# Patient Record
Sex: MALE | Race: WHITE | NOT HISPANIC OR LATINO
[De-identification: names, ages, dates, MRNs, and addresses within clinical notes are randomized per-mention and may not be internally consistent; named-entity substitution may affect disease eponyms.]

---

## 2019-12-10 ENCOUNTER — FORM ENCOUNTER (OUTPATIENT)
Age: 84
End: 2019-12-10

## 2019-12-11 ENCOUNTER — APPOINTMENT (OUTPATIENT)
Dept: CT IMAGING | Facility: HOSPITAL | Age: 84
End: 2019-12-11

## 2019-12-11 PROBLEM — Z00.00 ENCOUNTER FOR PREVENTIVE HEALTH EXAMINATION: Status: ACTIVE | Noted: 2019-12-11

## 2019-12-12 ENCOUNTER — FORM ENCOUNTER (OUTPATIENT)
Age: 84
End: 2019-12-12

## 2019-12-13 ENCOUNTER — OUTPATIENT (OUTPATIENT)
Dept: OUTPATIENT SERVICES | Facility: HOSPITAL | Age: 84
LOS: 1 days | End: 2019-12-13
Payer: MEDICARE

## 2019-12-13 ENCOUNTER — APPOINTMENT (OUTPATIENT)
Dept: CT IMAGING | Facility: HOSPITAL | Age: 84
End: 2019-12-13

## 2019-12-13 DIAGNOSIS — J91.0 MALIGNANT PLEURAL EFFUSION: ICD-10-CM

## 2019-12-13 DIAGNOSIS — Z00.00 ENCOUNTER FOR GENERAL ADULT MEDICAL EXAMINATION WITHOUT ABNORMAL FINDINGS: ICD-10-CM

## 2019-12-13 PROCEDURE — 71250 CT THORAX DX C-: CPT | Mod: 26

## 2019-12-13 PROCEDURE — 71250 CT THORAX DX C-: CPT

## 2019-12-17 ENCOUNTER — APPOINTMENT (OUTPATIENT)
Dept: ORTHOPEDIC SURGERY | Facility: CLINIC | Age: 84
End: 2019-12-17
Payer: MEDICARE

## 2019-12-17 PROCEDURE — 99204 OFFICE O/P NEW MOD 45 MIN: CPT

## 2019-12-17 PROCEDURE — 73502 X-RAY EXAM HIP UNI 2-3 VIEWS: CPT | Mod: LT

## 2019-12-17 NOTE — REASON FOR VISIT
[Initial Visit] : an initial visit for [Artificial Hip Joint] : artificial hip joint [Hip Pain] : hip pain [FreeTextEntry3] :  present for today's visit. [TWNoteComboBox1] : American Sign Language

## 2019-12-17 NOTE — HISTORY OF PRESENT ILLNESS
[de-identified] : This is very nice 89-year-old gentleman experiencing left lateral hip pain, which is severe in intensity.  The pain started approximately 3 weeks ago when he fell down some steps.  He was brought to the hospital and transferred to the rehabilitation facility.  It was felt that he may have a small fracture around his left total hip arthroplasty which is several years old.  He has been walking at the rehabilitation facility weightbearing as tolerated.  He comes in for his first evaluation of his left hip today.  Prior to the fall the left total hip arthroplasty was feeling well.  The pain substantially limits activities of daily living. Walking tolerance is reduced.  He is taking narcotics for the pain which is helping.  He is in a wheelchair today and it is unclear if he has a walker at the rehabilitation facility.  He is working with physical therapy there though.  Walking tolerance is reduced.  Activities of daily living are limited because of the pain.

## 2019-12-17 NOTE — PHYSICAL EXAM
[de-identified] : Patient is well nourished, well-developed, in no acute distress, with appropriate mood and affect. The patient is oriented to time, place, and person. Respirations are even and unlabored. Gait evaluation does reveal a limp. There is no inguinal adenopathy. Examination of the contralateral hip shows normal range of motion, strength, no tenderness, and intact skin. The affected limb is well-perfused and showed 2+ dp/pt pulses, without skin lesions, shows a grossly normal motor and sensory examination. Examination of the hip shows a well-healed surgical scar. Hip motion is full and painless from 0-90 degrees extension to flexion, 20 degrees adduction and 20 degrees abduction, and 15 degrees internal and 30 degrees external rotation. Leg lengths are approximately equal. FADIR is negative and TAM is negative. Stinchfield test is negative. Both hips are stable and muscle strength is normal with good strength with resisted abduction and adduction. Pedal pulses are palpable.  Tender to palpation about the lateral aspect of the hip. [de-identified] : AP pelvis, AP hip, and lateral x-rays of the left hip were ordered and obtained in the office and demonstrate satisfactory position and alignment of the components are present. No signs of loosening are seen.  A nondisplaced fracture of the greater trochanter is noted.

## 2019-12-17 NOTE — DISCUSSION/SUMMARY
[de-identified] : This patient has a nondisplaced greater trochanteric fracture of the left hip which is a periprosthetic fracture on his left total of arthroplasty which appears stable.  He can continue to be weightbearing as tolerated with posterior hip precautions.  Recommended to avoid active abduction at this time for a period of 3 months.  P.o. pain control by the rehabilitation facility.  Follow-up in 2 weeks for another x-ray check.  He does understand that if the implant does loosen or move then this may require revision total hip arthroplasty.

## 2019-12-31 ENCOUNTER — APPOINTMENT (OUTPATIENT)
Dept: ORTHOPEDIC SURGERY | Facility: CLINIC | Age: 84
End: 2019-12-31
Payer: SELF-PAY

## 2020-01-06 ENCOUNTER — APPOINTMENT (OUTPATIENT)
Dept: ORTHOPEDIC SURGERY | Facility: CLINIC | Age: 85
End: 2020-01-06
Payer: SELF-PAY

## 2020-01-06 VITALS
HEIGHT: 67 IN | DIASTOLIC BLOOD PRESSURE: 71 MMHG | BODY MASS INDEX: 21.35 KG/M2 | HEART RATE: 81 BPM | WEIGHT: 136 LBS | SYSTOLIC BLOOD PRESSURE: 145 MMHG

## 2020-01-06 PROCEDURE — 99213 OFFICE O/P EST LOW 20 MIN: CPT

## 2020-01-06 PROCEDURE — 73502 X-RAY EXAM HIP UNI 2-3 VIEWS: CPT | Mod: LT

## 2020-01-06 NOTE — HISTORY OF PRESENT ILLNESS
[de-identified] : This is very nice 89-year-old gentleman experiencing left lateral hip pain, which is severe in intensity.  The pain started approximately 5 weeks ago when he fell down some steps.  He was brought to the hospital and transferred to the rehabilitation facility.  I have previously diagnosed him with nondisplaced greater trochanter fracture of the left hip.  He has been walking at the rehabilitation facility weightbearing as tolerated.  We are treating this conservatively.  Prior to the fall the left total hip arthroplasty was feeling well.  The pain substantially limits activities of daily living. Walking tolerance is reduced.  He is taking narcotics for the pain which is helping.  He is in a wheelchair today and it is unclear if he has a walker at the rehabilitation facility.  He is working with physical therapy and employing posterior hip precautions and abductor precautions.  Walking tolerance is reduced.  Activities of daily living are limited because of the pain.

## 2020-01-06 NOTE — PHYSICAL EXAM
[de-identified] : Patient is well nourished, well-developed, in no acute distress, with appropriate mood and affect. The patient is oriented to time, place, and person. Respirations are even and unlabored. Gait evaluation does reveal a limp. There is no inguinal adenopathy. Examination of the contralateral hip shows normal range of motion, strength, no tenderness, and intact skin. The affected limb is well-perfused and showed 2+ dp/pt pulses, without skin lesions, shows a grossly normal motor and sensory examination. Examination of the hip shows a well-healed surgical scar. Hip motion is full and painless from 0-90 degrees extension to flexion, 20 degrees adduction and 20 degrees abduction, and 15 degrees internal and 30 degrees external rotation. Leg lengths are approximately equal. FADIR is negative and TAM is negative. Stinchfield test is negative. Both hips are stable and muscle strength is normal with good strength with resisted abduction and adduction. Pedal pulses are palpable.  Tender to palpation about the lateral aspect of the hip. [de-identified] : AP pelvis, AP hip, and lateral x-rays of the left hip were ordered and obtained in the office and demonstrate satisfactory position and alignment of the components are present. No signs of loosening are seen.  A nondisplaced fracture of the greater trochanter is noted.

## 2020-01-06 NOTE — DISCUSSION/SUMMARY
[de-identified] : This patient has a nondisplaced greater trochanteric fracture of the left hip which is a periprosthetic fracture on his left total of arthroplasty which appears stable.  He can continue to be weightbearing as tolerated with posterior hip precautions.  Recommended to avoid active abduction at this time for a period of 3 months.  P.o. pain control by the rehabilitation facility.  Follow-up in 6 weeks for another x-ray check.  He does understand that if the implant does loosen or move then this may require revision total hip arthroplasty.

## 2020-01-30 ENCOUNTER — INPATIENT (INPATIENT)
Facility: HOSPITAL | Age: 85
LOS: 5 days | Discharge: HOME | DRG: 369 | End: 2020-02-05
Attending: HOSPITALIST | Admitting: STUDENT IN AN ORGANIZED HEALTH CARE EDUCATION/TRAINING PROGRAM
Payer: MEDICARE

## 2020-01-30 VITALS
TEMPERATURE: 98 F | OXYGEN SATURATION: 99 % | SYSTOLIC BLOOD PRESSURE: 106 MMHG | RESPIRATION RATE: 16 BRPM | DIASTOLIC BLOOD PRESSURE: 86 MMHG | HEART RATE: 96 BPM

## 2020-01-30 LAB
ALBUMIN SERPL ELPH-MCNC: 3.4 G/DL — SIGNIFICANT CHANGE UP (ref 3.3–5)
ALP SERPL-CCNC: 68 U/L — SIGNIFICANT CHANGE UP (ref 40–120)
ALT FLD-CCNC: 10 U/L — SIGNIFICANT CHANGE UP (ref 10–45)
ANION GAP SERPL CALC-SCNC: 13 MMOL/L — SIGNIFICANT CHANGE UP (ref 5–17)
AST SERPL-CCNC: 10 U/L — SIGNIFICANT CHANGE UP (ref 10–40)
BASOPHILS # BLD AUTO: 0.01 K/UL — SIGNIFICANT CHANGE UP (ref 0–0.2)
BASOPHILS NFR BLD AUTO: 0.1 % — SIGNIFICANT CHANGE UP (ref 0–2)
BILIRUB SERPL-MCNC: 0.4 MG/DL — SIGNIFICANT CHANGE UP (ref 0.2–1.2)
BLD GP AB SCN SERPL QL: NEGATIVE — SIGNIFICANT CHANGE UP
BUN SERPL-MCNC: 87 MG/DL — HIGH (ref 7–23)
CALCIUM SERPL-MCNC: 8.5 MG/DL — SIGNIFICANT CHANGE UP (ref 8.4–10.5)
CHLORIDE SERPL-SCNC: 108 MMOL/L — SIGNIFICANT CHANGE UP (ref 96–108)
CO2 SERPL-SCNC: 19 MMOL/L — LOW (ref 22–31)
CREAT SERPL-MCNC: 2.59 MG/DL — HIGH (ref 0.5–1.3)
EOSINOPHIL # BLD AUTO: 0.01 K/UL — SIGNIFICANT CHANGE UP (ref 0–0.5)
EOSINOPHIL NFR BLD AUTO: 0.1 % — SIGNIFICANT CHANGE UP (ref 0–6)
GLUCOSE SERPL-MCNC: 139 MG/DL — HIGH (ref 70–99)
HCT VFR BLD CALC: 20.9 % — CRITICAL LOW (ref 39–50)
HGB BLD-MCNC: 6.5 G/DL — CRITICAL LOW (ref 13–17)
IMM GRANULOCYTES NFR BLD AUTO: 0.6 % — SIGNIFICANT CHANGE UP (ref 0–1.5)
LYMPHOCYTES # BLD AUTO: 0.62 K/UL — LOW (ref 1–3.3)
LYMPHOCYTES # BLD AUTO: 6.2 % — LOW (ref 13–44)
MCHC RBC-ENTMCNC: 31.1 GM/DL — LOW (ref 32–36)
MCHC RBC-ENTMCNC: 31.3 PG — SIGNIFICANT CHANGE UP (ref 27–34)
MCV RBC AUTO: 100.5 FL — HIGH (ref 80–100)
MONOCYTES # BLD AUTO: 0.48 K/UL — SIGNIFICANT CHANGE UP (ref 0–0.9)
MONOCYTES NFR BLD AUTO: 4.8 % — SIGNIFICANT CHANGE UP (ref 2–14)
NEUTROPHILS # BLD AUTO: 8.9 K/UL — HIGH (ref 1.8–7.4)
NEUTROPHILS NFR BLD AUTO: 88.2 % — HIGH (ref 43–77)
NRBC # BLD: 0 /100 WBCS — SIGNIFICANT CHANGE UP (ref 0–0)
PLATELET # BLD AUTO: 191 K/UL — SIGNIFICANT CHANGE UP (ref 150–400)
POTASSIUM SERPL-MCNC: 6.1 MMOL/L — HIGH (ref 3.5–5.3)
POTASSIUM SERPL-SCNC: 6.1 MMOL/L — HIGH (ref 3.5–5.3)
PROT SERPL-MCNC: 5.3 G/DL — LOW (ref 6–8.3)
RBC # BLD: 2.08 M/UL — LOW (ref 4.2–5.8)
RBC # FLD: 14.6 % — HIGH (ref 10.3–14.5)
RH IG SCN BLD-IMP: POSITIVE — SIGNIFICANT CHANGE UP
SODIUM SERPL-SCNC: 140 MMOL/L — SIGNIFICANT CHANGE UP (ref 135–145)
TROPONIN T, HIGH SENSITIVITY RESULT: 57 NG/L — HIGH (ref 0–51)
WBC # BLD: 10.08 K/UL — SIGNIFICANT CHANGE UP (ref 3.8–10.5)
WBC # FLD AUTO: 10.08 K/UL — SIGNIFICANT CHANGE UP (ref 3.8–10.5)

## 2020-01-30 PROCEDURE — 99291 CRITICAL CARE FIRST HOUR: CPT

## 2020-01-30 PROCEDURE — 93010 ELECTROCARDIOGRAM REPORT: CPT

## 2020-01-30 RX ORDER — CALCIUM GLUCONATE 100 MG/ML
2 VIAL (ML) INTRAVENOUS ONCE
Refills: 0 | Status: COMPLETED | OUTPATIENT
Start: 2020-01-30 | End: 2020-01-30

## 2020-01-30 RX ORDER — DEXTROSE 50 % IN WATER 50 %
50 SYRINGE (ML) INTRAVENOUS ONCE
Refills: 0 | Status: COMPLETED | OUTPATIENT
Start: 2020-01-30 | End: 2020-01-30

## 2020-01-30 RX ORDER — PANTOPRAZOLE SODIUM 20 MG/1
40 TABLET, DELAYED RELEASE ORAL
Refills: 0 | Status: DISCONTINUED | OUTPATIENT
Start: 2020-01-30 | End: 2020-01-31

## 2020-01-30 RX ORDER — INSULIN HUMAN 100 [IU]/ML
5 INJECTION, SOLUTION SUBCUTANEOUS ONCE
Refills: 0 | Status: COMPLETED | OUTPATIENT
Start: 2020-01-30 | End: 2020-01-30

## 2020-01-30 RX ORDER — ALBUTEROL 90 UG/1
10 AEROSOL, METERED ORAL ONCE
Refills: 0 | Status: COMPLETED | OUTPATIENT
Start: 2020-01-30 | End: 2020-01-30

## 2020-01-30 RX ADMIN — PANTOPRAZOLE SODIUM 40 MILLIGRAM(S): 20 TABLET, DELAYED RELEASE ORAL at 23:04

## 2020-01-30 NOTE — ED PROVIDER NOTE - OBJECTIVE STATEMENT
89y M, accompanied with son, with PMHx of Anemia (last blood transfusion in 2015, likely due to GI bleed), Gout, BPH, HTN, Hypercholesterolemia, CAD S/P triple cardiac bypass in 1995 and pacemaker (on ASA 81mg QD) presents today for 1 episode of melena and vomiting w/ fatigue and weakness today. Pt's son reports that the vomit contained contents of stomach including oatmeal, but the color of the vomit was purplish. Previously visited [facility] a month ago due to fall in late 11/2019. Denies CP, SOB, cough, or sick contact. Last EGD was done in 2015, but pt's son does not recall the results. Drinks red wine socially.     Rx: Currently on Amlodipine, Losartan, Flomax, Simvastatin, ASA, Allopurinol.

## 2020-01-30 NOTE — ED PROVIDER NOTE - PHYSICAL EXAMINATION
Gen: NAD, AOx3, able to make needs known, non-toxic, conversing via sign language  Head: NCAT  HEENT: EOMI, oral mucosa moist  Lung: CTAB, no respiratory distress, no wheezes/rhonchi/rales B/L  CV: RRR, no murmurs  Abd: soft, NTND, no guarding  MSK: no visible deformities  Neuro: No focal sensory or motor deficits  Skin: Warm, well perfused  Psych: normal affect

## 2020-01-30 NOTE — ED PROVIDER NOTE - CLINICAL SUMMARY MEDICAL DECISION MAKING FREE TEXT BOX
Janay Ford): 89y elderly M with questionable BRBPR vs. melena. Pt has general fatigue, will likely admit for further evaluation of sx. Janay Ford): 89y elderly M with questionable BRBPR vs. melena. Pt has general fatigue, will likely admit for further evaluation of symptoms. Pt hgb <7, consented for blood, to transfuse and admit given pts symptoms and concern for GI bleed given report of melontic stools x1 day w/ bright red blood seen w/ most recent bowel movement w/ clots.

## 2020-01-30 NOTE — ED PROVIDER NOTE - PMH
Anemia    BPH (benign prostatic hyperplasia)    CAD (coronary artery disease)    Gout    Hypercholesterolemia    Hypertension

## 2020-01-30 NOTE — ED PROVIDER NOTE - PROGRESS NOTE DETAILS
Dr. Germain Velazquez, PGY-2: page out to PCP Dr. Blanco Dr. Germain Velazquez, PGY-2: 2nd page to Dr. Blanco

## 2020-01-31 DIAGNOSIS — K92.2 GASTROINTESTINAL HEMORRHAGE, UNSPECIFIED: ICD-10-CM

## 2020-01-31 DIAGNOSIS — Z29.9 ENCOUNTER FOR PROPHYLACTIC MEASURES, UNSPECIFIED: ICD-10-CM

## 2020-01-31 DIAGNOSIS — Z98.890 OTHER SPECIFIED POSTPROCEDURAL STATES: Chronic | ICD-10-CM

## 2020-01-31 DIAGNOSIS — D62 ACUTE POSTHEMORRHAGIC ANEMIA: ICD-10-CM

## 2020-01-31 DIAGNOSIS — N18.9 CHRONIC KIDNEY DISEASE, UNSPECIFIED: ICD-10-CM

## 2020-01-31 DIAGNOSIS — N17.9 ACUTE KIDNEY FAILURE, UNSPECIFIED: ICD-10-CM

## 2020-01-31 DIAGNOSIS — E87.5 HYPERKALEMIA: ICD-10-CM

## 2020-01-31 DIAGNOSIS — I24.8 OTHER FORMS OF ACUTE ISCHEMIC HEART DISEASE: ICD-10-CM

## 2020-01-31 DIAGNOSIS — Z95.0 PRESENCE OF CARDIAC PACEMAKER: Chronic | ICD-10-CM

## 2020-01-31 DIAGNOSIS — Z79.899 OTHER LONG TERM (CURRENT) DRUG THERAPY: ICD-10-CM

## 2020-01-31 DIAGNOSIS — Z02.9 ENCOUNTER FOR ADMINISTRATIVE EXAMINATIONS, UNSPECIFIED: ICD-10-CM

## 2020-01-31 DIAGNOSIS — H91.90 UNSPECIFIED HEARING LOSS, UNSPECIFIED EAR: ICD-10-CM

## 2020-01-31 DIAGNOSIS — I25.10 ATHEROSCLEROTIC HEART DISEASE OF NATIVE CORONARY ARTERY WITHOUT ANGINA PECTORIS: ICD-10-CM

## 2020-01-31 LAB
ANION GAP SERPL CALC-SCNC: 11 MMOL/L — SIGNIFICANT CHANGE UP (ref 5–17)
ANION GAP SERPL CALC-SCNC: 15 MMOL/L — SIGNIFICANT CHANGE UP (ref 5–17)
BASOPHILS # BLD AUTO: 0.01 K/UL — SIGNIFICANT CHANGE UP (ref 0–0.2)
BASOPHILS NFR BLD AUTO: 0.1 % — SIGNIFICANT CHANGE UP (ref 0–2)
BUN SERPL-MCNC: 87 MG/DL — HIGH (ref 7–23)
BUN SERPL-MCNC: 91 MG/DL — HIGH (ref 7–23)
CALCIUM SERPL-MCNC: 8.6 MG/DL — SIGNIFICANT CHANGE UP (ref 8.4–10.5)
CALCIUM SERPL-MCNC: 9 MG/DL — SIGNIFICANT CHANGE UP (ref 8.4–10.5)
CHLORIDE SERPL-SCNC: 106 MMOL/L — SIGNIFICANT CHANGE UP (ref 96–108)
CHLORIDE SERPL-SCNC: 110 MMOL/L — HIGH (ref 96–108)
CO2 SERPL-SCNC: 17 MMOL/L — LOW (ref 22–31)
CO2 SERPL-SCNC: 21 MMOL/L — LOW (ref 22–31)
CREAT SERPL-MCNC: 2.5 MG/DL — HIGH (ref 0.5–1.3)
CREAT SERPL-MCNC: 2.68 MG/DL — HIGH (ref 0.5–1.3)
EOSINOPHIL # BLD AUTO: 0.01 K/UL — SIGNIFICANT CHANGE UP (ref 0–0.5)
EOSINOPHIL NFR BLD AUTO: 0.1 % — SIGNIFICANT CHANGE UP (ref 0–6)
GLUCOSE BLDC GLUCOMTR-MCNC: 101 MG/DL — HIGH (ref 70–99)
GLUCOSE BLDC GLUCOMTR-MCNC: 106 MG/DL — HIGH (ref 70–99)
GLUCOSE SERPL-MCNC: 113 MG/DL — HIGH (ref 70–99)
GLUCOSE SERPL-MCNC: 96 MG/DL — SIGNIFICANT CHANGE UP (ref 70–99)
HCT VFR BLD CALC: 20.4 % — CRITICAL LOW (ref 39–50)
HCT VFR BLD CALC: 21 % — CRITICAL LOW (ref 39–50)
HCT VFR BLD CALC: 23 % — LOW (ref 39–50)
HCT VFR BLD CALC: 23.4 % — LOW (ref 39–50)
HGB BLD-MCNC: 6.3 G/DL — CRITICAL LOW (ref 13–17)
HGB BLD-MCNC: 6.5 G/DL — CRITICAL LOW (ref 13–17)
HGB BLD-MCNC: 7.3 G/DL — LOW (ref 13–17)
HGB BLD-MCNC: 7.4 G/DL — LOW (ref 13–17)
IMM GRANULOCYTES NFR BLD AUTO: 0.7 % — SIGNIFICANT CHANGE UP (ref 0–1.5)
LYMPHOCYTES # BLD AUTO: 1.01 K/UL — SIGNIFICANT CHANGE UP (ref 1–3.3)
LYMPHOCYTES # BLD AUTO: 11.3 % — LOW (ref 13–44)
MAGNESIUM SERPL-MCNC: 1.6 MG/DL — SIGNIFICANT CHANGE UP (ref 1.6–2.6)
MAGNESIUM SERPL-MCNC: 1.7 MG/DL — SIGNIFICANT CHANGE UP (ref 1.6–2.6)
MCHC RBC-ENTMCNC: 30.4 PG — SIGNIFICANT CHANGE UP (ref 27–34)
MCHC RBC-ENTMCNC: 30.7 PG — SIGNIFICANT CHANGE UP (ref 27–34)
MCHC RBC-ENTMCNC: 30.9 GM/DL — LOW (ref 32–36)
MCHC RBC-ENTMCNC: 31 GM/DL — LOW (ref 32–36)
MCHC RBC-ENTMCNC: 31.1 PG — SIGNIFICANT CHANGE UP (ref 27–34)
MCHC RBC-ENTMCNC: 31.2 PG — SIGNIFICANT CHANGE UP (ref 27–34)
MCHC RBC-ENTMCNC: 31.6 GM/DL — LOW (ref 32–36)
MCHC RBC-ENTMCNC: 31.7 GM/DL — LOW (ref 32–36)
MCV RBC AUTO: 97.9 FL — SIGNIFICANT CHANGE UP (ref 80–100)
MCV RBC AUTO: 98.6 FL — SIGNIFICANT CHANGE UP (ref 80–100)
MCV RBC AUTO: 98.7 FL — SIGNIFICANT CHANGE UP (ref 80–100)
MCV RBC AUTO: 99.1 FL — SIGNIFICANT CHANGE UP (ref 80–100)
MONOCYTES # BLD AUTO: 0.61 K/UL — SIGNIFICANT CHANGE UP (ref 0–0.9)
MONOCYTES NFR BLD AUTO: 6.9 % — SIGNIFICANT CHANGE UP (ref 2–14)
NEUTROPHILS # BLD AUTO: 7.2 K/UL — SIGNIFICANT CHANGE UP (ref 1.8–7.4)
NEUTROPHILS NFR BLD AUTO: 80.9 % — HIGH (ref 43–77)
NRBC # BLD: 0 /100 WBCS — SIGNIFICANT CHANGE UP (ref 0–0)
PHOSPHATE SERPL-MCNC: 4.1 MG/DL — SIGNIFICANT CHANGE UP (ref 2.5–4.5)
PLATELET # BLD AUTO: 143 K/UL — LOW (ref 150–400)
PLATELET # BLD AUTO: 154 K/UL — SIGNIFICANT CHANGE UP (ref 150–400)
PLATELET # BLD AUTO: 157 K/UL — SIGNIFICANT CHANGE UP (ref 150–400)
PLATELET # BLD AUTO: 158 K/UL — SIGNIFICANT CHANGE UP (ref 150–400)
POTASSIUM SERPL-MCNC: 4.8 MMOL/L — SIGNIFICANT CHANGE UP (ref 3.5–5.3)
POTASSIUM SERPL-MCNC: 4.9 MMOL/L — SIGNIFICANT CHANGE UP (ref 3.5–5.3)
POTASSIUM SERPL-SCNC: 4.8 MMOL/L — SIGNIFICANT CHANGE UP (ref 3.5–5.3)
POTASSIUM SERPL-SCNC: 4.9 MMOL/L — SIGNIFICANT CHANGE UP (ref 3.5–5.3)
RBC # BLD: 2.07 M/UL — LOW (ref 4.2–5.8)
RBC # BLD: 2.12 M/UL — LOW (ref 4.2–5.8)
RBC # BLD: 2.35 M/UL — LOW (ref 4.2–5.8)
RBC # BLD: 2.37 M/UL — LOW (ref 4.2–5.8)
RBC # FLD: 14.3 % — SIGNIFICANT CHANGE UP (ref 10.3–14.5)
RBC # FLD: 14.4 % — SIGNIFICANT CHANGE UP (ref 10.3–14.5)
RBC # FLD: 14.5 % — SIGNIFICANT CHANGE UP (ref 10.3–14.5)
RBC # FLD: 14.5 % — SIGNIFICANT CHANGE UP (ref 10.3–14.5)
RH IG SCN BLD-IMP: POSITIVE — SIGNIFICANT CHANGE UP
SODIUM SERPL-SCNC: 138 MMOL/L — SIGNIFICANT CHANGE UP (ref 135–145)
SODIUM SERPL-SCNC: 142 MMOL/L — SIGNIFICANT CHANGE UP (ref 135–145)
TROPONIN T, HIGH SENSITIVITY RESULT: 75 NG/L — HIGH (ref 0–51)
WBC # BLD: 7.83 K/UL — SIGNIFICANT CHANGE UP (ref 3.8–10.5)
WBC # BLD: 8.15 K/UL — SIGNIFICANT CHANGE UP (ref 3.8–10.5)
WBC # BLD: 8.61 K/UL — SIGNIFICANT CHANGE UP (ref 3.8–10.5)
WBC # BLD: 8.9 K/UL — SIGNIFICANT CHANGE UP (ref 3.8–10.5)
WBC # FLD AUTO: 7.83 K/UL — SIGNIFICANT CHANGE UP (ref 3.8–10.5)
WBC # FLD AUTO: 8.15 K/UL — SIGNIFICANT CHANGE UP (ref 3.8–10.5)
WBC # FLD AUTO: 8.61 K/UL — SIGNIFICANT CHANGE UP (ref 3.8–10.5)
WBC # FLD AUTO: 8.9 K/UL — SIGNIFICANT CHANGE UP (ref 3.8–10.5)

## 2020-01-31 PROCEDURE — 99223 1ST HOSP IP/OBS HIGH 75: CPT | Mod: GC,25

## 2020-01-31 PROCEDURE — 99223 1ST HOSP IP/OBS HIGH 75: CPT

## 2020-01-31 PROCEDURE — 93286 PERI-PX EVAL PM/LDLS PM IP: CPT | Mod: 26

## 2020-01-31 PROCEDURE — 43255 EGD CONTROL BLEEDING ANY: CPT | Mod: GC

## 2020-01-31 RX ORDER — TAMSULOSIN HYDROCHLORIDE 0.4 MG/1
0.4 CAPSULE ORAL AT BEDTIME
Refills: 0 | Status: DISCONTINUED | OUTPATIENT
Start: 2020-01-31 | End: 2020-02-05

## 2020-01-31 RX ORDER — PANTOPRAZOLE SODIUM 20 MG/1
80 TABLET, DELAYED RELEASE ORAL ONCE
Refills: 0 | Status: COMPLETED | OUTPATIENT
Start: 2020-01-31 | End: 2020-01-31

## 2020-01-31 RX ORDER — ALLOPURINOL 300 MG
0 TABLET ORAL
Qty: 0 | Refills: 0 | DISCHARGE

## 2020-01-31 RX ORDER — PANTOPRAZOLE SODIUM 20 MG/1
40 TABLET, DELAYED RELEASE ORAL
Refills: 0 | Status: DISCONTINUED | OUTPATIENT
Start: 2020-01-31 | End: 2020-02-04

## 2020-01-31 RX ORDER — SODIUM ZIRCONIUM CYCLOSILICATE 10 G/10G
10 POWDER, FOR SUSPENSION ORAL ONCE
Refills: 0 | Status: COMPLETED | OUTPATIENT
Start: 2020-01-31 | End: 2020-01-31

## 2020-01-31 RX ORDER — FAMOTIDINE 10 MG/ML
80 INJECTION INTRAVENOUS ONCE
Refills: 0 | Status: DISCONTINUED | OUTPATIENT
Start: 2020-01-31 | End: 2020-01-31

## 2020-01-31 RX ADMIN — ALBUTEROL 10 MILLIGRAM(S): 90 AEROSOL, METERED ORAL at 00:05

## 2020-01-31 RX ADMIN — Medication 200 GRAM(S): at 00:26

## 2020-01-31 RX ADMIN — PANTOPRAZOLE SODIUM 80 MILLIGRAM(S): 20 TABLET, DELAYED RELEASE ORAL at 04:09

## 2020-01-31 RX ADMIN — TAMSULOSIN HYDROCHLORIDE 0.4 MILLIGRAM(S): 0.4 CAPSULE ORAL at 21:54

## 2020-01-31 RX ADMIN — PANTOPRAZOLE SODIUM 40 MILLIGRAM(S): 20 TABLET, DELAYED RELEASE ORAL at 18:47

## 2020-01-31 RX ADMIN — SODIUM ZIRCONIUM CYCLOSILICATE 10 GRAM(S): 10 POWDER, FOR SUSPENSION ORAL at 01:58

## 2020-01-31 RX ADMIN — INSULIN HUMAN 5 UNIT(S): 100 INJECTION, SOLUTION SUBCUTANEOUS at 00:03

## 2020-01-31 RX ADMIN — Medication 50 MILLILITER(S): at 00:02

## 2020-01-31 NOTE — ED ADULT NURSE NOTE - ED STAT RN HANDOFF DETAILS
Patient admitted to telemetry endorsed to hold SHIRLEY Lozano with 2 unit of PRBC in progress no reaction noted. Patient stable in no acute distress.

## 2020-01-31 NOTE — ED ADULT NURSE REASSESSMENT NOTE - NS ED NURSE REASSESS COMMENT FT1
Received patient from night RN s/p transfusion in no acute distress, awaiting  for patient. No s/s of pain or distress continue to monitor patient.

## 2020-01-31 NOTE — ED ADULT NURSE REASSESSMENT NOTE - NS ED NURSE REASSESS COMMENT FT1
Patient receiving second unit of PRBC  service #[id number] used to explain to patient process and to make RN aware if any SOB or pain. Patient verbalizes understanding V/S WNL continue to monitor patient.

## 2020-01-31 NOTE — H&P ADULT - PROBLEM SELECTOR PLAN 10
Transitions of Care Status:  1.  Name of PCP:  Arlette Keith  2.  PCP Contacted on Admission: [ ] Y    [ x] N  -- no answer.   3.  PCP contacted at Discharge: [ ] Y    [ ] N    [ ] N/A  4.  Post-Discharge Appointment Date and Location:  5.  Summary of Handoff given to PCP:

## 2020-01-31 NOTE — H&P ADULT - NSHPLABSRESULTS_GEN_ALL_CORE
LABS:                        6.5    8.61  )-----------( 157      ( 31 Jan 2020 07:25 )             21.0     01-31    138  |  106  |  87<H>  ----------------------------<  113<H>  4.8   |  17<L>  |  2.50<H>    Ca    8.6      31 Jan 2020 05:15    TPro  5.3<L>  /  Alb  3.4  /  TBili  0.4  /  DBili  x   /  AST  10  /  ALT  10  /  AlkPhos  68  01-30      ADDITIONAL STUDIES PERSONALLY REVIEWED BY ME:  GETACHEWG: ADELA carpenter.    CONSULTANTS:  [facility] LABS:                        6.5    8.61  )-----------( 157      ( 31 Jan 2020 07:25 )             21.0     01-31    138  |  106  |  87<H>  ----------------------------<  113<H>  4.8   |  17<L>  |  2.50<H>    Ca    8.6      31 Jan 2020 05:15    TPro  5.3<L>  /  Alb  3.4  /  TBili  0.4  /  DBili  x   /  AST  10  /  ALT  10  /  AlkPhos  68  01-30      ADDITIONAL STUDIES PERSONALLY REVIEWED BY ME:  EKG: ADELA carpenter.    CONSULTANTS:  GI notified.

## 2020-01-31 NOTE — CONSULT NOTE ADULT - ASSESSMENT
Impression:    #Acute blood loss anemia from upper GI bleed: Admission Hb 6.5 with inappropriate response to 7.5 after 2 units pRBC and ongoing melena. Remains hemodynamically stable. On pantoprazole infusion. Suspect bleeding from gastric or duodenal PUD, enhanced by lowe dose aspirin, given prior history of upper GI bleed from suspected ulcer. Differential also includes small bowel angioectasias vs. erosive gastritis. Bleeding from varices or Dieulafoy's lesion less likely.   #CAD s/p CABG: Holding aspirin   #Stage IV CKD     Recommendations:  - keep NPO  - continue pantoprazole infusion at 8 mg/hr  - plan for upper endoscopy today Impression:    #Acute blood loss anemia from upper GI bleed: Admission Hb 6.5 with inappropriate response to 7.5 after 2 units pRBC and ongoing melena. Remains hemodynamically stable. On pantoprazole infusion. Suspect bleeding from gastric or duodenal PUD, enhanced by lowe dose aspirin, given prior history of upper GI bleed from suspected ulcer. Differential also includes small bowel angioectasias vs. erosive gastritis. Bleeding from varices or Dieulafoy's lesion less likely.   #CAD s/p CABG: Holding aspirin   #Stage IV CKD     Recommendations:  - keep NPO  - continue pantoprazole infusion at 8 mg/hr  - plan for upper endoscopy today      Crissy Day PGY-4  Gastroenterology Fellow  Pager #[phone] or [phone]  Pager #[phone] 5pm-7am on weekdays, and on weekends Impression:    #Acute blood loss anemia from upper GI bleed: Admission Hb 6.5 with inappropriate response to 7.5 after 2 units pRBC and ongoing melena. Remains hemodynamically stable. On pantoprazole infusion. Suspect bleeding from gastric or duodenal PUD, enhanced by lowe dose aspirin, given prior history of upper GI bleed from suspected ulcer. Differential also includes small bowel angioectasias vs. erosive gastritis. Bleeding from varices or Dieulafoy's lesion less likely.   #CAD s/p CABG: Holding aspirin.    #Stage IV CKD     Recommendations:  - keep NPO  - continue pantoprazole infusion at 8 mg/hr  - monitor bowel movements and trend CBC every 8 hours  - maintain 2 peripheral IV lines   - transfuse to maintain Hb > 8.0 given history of CAD with CABG  - plan for upper endoscopy today providing recent interrogation report of PPM can be obtained from outpatient Cardiologist; if not then will request PPM interrogation by [facility] team      Crissy Day PGY-4  Gastroenterology Fellow  Pager #[phone] or [phone]  Pager #[phone] 5pm-7am on weekdays, and on weekends Impression:    #Acute blood loss anemia from upper GI bleed: Admission Hb 6.5 with inappropriate response to 7.5 after 2 units pRBC and ongoing melena. Remains hemodynamically stable. On pantoprazole infusion. Suspect bleeding from gastric or duodenal PUD, enhanced by lowe dose aspirin, given prior history of upper GI bleed from suspected ulcer. Differential also includes small bowel angioectasias vs. erosive gastritis. Bleeding from varices or Dieulafoy's lesion less likely.   #CAD s/p CABG: Holding aspirin.    #Stage IV CKD     Recommendations:  - keep NPO  - continue pantoprazole infusion at 8 mg/hr  - monitor bowel movements and trend CBC every 8 hours  - maintain 2 peripheral IV lines   - transfuse to maintain Hb > 8.0 given history of CAD with CABG  - plan for upper endoscopy today providing PPM interrogation can per performed by [facility] team (office of outpatient Cardiologist Dr. Mark closed today so prior interrogation reports could not be obtained)      Crissy Day PGY-4  Gastroenterology Fellow  Pager #[phone] or [phone]  [facility] Phone #[phone]

## 2020-01-31 NOTE — H&P ADULT - NSHPREVIEWOFSYSTEMS_GEN_ALL_CORE
REVIEW OF SYSTEMS  CONSTITUTIONAL: No fevers. No chills  EYES/ENT: No visual changes. No discharge from eyes. No vertigo. No throat pain. No dysphagia.  NECK: No pain or stiffness or rigidity.  RESPIRATORY: No cough. No wheezing. No hemoptysis. No shortness of breath.  CARDIOVASCULAR: No chest pain. No palpitations.   GASTROINTESTINAL: No abdominal pain. No nausea. No vomiting. No hematemesis. No diarrhea. No constipation. +melena, No hematochezia.  GENITOURINARY: No dysuria. No hesitancy. No frequency. No hematuria.  NEUROLOGICAL: No numbness. No weakness. No change in speech. No fecal or urinary incontinence.   MSK: No joint swelling or erythema. No back pain.  SKIN: No itching or rashes.   PSYCH: Normal affect. Normal mood. No si. No hi.    Review of systems negative except for items noted above.

## 2020-01-31 NOTE — CONSULT NOTE ADULT - ATTENDING COMMENTS
Patient seen and examined. Agree with above. Plan for EGD today for evaluation of upper GI bleeding. Keep NPO, continue protonix infusion, follow-up serial CBC and transfuse as needed.

## 2020-01-31 NOTE — H&P ADULT - ASSESSMENT
89 y/o M PMHx of CABG, CKD stage IV, PPM placement, and GIB (last EGD in 2015) who presents with acute blood loss anemia, hyperkalemia, and LUCY 89 y/o M PMHx of CABG, CKD stage IV, PPM placement, and GIB (last EGD in 2015) who presents with acute blood loss anemia, hyperkalemia, and LUCY.

## 2020-01-31 NOTE — PROCEDURE NOTE - ADDITIONAL PROCEDURE DETAILS
1. Normal PPM function with pacing/sensing thresholds and impedances WNL  2. Battery longevity is 15months  3. Patient is not pacemaker dependent

## 2020-01-31 NOTE — H&P ADULT - PROBLEM SELECTOR PLAN 4
Stage IV  Baseline Cr around 2. Stage IV  Baseline Cr around 2.  Hold arb given osmany. From anemia and decreased renal clearance  Monitor troponin to peak.  No active anginal equivalents noted by patient.

## 2020-01-31 NOTE — CONSULT NOTE ADULT - SUBJECTIVE AND OBJECTIVE BOX
Chief Complaint:  Patient is a 89y old  Male who presents with a chief complaint of Acute blood loss anemia (31 Jan 2020 10:53)      HPI:    Obtain with  Marta.        89 year old man with history of CAD s/p CABG (1995) on aspirin, CKD stage IV, s/p PPM placement, and upper GI bleed (approx. 4-5 years ago) who presents for melena and  coffee-ground emesis. Patient reports bowel movement with bloody, loose stool yesterday. No associated abdominal pain, dizziness, chest pain or SOB. Of note, patient admits to poor memory and is not a good historian. Collateral history obtained from son, Gordon Murrieta, over the phone. He did not witness patient having melena but reports patient appeared lethargic and "out of it" when he arrived home. Patient subsequently ate oatmeal and immediately vomited which was noted to be maroon color. Son reports prior episode of GI bleeding approx. 4-5 years ago, requiring multiple blood transfusions and upper endoscopy at [facility]. He reports possible ulcer as cause of bleeding. Patient is not on anticoagulants but takes low-dose aspirin daily. No NSAID overuse but son admits to giving patient Aleve x1 yesterday. Of note, patient recently completed rehab at [facility] for hip injury, now living with son at home, ambulating with walker on wheels, but lived independent as recent as one month ago.        Allergies:  No Known Allergies      Home Medications:    · 	aspirin 81 mg oral tablet, chewable: Last Dose Taken:  , 1 tab(s) orally once a day  · 	losartan: Last Dose Taken:  , dose(s) orally once a day  · 	Flomax 0.4 mg oral capsule: Last Dose Taken:  , 1 cap(s) orally once a day  · 	allopurinol 100 mg oral tablet: Last Dose Taken:  , dose(s) orally once a day  · 	Norvasc 5 mg oral tablet: 1 tab(s) orally once a day  · 	simvastatin 5 mg oral tablet: dose(s) orally once a day      Hospital Medications:  pantoprazole  Injectable 40 milliGRAM(s) IV Push two times a day  tamsulosin 0.4 milliGRAM(s) Oral at bedtime      PMHX/PSHX:  CKD (chronic kidney disease)  S/P CABG x 3  History of esophagogastroduodenoscopy (EGD)  Pacemaker      Family history:  No pertinent family history in first degree relatives      Social History:     ROS:     General:  No weight loss, fevers, chills, night sweats, fatigue  Eyes:  No vision changes, no yellowing of eyes   ENT:  No throat pain, runny nose  CV:  No chest pain, palpitations  Resp:  No SOB, cough, wheezing  GI:  See HPI  :  No burning with urination, no hematuria   Muscle:  No muscle pain, weakness  Neuro:  No numbness/tingling, memory problems  Psych:  No fatigue, insomnia, mood problems  Heme:  No easy bruisability  Skin:  No rash, itching       PHYSICAL EXAM:     GENERAL:  Appears stated age, well-groomed, well-nourished, no distress  HEENT:  NC/AT,  conjunctivae clear and pink,  no JVD  CHEST:  Full & symmetric excursion, no increased effort, breath sounds clear  HEART:  Regular rhythm, S1, S2, no murmur/rub/S3/S4, no abdominal bruit, no edema  ABDOMEN:  Soft, non-tender, non-distended, normoactive bowel sounds,  no masses ,  EXTREMITIES:  no cyanosis,clubbing or edema  SKIN:  No rash/erythema/ecchymoses/petechiae/wounds/abscess/warm/dry  NEURO:  Alert, oriented      Vital Signs:  Vital Signs Last 24 Hrs  T(C): 36.7 (31 Jan 2020 11:35), Max: 36.8 (30 Jan 2020 21:17)  T(F): 98 (31 Jan 2020 11:35), Max: 98.2 (30 Jan 2020 21:17)  HR: 86 (31 Jan 2020 11:35) (71 - 105)  BP: 124/58 (31 Jan 2020 11:35) (99/54 - 132/58)  BP(mean): --  RR: 17 (31 Jan 2020 11:35) (16 - 19)  SpO2: 99% (31 Jan 2020 11:35) (96% - 100%)  Daily     Daily     LABS:                        7.4    8.15  )-----------( 143      ( 31 Jan 2020 13:41 )             23.4     01-31    138  |  106  |  87<H>  ----------------------------<  113<H>  4.8   |  17<L>  |  2.50<H>    Ca    8.6      31 Jan 2020 05:15  Mg     1.6     01-31    TPro  5.3<L>  /  Alb  3.4  /  TBili  0.4  /  DBili  x   /  AST  10  /  ALT  10  /  AlkPhos  68  01-30    LIVER FUNCTIONS - ( 30 Jan 2020 22:22 )  Alb: 3.4 g/dL / Pro: 5.3 g/dL / ALK PHOS: 68 U/L / ALT: 10 U/L / AST: 10 U/L / GGT: x             Imaging: Chief Complaint:  Patient is a 89y old  Male who presents with a chief complaint of Acute blood loss anemia (31 Jan 2020 10:53)      HPI:    Obtain with  Marta.       89 year old man with history of CAD s/p CABG (1995) on aspirin, CKD stage IV, s/p PPM placement, and upper GI bleed (approx. 4-5 years ago) who presents for melena and  coffee-ground emesis. Patient reports bowel movement with bloody, loose stool yesterday. No associated abdominal pain, dizziness, chest pain or SOB. He denies vomiting, BRBPR or melena previously. He reports having "dark red" bowel movements since his hospitalization. Of note, patient admits to poor memory and is not a good historian.     Collateral history obtained from son, Gordon Murrieta, over the phone. He did not witness patient having melena but reports patient appeared lethargic and "out of it" when he arrived home. Patient subsequently ate oatmeal and immediately vomited which was noted to be maroon color. Son reports prior episode of GI bleeding approx. 4-5 years ago, requiring multiple blood transfusions and upper endoscopy at [facility]. He reports possible ulcer as cause of bleeding. Patient is not on anticoagulants but takes low-dose aspirin daily. No NSAID overuse but son admits to giving patient Aleve x1 yesterday. Of note, patient recently completed rehab at [facility] for hip injury, now living with son at home, ambulating with walker on wheels, but lived independently as recent as one month ago.        Allergies:  No Known Allergies      Home Medications:    · 	aspirin 81 mg oral tablet, chewable: Last Dose Taken:  , 1 tab(s) orally once a day  · 	losartan: Last Dose Taken:  , dose(s) orally once a day  · 	Flomax 0.4 mg oral capsule: Last Dose Taken:  , 1 cap(s) orally once a day  · 	allopurinol 100 mg oral tablet: Last Dose Taken:  , dose(s) orally once a day  · 	Norvasc 5 mg oral tablet: 1 tab(s) orally once a day  · 	simvastatin 5 mg oral tablet: dose(s) orally once a day      Hospital Medications:  pantoprazole  Injectable 40 milliGRAM(s) IV Push two times a day  tamsulosin 0.4 milliGRAM(s) Oral at bedtime      PMHX/PSHX:  CKD (chronic kidney disease)  S/P CABG x 3  History of esophagogastroduodenoscopy (EGD)  Pacemaker      Family history:  No pertinent family history in first degree relatives      Social History: Resides with son, largely independent with ADLs.     ROS:     General:  No weight loss, fevers, chills, night sweats, fatigue  Eyes:  No vision changes, no yellowing of eyes   ENT:  No throat pain, runny nose  CV:  No chest pain, palpitations  Resp:  No SOB, cough, wheezing  GI:  See HPI  :  No burning with urination, no hematuria   Muscle:  No muscle pain, weakness  Neuro:  No numbness/tingling, memory problems  Psych:  No fatigue, insomnia, mood problems  Heme:  No easy bruisability  Skin:  No rash, itching       PHYSICAL EXAM:     GENERAL: Elderly, sleeping but arousable, no distress  HEENT:  Conjunctivae clear and pink, no scleral icterus, deafness   CHEST:  Full & symmetric excursion, no increased effort  HEART:  Regular rhythm & rate  ABDOMEN:  Soft, non-distended +LLQ tenderness   EXTREMITIES:  no LE edema  SKIN:  No rash/erythema/ecchymoses/petechiae/jaundice   NEURO:  Alert, orientedx2 (place, person, less with time)      Vital Signs:  Vital Signs Last 24 Hrs  T(C): 36.7 (31 Jan 2020 11:35), Max: 36.8 (30 Jan 2020 21:17)  T(F): 98 (31 Jan 2020 11:35), Max: 98.2 (30 Jan 2020 21:17)  HR: 86 (31 Jan 2020 11:35) (71 - 105)  BP: 124/58 (31 Jan 2020 11:35) (99/54 - 132/58)  BP(mean): --  RR: 17 (31 Jan 2020 11:35) (16 - 19)  SpO2: 99% (31 Jan 2020 11:35) (96% - 100%)  Daily       LABS:                        7.4    8.15  )-----------( 143      ( 31 Jan 2020 13:41 )             23.4     01-31    138  |  106  |  87<H>  ----------------------------<  113<H>  4.8   |  17<L>  |  2.50<H>    Ca    8.6      31 Jan 2020 05:15  Mg     1.6     01-31    TPro  5.3<L>  /  Alb  3.4  /  TBili  0.4  /  DBili  x   /  AST  10  /  ALT  10  /  AlkPhos  68  01-30    LIVER FUNCTIONS - ( 30 Jan 2020 22:22 )  Alb: 3.4 g/dL / Pro: 5.3 g/dL / ALK PHOS: 68 U/L / ALT: 10 U/L / AST: 10 U/L / GGT: x             Imaging: Chief Complaint:  Patient is a 89y old  Male who presents with a chief complaint of Acute blood loss anemia (31 Jan 2020 10:53)      HPI:    Obtained with  Marta.       89 year old man with history of CAD s/p CABG (1995) on aspirin, CKD stage IV, s/p PPM placement, and upper GI bleed (approx. 4-5 years ago) who presents for melena and  coffee-ground emesis. Patient reports bowel movement with bloody, loose stool yesterday. No associated abdominal pain, dizziness, chest pain or SOB. He denies vomiting, BRBPR or melena previously. He reports having "dark red" bowel movements since his hospitalization. Of note, patient admits to poor memory and is not a good historian.     Collateral history obtained from son, Gordon Murrieta, over the phone. He did not witness patient having melena but reports patient appeared lethargic and "out of it" when he arrived home. Patient subsequently ate oatmeal and immediately vomited which was noted to be maroon color. Son reports prior episode of GI bleeding approx. 4-5 years ago, requiring multiple blood transfusions and upper endoscopy at [facility]. He reports possible ulcer as cause of bleeding. Patient is not on anticoagulants but takes low-dose aspirin daily. No NSAID overuse but son admits to giving patient Aleve x1 yesterday. Of note, patient recently completed rehab at [facility] for hip injury, now living with son at home, ambulating with walker on wheels, but lived independently as recent as one month ago.        Allergies:  No Known Allergies      Home Medications:    · 	aspirin 81 mg oral tablet, chewable: Last Dose Taken:  , 1 tab(s) orally once a day  · 	losartan: Last Dose Taken:  , dose(s) orally once a day  · 	Flomax 0.4 mg oral capsule: Last Dose Taken:  , 1 cap(s) orally once a day  · 	allopurinol 100 mg oral tablet: Last Dose Taken:  , dose(s) orally once a day  · 	Norvasc 5 mg oral tablet: 1 tab(s) orally once a day  · 	simvastatin 5 mg oral tablet: dose(s) orally once a day      Hospital Medications:  pantoprazole  Injectable 40 milliGRAM(s) IV Push two times a day  tamsulosin 0.4 milliGRAM(s) Oral at bedtime      PMHX/PSHX:  CKD (chronic kidney disease)  S/P CABG x 3  History of esophagogastroduodenoscopy (EGD)  Pacemaker      Family history:  No pertinent family history in first degree relatives      Social History: Resides with son, largely independent with ADLs.     ROS:     General:  No weight loss, fevers, chills, night sweats, fatigue  Eyes:  No vision changes, no yellowing of eyes   ENT:  No throat pain, runny nose  CV:  No chest pain, palpitations  Resp:  No SOB, cough, wheezing  GI:  See HPI  :  No burning with urination, no hematuria   Muscle:  No muscle pain, weakness  Neuro:  No numbness/tingling, memory problems  Psych:  No fatigue, insomnia, mood problems  Heme:  No easy bruisability  Skin:  No rash, itching       PHYSICAL EXAM:     GENERAL: Elderly, sleeping but arousable, no distress  HEENT:  Conjunctivae clear and pink, no scleral icterus, deafness   CHEST:  Full & symmetric excursion, no increased effort  HEART:  Regular rhythm & rate  ABDOMEN:  Soft, non-distended +LLQ tenderness   EXTREMITIES:  no LE edema  SKIN:  No rash/erythema/ecchymoses/petechiae/jaundice   NEURO:  Alert, orientedx2 (place, person, less with time)      Vital Signs:  Vital Signs Last 24 Hrs  T(C): 36.7 (31 Jan 2020 11:35), Max: 36.8 (30 Jan 2020 21:17)  T(F): 98 (31 Jan 2020 11:35), Max: 98.2 (30 Jan 2020 21:17)  HR: 86 (31 Jan 2020 11:35) (71 - 105)  BP: 124/58 (31 Jan 2020 11:35) (99/54 - 132/58)  BP(mean): --  RR: 17 (31 Jan 2020 11:35) (16 - 19)  SpO2: 99% (31 Jan 2020 11:35) (96% - 100%)  Daily       LABS:                        7.4    8.15  )-----------( 143      ( 31 Jan 2020 13:41 )             23.4     01-31    138  |  106  |  87<H>  ----------------------------<  113<H>  4.8   |  17<L>  |  2.50<H>    Ca    8.6      31 Jan 2020 05:15  Mg     1.6     01-31    TPro  5.3<L>  /  Alb  3.4  /  TBili  0.4  /  DBili  x   /  AST  10  /  ALT  10  /  AlkPhos  68  01-30    LIVER FUNCTIONS - ( 30 Jan 2020 22:22 )  Alb: 3.4 g/dL / Pro: 5.3 g/dL / ALK PHOS: 68 U/L / ALT: 10 U/L / AST: 10 U/L / GGT: x             Imaging: Chief Complaint:  Patient is a 89y old  Male who presents with a chief complaint of Acute blood loss anemia (31 Jan 2020 10:53)      HPI:    Obtained with  Marta.       89 year old man with history of CAD s/p CABG (1995) on aspirin, CKD stage IV, s/p PPM placement, and upper GI bleed (approx. 4-5 years ago) who presents for melena and  coffee-ground emesis. Patient reports bowel movement with bloody, loose stool yesterday. No associated abdominal pain, dizziness, chest pain or SOB. He denies vomiting, BRBPR or melena previously. He reports having "dark red" bowel movements since his hospitalization. Of note, patient admits to poor memory and is not a good historian.     Collateral history obtained from son, Gordon Murrieta, over the phone. He did not witness patient having melena but reports patient appeared lethargic and "out of it" when he arrived home. Patient subsequently ate oatmeal and immediately vomited which was noted to be maroon color. Son reports prior episode of GI bleeding approx. 4-5 years ago, requiring multiple blood transfusions and upper endoscopy at [facility]. He reports possible ulcer as cause of bleeding. Patient is not on anticoagulants but takes low-dose aspirin daily. No NSAID overuse but son admits to giving patient Aleve x1 yesterday. Of note, patient recently completed rehab at [facility] for hip injury, now living with son at home, ambulating with walker on wheels, but lived independently as recent as one month ago.        Allergies:  No Known Allergies      Home Medications:    · 	aspirin 81 mg oral tablet, chewable: Last Dose Taken:  , 1 tab(s) orally once a day  · 	losartan: Last Dose Taken:  , dose(s) orally once a day  · 	Flomax 0.4 mg oral capsule: Last Dose Taken:  , 1 cap(s) orally once a day  · 	allopurinol 100 mg oral tablet: Last Dose Taken:  , dose(s) orally once a day  · 	Norvasc 5 mg oral tablet: 1 tab(s) orally once a day  · 	simvastatin 5 mg oral tablet: dose(s) orally once a day      Hospital Medications:  pantoprazole  Injectable 40 milliGRAM(s) IV Push two times a day  tamsulosin 0.4 milliGRAM(s) Oral at bedtime      PMHX/PSHX:  CKD (chronic kidney disease)  S/P CABG x 3  History of esophagogastroduodenoscopy (EGD)  Pacemaker      Family history:  No pertinent family history in first degree relatives      Social History: Resides with son, largely independent with ADLs.     ROS:     General:  No weight loss, fevers, chills, night sweats, fatigue  Eyes:  No vision changes, no yellowing of eyes   ENT:  No throat pain, runny nose  CV:  No chest pain, palpitations  Resp:  No SOB, cough, wheezing  GI:  See HPI  :  No burning with urination, no hematuria   Muscle:  No muscle pain, weakness  Neuro:  No numbness/tingling, memory problems  Psych:  No fatigue, insomnia, mood problems  Heme:  No easy bruisability  Skin:  No rash, itching       PHYSICAL EXAM:     GENERAL: Elderly, sleeping but arousable, no distress  HEENT:  Conjunctivae clear and pink, no scleral icterus, deafness   CHEST:  Full & symmetric excursion, no increased effort  HEART:  Regular rhythm & rate  ABDOMEN:  Soft, non-distended +LLQ tenderness  RECTAL: +Melena   EXTREMITIES:  no LE edema  SKIN:  No rash/erythema/ecchymoses/petechiae/jaundice   NEURO:  Alert, orientedx2 (place, person, less with time)      Vital Signs:  Vital Signs Last 24 Hrs  T(C): 36.7 (31 Jan 2020 11:35), Max: 36.8 (30 Jan 2020 21:17)  T(F): 98 (31 Jan 2020 11:35), Max: 98.2 (30 Jan 2020 21:17)  HR: 86 (31 Jan 2020 11:35) (71 - 105)  BP: 124/58 (31 Jan 2020 11:35) (99/54 - 132/58)  BP(mean): --  RR: 17 (31 Jan 2020 11:35) (16 - 19)  SpO2: 99% (31 Jan 2020 11:35) (96% - 100%)  Daily       LABS:                        7.4    8.15  )-----------( 143      ( 31 Jan 2020 13:41 )             23.4     01-31    138  |  106  |  87<H>  ----------------------------<  113<H>  4.8   |  17<L>  |  2.50<H>    Ca    8.6      31 Jan 2020 05:15  Mg     1.6     01-31    TPro  5.3<L>  /  Alb  3.4  /  TBili  0.4  /  DBili  x   /  AST  10  /  ALT  10  /  AlkPhos  68  01-30    LIVER FUNCTIONS - ( 30 Jan 2020 22:22 )  Alb: 3.4 g/dL / Pro: 5.3 g/dL / ALK PHOS: 68 U/L / ALT: 10 U/L / AST: 10 U/L / GGT: x

## 2020-01-31 NOTE — H&P ADULT - PROBLEM SELECTOR PLAN 7
SCDs alone given anemia. SCDs alone given anemia.    #CODE  -FULL CODE Since birth   [facility] Sign Language services pager:  [phone]

## 2020-01-31 NOTE — H&P ADULT - PROBLEM SELECTOR PLAN 5
Hx of CABG.  No active anginal equivalents noted by patient. Hx of CABG.  No active anginal equivalents noted by patient.  Hold asa given gib Stage IV  Baseline Cr around 2.  Hold arb given osmany.

## 2020-01-31 NOTE — H&P ADULT - PROBLEM SELECTOR PLAN 6
Since birth   [facility] Sign Language services pager:  [phone] Hx of CABG.  No active anginal equivalents noted by patient.  Hold asa given gib

## 2020-01-31 NOTE — H&P ADULT - PROBLEM SELECTOR PLAN 1
Suspect from UGIB.  GI consult appreciated.  S/p 1 unit of PRBC. Second unit going.   Monitor cbc. Suspect from UGIB. Melena noted and also with uremia.   GI consult appreciated.  S/p 1 unit of PRBC. Second unit going.   Monitor cbc.  Hold home antihypertensive medications given blood loss.   Will resume flomax to prevent development of obstructive uropathy. Suspect from UGIB. Melena noted and also with uremia. DDX includes but not limited to: PUD, duodenitis, malignancy, diverticulosis, avm.   GI consult appreciated.  S/p 1 unit of PRBC. Second unit going.   Monitor cbc closely.   Hold home antihypertensive medications given blood loss.   Will resume flomax to prevent development of obstructive uropathy.  NPO for now pending GI recs.

## 2020-01-31 NOTE — H&P ADULT - PROBLEM SELECTOR PLAN 9
Transitions of Care Status:  1.  Name of PCP:  Arlette Keith  2.  PCP Contacted on Admission: [ ] Y    [ x] N    3.  PCP contacted at Discharge: [ ] Y    [ ] N    [ ] N/A  4.  Post-Discharge Appointment Date and Location:  5.  Summary of Handoff given to PCP: SCDs alone given anemia.    #CODE  -FULL CODE

## 2020-01-31 NOTE — H&P ADULT - NSHPPHYSICALEXAM_GEN_ALL_CORE
T(C): 36.5 (01-31-20 @ 10:10), Max: 36.8 (01-30-20 @ 21:17)  T(F): 97.7 (01-31-20 @ 10:10), Max: 98.2 (01-30-20 @ 21:17)  HR: 87 (01-31-20 @ 10:10) (71 - 105)  BP: 126/57 (01-31-20 @ 10:10) (99/54 - 132/58)  ABP: --  ABP(mean): --  RR: 18 (01-31-20 @ 10:10) (16 - 19)  SpO2: 100% (01-31-20 @ 10:10) (96% - 100%)    CONSTITUTIONAL: No acute distress.   HEENT:  Conjunctiva clear B/L. Nasal mucosa normal. Dry oral mucosa.    Cardiovascular: RRR with no murmurs. No JVD noted. No lower extremity edema B/L. Extremities are warm and well perfused. Radial pulses 2+ B/L. Dorsalis pedis pulses 2+ B/L. Capillary refill <2s. PPM noted without erythema or tenderness.  Respiratory: Lungs CTAB. No accessory muscle use.   Gastrointestinal:  Soft, nontender. Non-distended. Non-rigid. No CVA tenderness B/L.   MSK:  No joint swelling. No joint erythema B/L. No midline spinal tenderness. Thigh compartments soft b/l.   Neurologic:  Alert and awake. Oriented x3. Moving all extremities. Following commands. Making eye contact. No focal deficits.  Skin:  No rashes noted. No skin erythema noted. No ecchymoses or hematomas noted on back, flanks, abdomen, or extremities.   Psych:  Normal affect. Normal Mood. T(C): 36.5 (01-31-20 @ 10:10), Max: 36.8 (01-30-20 @ 21:17)  T(F): 97.7 (01-31-20 @ 10:10), Max: 98.2 (01-30-20 @ 21:17)  HR: 87 (01-31-20 @ 10:10) (71 - 105)  BP: 126/57 (01-31-20 @ 10:10) (99/54 - 132/58)  ABP: --  ABP(mean): --  RR: 18 (01-31-20 @ 10:10) (16 - 19)  SpO2: 100% (01-31-20 @ 10:10) (96% - 100%)    CONSTITUTIONAL: No acute distress.   HEENT:  Conjunctiva clear B/L. Nasal mucosa normal. Dry oral mucosa.    Cardiovascular: RRR with 2/6 early systolic murmurs. No JVD noted. No lower extremity edema B/L. Extremities are warm and well perfused. Radial pulses 2+ B/L. Dorsalis pedis pulses 2+ B/L. Capillary refill <2s. PPM noted without erythema or tenderness.  Respiratory: Lungs CTAB. No accessory muscle use.   Gastrointestinal:  Soft, nontender. Non-distended. Non-rigid. No CVA tenderness B/L.   MSK:  No joint swelling. No joint erythema B/L. No midline spinal tenderness. Thigh compartments soft b/l.   Neurologic:  Alert and awake. Oriented x3. Moving all extremities. Following commands. Making eye contact. No focal deficits.  Skin:  No rashes noted. No skin erythema noted. No ecchymoses or hematomas noted on back, flanks, abdomen, or extremities.   Psych:  Normal affect. Normal Mood. T(C): 36.5 (01-31-20 @ 10:10), Max: 36.8 (01-30-20 @ 21:17)  T(F): 97.7 (01-31-20 @ 10:10), Max: 98.2 (01-30-20 @ 21:17)  HR: 87 (01-31-20 @ 10:10) (71 - 105)  BP: 126/57 (01-31-20 @ 10:10) (99/54 - 132/58)  ABP: --  ABP(mean): --  RR: 18 (01-31-20 @ 10:10) (16 - 19)  SpO2: 100% (01-31-20 @ 10:10) (96% - 100%)    CONSTITUTIONAL: No acute distress.   HEENT:  Conjunctiva clear B/L. Nasal mucosa normal. Dry oral mucosa.    Cardiovascular: RRR with 2/6 early systolic murmurs. No JVD noted. No lower extremity edema B/L. Extremities are warm and well perfused. Radial pulses 2+ B/L. Dorsalis pedis pulses 2+ B/L. Capillary refill <2s. PPM noted without erythema or tenderness.  Respiratory: Lungs CTAB. No accessory muscle use.   Gastrointestinal:  Soft, nontender. Non-distended. Non-rigid. No CVA tenderness B/L.   MSK:  No joint swelling. No joint erythema B/L. No midline spinal tenderness. Thigh compartments soft b/l.   Neurologic:  Alert and awake. Moving all extremities. Following commands. Making eye contact.    Skin:  No rashes noted. No skin erythema noted. No ecchymoses or hematomas noted on back, flanks, abdomen, or extremities.   Psych:  Normal affect. Normal Mood.

## 2020-01-31 NOTE — H&P ADULT - PROBLEM SELECTOR PLAN 8
Transitions of Care Status:  1.  Name of PCP:  Francis Keith  2.  PCP Contacted on Admission: [ ] Y    [ x] N    3.  PCP contacted at Discharge: [ ] Y    [ ] N    [ ] N/A  4.  Post-Discharge Appointment Date and Location:  5.  Summary of Handoff given to PCP: Transitions of Care Status:  1.  Name of PCP:  Arlette Keith  2.  PCP Contacted on Admission: [ ] Y    [ x] N    3.  PCP contacted at Discharge: [ ] Y    [ ] N    [ ] N/A  4.  Post-Discharge Appointment Date and Location:  5.  Summary of Handoff given to PCP: SCDs alone given anemia.    #CODE  -FULL CODE Medications verbalized obtained from son however he will bring in documentation to clarify.   Medication reconciliation incomplete.

## 2020-01-31 NOTE — H&P ADULT - PROBLEM SELECTOR PLAN 3
LUCY on CKD. Suspect prerenal from acute blood loss anemia as above.   Patient without active urinary complaints.  Repeat bmp after 2 units of prbc.   LUCY on CKD.  Hold arb given lucy.

## 2020-01-31 NOTE — ED ADULT NURSE NOTE - OBJECTIVE STATEMENT
89 y/o male history of anemia, gout, BPH, HTN, hypercholesteremia, CAD, and pacemaker presents to the ED via EMS from home c/o rectal bleeding . Patient states that he had passed bright red blood while having a bowel movement today and had vomited "purple bile". Patient states that he has not had any dizziness or pain. Of note, patient is deaf and communicates with ASL.  Denies fever, chills, n/v, weakness, abd pain, diarrhea/constipation, numbness/tingling, urinary s/s. Patient A&Ox3, in no respiratory distress, no chest pain. Abdomen soft and non distended. Non tender to palpation. Strong peripheral pulses.

## 2020-01-31 NOTE — ED ADULT NURSE NOTE - NSIMPLEMENTINTERV_GEN_ALL_ED
Implemented All Fall with Harm Risk Interventions:  [place] to call system. Call bell, personal items and telephone within reach. Instruct patient to call for assistance. Room bathroom lighting operational. Non-slip footwear when patient is off stretcher. Physically safe environment: no spills, clutter or unnecessary equipment. Stretcher in lowest position, wheels locked, appropriate side rails in place. Provide visual cue, wrist band, yellow gown, etc. Monitor gait and stability. Monitor for mental status changes and reorient to person, place, and time. Review medications for side effects contributing to fall risk. Reinforce activity limits and safety measures with patient and family. Provide visual clues: red socks.

## 2020-01-31 NOTE — H&P ADULT - HISTORY OF PRESENT ILLNESS
Dr. Justino Morales DO  Attending Physician  Division of Hospital Medicine  [facility]  Pager:  [phone]     Marta Souza utilized.    89 y/o M PMHx of CABG, CKD stage IV, PPM placement, and GIB (last EGD in 2015) who presents with dark red stools x 1 day. Dr. Justino Morales DO  Attending Physician  Division of Hospital Medicine  [facility]  Pager:  [phone]     Marta Souza utilized    History obtained from patient and son, Mr. Gordon Castillo.     89 y/o M PMHx of CABG, CKD stage IV, PPM placement, and GIB (last EGD in 2015) who presents with dark red stools x 1 day. Yesterday patient noted dark stool that had hints of red. No bright red stool. No dizziness, palpitations, chest pain, dyspnea, syncope, abdominal pain, or any other source of pain. No other complaints from the patient. There are no remitting or exacerbating factors. He is on aspirin at home. Does not know the result of his prior EGD. Patient open to getting an EGD.

## 2020-02-01 LAB
ALBUMIN SERPL ELPH-MCNC: 3.2 G/DL — LOW (ref 3.3–5)
ALP SERPL-CCNC: 56 U/L — SIGNIFICANT CHANGE UP (ref 40–120)
ALT FLD-CCNC: 11 U/L — SIGNIFICANT CHANGE UP (ref 10–45)
ANION GAP SERPL CALC-SCNC: 13 MMOL/L — SIGNIFICANT CHANGE UP (ref 5–17)
AST SERPL-CCNC: 11 U/L — SIGNIFICANT CHANGE UP (ref 10–40)
BASOPHILS # BLD AUTO: 0.02 K/UL — SIGNIFICANT CHANGE UP (ref 0–0.2)
BASOPHILS NFR BLD AUTO: 0.2 % — SIGNIFICANT CHANGE UP (ref 0–2)
BILIRUB SERPL-MCNC: 1 MG/DL — SIGNIFICANT CHANGE UP (ref 0.2–1.2)
BUN SERPL-MCNC: 77 MG/DL — HIGH (ref 7–23)
CALCIUM SERPL-MCNC: 8.6 MG/DL — SIGNIFICANT CHANGE UP (ref 8.4–10.5)
CHLORIDE SERPL-SCNC: 111 MMOL/L — HIGH (ref 96–108)
CO2 SERPL-SCNC: 19 MMOL/L — LOW (ref 22–31)
CREAT SERPL-MCNC: 2.7 MG/DL — HIGH (ref 0.5–1.3)
EOSINOPHIL # BLD AUTO: 0.01 K/UL — SIGNIFICANT CHANGE UP (ref 0–0.5)
EOSINOPHIL NFR BLD AUTO: 0.1 % — SIGNIFICANT CHANGE UP (ref 0–6)
GLUCOSE BLDC GLUCOMTR-MCNC: 103 MG/DL — HIGH (ref 70–99)
GLUCOSE SERPL-MCNC: 105 MG/DL — HIGH (ref 70–99)
HBA1C BLD-MCNC: 5.6 % — SIGNIFICANT CHANGE UP (ref 4–5.6)
HCT VFR BLD CALC: 22.5 % — LOW (ref 39–50)
HCT VFR BLD CALC: 22.7 % — LOW (ref 39–50)
HCT VFR BLD CALC: 23.5 % — LOW (ref 39–50)
HGB BLD-MCNC: 7.1 G/DL — LOW (ref 13–17)
HGB BLD-MCNC: 7.3 G/DL — LOW (ref 13–17)
HGB BLD-MCNC: 7.5 G/DL — LOW (ref 13–17)
IMM GRANULOCYTES NFR BLD AUTO: 0.7 % — SIGNIFICANT CHANGE UP (ref 0–1.5)
LYMPHOCYTES # BLD AUTO: 0.55 K/UL — LOW (ref 1–3.3)
LYMPHOCYTES # BLD AUTO: 4.3 % — LOW (ref 13–44)
MAGNESIUM SERPL-MCNC: 1.7 MG/DL — SIGNIFICANT CHANGE UP (ref 1.6–2.6)
MCHC RBC-ENTMCNC: 30.3 PG — SIGNIFICANT CHANGE UP (ref 27–34)
MCHC RBC-ENTMCNC: 31 PG — SIGNIFICANT CHANGE UP (ref 27–34)
MCHC RBC-ENTMCNC: 31.1 GM/DL — LOW (ref 32–36)
MCHC RBC-ENTMCNC: 31.6 GM/DL — LOW (ref 32–36)
MCHC RBC-ENTMCNC: 32.1 PG — SIGNIFICANT CHANGE UP (ref 27–34)
MCHC RBC-ENTMCNC: 33 GM/DL — SIGNIFICANT CHANGE UP (ref 32–36)
MCV RBC AUTO: 97 FL — SIGNIFICANT CHANGE UP (ref 80–100)
MCV RBC AUTO: 97.5 FL — SIGNIFICANT CHANGE UP (ref 80–100)
MCV RBC AUTO: 98.3 FL — SIGNIFICANT CHANGE UP (ref 80–100)
MONOCYTES # BLD AUTO: 0.65 K/UL — SIGNIFICANT CHANGE UP (ref 0–0.9)
MONOCYTES NFR BLD AUTO: 5.1 % — SIGNIFICANT CHANGE UP (ref 2–14)
NEUTROPHILS # BLD AUTO: 11.54 K/UL — HIGH (ref 1.8–7.4)
NEUTROPHILS NFR BLD AUTO: 89.6 % — HIGH (ref 43–77)
NRBC # BLD: 0 /100 WBCS — SIGNIFICANT CHANGE UP (ref 0–0)
PHOSPHATE SERPL-MCNC: 3.8 MG/DL — SIGNIFICANT CHANGE UP (ref 2.5–4.5)
PLATELET # BLD AUTO: 142 K/UL — LOW (ref 150–400)
PLATELET # BLD AUTO: 152 K/UL — SIGNIFICANT CHANGE UP (ref 150–400)
PLATELET # BLD AUTO: 155 K/UL — SIGNIFICANT CHANGE UP (ref 150–400)
POTASSIUM SERPL-MCNC: 5.3 MMOL/L — SIGNIFICANT CHANGE UP (ref 3.5–5.3)
POTASSIUM SERPL-SCNC: 5.3 MMOL/L — SIGNIFICANT CHANGE UP (ref 3.5–5.3)
PROT SERPL-MCNC: 4.8 G/DL — LOW (ref 6–8.3)
RBC # BLD: 2.29 M/UL — LOW (ref 4.2–5.8)
RBC # BLD: 2.34 M/UL — LOW (ref 4.2–5.8)
RBC # BLD: 2.41 M/UL — LOW (ref 4.2–5.8)
RBC # FLD: 14.5 % — SIGNIFICANT CHANGE UP (ref 10.3–14.5)
RBC # FLD: 14.6 % — HIGH (ref 10.3–14.5)
RBC # FLD: 14.6 % — HIGH (ref 10.3–14.5)
SODIUM SERPL-SCNC: 143 MMOL/L — SIGNIFICANT CHANGE UP (ref 135–145)
WBC # BLD: 12.41 K/UL — HIGH (ref 3.8–10.5)
WBC # BLD: 12.86 K/UL — HIGH (ref 3.8–10.5)
WBC # BLD: 12.92 K/UL — HIGH (ref 3.8–10.5)
WBC # FLD AUTO: 12.41 K/UL — HIGH (ref 3.8–10.5)
WBC # FLD AUTO: 12.86 K/UL — HIGH (ref 3.8–10.5)
WBC # FLD AUTO: 12.92 K/UL — HIGH (ref 3.8–10.5)

## 2020-02-01 PROCEDURE — 99232 SBSQ HOSP IP/OBS MODERATE 35: CPT | Mod: GC

## 2020-02-01 PROCEDURE — 99233 SBSQ HOSP IP/OBS HIGH 50: CPT

## 2020-02-01 RX ADMIN — PANTOPRAZOLE SODIUM 40 MILLIGRAM(S): 20 TABLET, DELAYED RELEASE ORAL at 18:04

## 2020-02-01 RX ADMIN — PANTOPRAZOLE SODIUM 40 MILLIGRAM(S): 20 TABLET, DELAYED RELEASE ORAL at 05:42

## 2020-02-01 RX ADMIN — TAMSULOSIN HYDROCHLORIDE 0.4 MILLIGRAM(S): 0.4 CAPSULE ORAL at 21:51

## 2020-02-01 NOTE — PROGRESS NOTE ADULT - PROBLEM SELECTOR PLAN 4
From anemia and decreased renal clearance  Monitor troponin, recheck today  No active anginal equivalents noted by patient.

## 2020-02-01 NOTE — PROGRESS NOTE ADULT - ASSESSMENT
Impression:    #Acute blood loss anemia from upper GI bleed: Secondary to Harleen Barron tear vs. duodenal angioectasia vs. duodenal ulcers. o active bleeding seen during EGD. Hb stable 7.0 - 8.0 range post-procedure, on pantoprazole 40 IV BID. Remains hemodynamically stable.   #CAD s/p CABG  #Stage IV CKD     Recommendations:  - continue pantoprazole 40 IV BID  - monitor bowel movements and trend CBC every 12hours  - transfuse to maintain Hb > 8.0 given history of CAD with CABG  - continue clear liquid diet; if Hb stable for > 24 hours, may advance diet       Crissy Day PGY-4  Gastroenterology Fellow  Pager #[phone] or [phone]

## 2020-02-01 NOTE — CHART NOTE - NSCHARTNOTEFT_GEN_A_CORE
Hgb/Hct: 7.1/22.5- 1u prbc ordered  Per GI: transfuse to maintain Hb > 8.0 given history of CAD with CABG  cont with cbc q12h  Attending made aware.

## 2020-02-01 NOTE — PROGRESS NOTE ADULT - PROBLEM SELECTOR PLAN 3
LUCY on CKD stage 4  Suspect prerenal from acute blood loss anemia as above.   Patient without active urinary complaints.  Repeat bmp after 2 units of prbc.   LUCY on CKD.  Hold arb given lucy.  stable

## 2020-02-01 NOTE — PROGRESS NOTE ADULT - PROBLEM SELECTOR PLAN 6
Hx of CABG.  No active anginal equivalents noted by patient.  Hold asa given gib    HTN - hold amlodipine and losartan for now given GIB, BP on lower side and LUCY

## 2020-02-01 NOTE — PROGRESS NOTE ADULT - ASSESSMENT
89 y/o M PMHx of CABG, CKD stage IV, PPM placement, and GIB (last EGD in 2015) a/w acute blood loss anemia due to GIB, hyperkalemia, and LUCY on CKD found with margoth reddy tear, esophagitis/duodenitis and 2 clean based duodenal ulcers

## 2020-02-02 LAB
ANION GAP SERPL CALC-SCNC: 11 MMOL/L — SIGNIFICANT CHANGE UP (ref 5–17)
BUN SERPL-MCNC: 83 MG/DL — HIGH (ref 7–23)
CALCIUM SERPL-MCNC: 8.4 MG/DL — SIGNIFICANT CHANGE UP (ref 8.4–10.5)
CHLORIDE SERPL-SCNC: 113 MMOL/L — HIGH (ref 96–108)
CO2 SERPL-SCNC: 19 MMOL/L — LOW (ref 22–31)
CREAT SERPL-MCNC: 2.63 MG/DL — HIGH (ref 0.5–1.3)
GLUCOSE SERPL-MCNC: 98 MG/DL — SIGNIFICANT CHANGE UP (ref 70–99)
HCT VFR BLD CALC: 24.7 % — LOW (ref 39–50)
HGB BLD-MCNC: 7.8 G/DL — LOW (ref 13–17)
MCHC RBC-ENTMCNC: 30.5 PG — SIGNIFICANT CHANGE UP (ref 27–34)
MCHC RBC-ENTMCNC: 31.6 GM/DL — LOW (ref 32–36)
MCV RBC AUTO: 96.5 FL — SIGNIFICANT CHANGE UP (ref 80–100)
NRBC # BLD: 0 /100 WBCS — SIGNIFICANT CHANGE UP (ref 0–0)
PLATELET # BLD AUTO: 143 K/UL — LOW (ref 150–400)
POTASSIUM SERPL-MCNC: 4.5 MMOL/L — SIGNIFICANT CHANGE UP (ref 3.5–5.3)
POTASSIUM SERPL-SCNC: 4.5 MMOL/L — SIGNIFICANT CHANGE UP (ref 3.5–5.3)
RBC # BLD: 2.56 M/UL — LOW (ref 4.2–5.8)
RBC # FLD: 15.4 % — HIGH (ref 10.3–14.5)
SODIUM SERPL-SCNC: 143 MMOL/L — SIGNIFICANT CHANGE UP (ref 135–145)
WBC # BLD: 8.59 K/UL — SIGNIFICANT CHANGE UP (ref 3.8–10.5)
WBC # FLD AUTO: 8.59 K/UL — SIGNIFICANT CHANGE UP (ref 3.8–10.5)

## 2020-02-02 PROCEDURE — 99233 SBSQ HOSP IP/OBS HIGH 50: CPT

## 2020-02-02 RX ADMIN — PANTOPRAZOLE SODIUM 40 MILLIGRAM(S): 20 TABLET, DELAYED RELEASE ORAL at 05:48

## 2020-02-02 RX ADMIN — PANTOPRAZOLE SODIUM 40 MILLIGRAM(S): 20 TABLET, DELAYED RELEASE ORAL at 17:03

## 2020-02-02 RX ADMIN — TAMSULOSIN HYDROCHLORIDE 0.4 MILLIGRAM(S): 0.4 CAPSULE ORAL at 21:02

## 2020-02-02 NOTE — PHYSICAL THERAPY INITIAL EVALUATION ADULT - ADDITIONAL COMMENTS
Pt reports he has 2 places to stay. Pt has apartment in [place] on first floor with 2 steps to enter. Pt reports upon d/c he will be staying with his son in a private house with 1 flight of stairs to bedroom. Pt was independent with single axis cane prior to admit. Pt also owns RW & w/c. Pt was receiving outpatient PT prior to admit.

## 2020-02-02 NOTE — PROGRESS NOTE ADULT - PROBLEM SELECTOR PLAN 3
LUCY on CKD stage 4  Suspect prerenal from acute blood loss anemia as above.   Patient without active urinary complaints.  LUCY on CKD.  Hold arb given lucy.  Creat trending down

## 2020-02-02 NOTE — PROGRESS NOTE ADULT - ASSESSMENT
89 y/o M PMHx of CABG, CKD stage IV, PPM placement, and GIB (last EGD in 2015) a/w acute blood loss anemia due to GIB, hyperkalemia, and LUCY on CKD found with margoth reddy tear, esophagitis/duodenitis and 2 clean based duodenal ulcers

## 2020-02-02 NOTE — PHYSICAL THERAPY INITIAL EVALUATION ADULT - GAIT DEVIATIONS NOTED, PT EVAL
decreased doris/decreased stride length/decreased weight-shifting ability/increased time in double stance/decreased step length

## 2020-02-02 NOTE — PHYSICAL THERAPY INITIAL EVALUATION ADULT - GENERAL OBSERVATIONS, REHAB EVAL
Pt tolerated 45min PT initial evaluation well. Pt rec'd in bed in NAD, +IVL, diaper,  Myles bedside throughout session.

## 2020-02-03 DIAGNOSIS — R04.2 HEMOPTYSIS: ICD-10-CM

## 2020-02-03 LAB
ANION GAP SERPL CALC-SCNC: 11 MMOL/L — SIGNIFICANT CHANGE UP (ref 5–17)
BUN SERPL-MCNC: 72 MG/DL — HIGH (ref 7–23)
CALCIUM SERPL-MCNC: 8.5 MG/DL — SIGNIFICANT CHANGE UP (ref 8.4–10.5)
CHLORIDE SERPL-SCNC: 109 MMOL/L — HIGH (ref 96–108)
CO2 SERPL-SCNC: 19 MMOL/L — LOW (ref 22–31)
CREAT SERPL-MCNC: 2.57 MG/DL — HIGH (ref 0.5–1.3)
GLUCOSE SERPL-MCNC: 99 MG/DL — SIGNIFICANT CHANGE UP (ref 70–99)
HCT VFR BLD CALC: 24.5 % — LOW (ref 39–50)
HCT VFR BLD CALC: 26.7 % — LOW (ref 39–50)
HGB BLD-MCNC: 7.8 G/DL — LOW (ref 13–17)
HGB BLD-MCNC: 8.3 G/DL — LOW (ref 13–17)
MCHC RBC-ENTMCNC: 30.3 PG — SIGNIFICANT CHANGE UP (ref 27–34)
MCHC RBC-ENTMCNC: 30.6 PG — SIGNIFICANT CHANGE UP (ref 27–34)
MCHC RBC-ENTMCNC: 31.1 GM/DL — LOW (ref 32–36)
MCHC RBC-ENTMCNC: 31.8 GM/DL — LOW (ref 32–36)
MCV RBC AUTO: 96.1 FL — SIGNIFICANT CHANGE UP (ref 80–100)
MCV RBC AUTO: 97.4 FL — SIGNIFICANT CHANGE UP (ref 80–100)
NRBC # BLD: 0 /100 WBCS — SIGNIFICANT CHANGE UP (ref 0–0)
NRBC # BLD: 0 /100 WBCS — SIGNIFICANT CHANGE UP (ref 0–0)
PLATELET # BLD AUTO: 149 K/UL — LOW (ref 150–400)
PLATELET # BLD AUTO: 167 K/UL — SIGNIFICANT CHANGE UP (ref 150–400)
POTASSIUM SERPL-MCNC: 4.2 MMOL/L — SIGNIFICANT CHANGE UP (ref 3.5–5.3)
POTASSIUM SERPL-SCNC: 4.2 MMOL/L — SIGNIFICANT CHANGE UP (ref 3.5–5.3)
RBC # BLD: 2.55 M/UL — LOW (ref 4.2–5.8)
RBC # BLD: 2.74 M/UL — LOW (ref 4.2–5.8)
RBC # FLD: 15.1 % — HIGH (ref 10.3–14.5)
RBC # FLD: 15.2 % — HIGH (ref 10.3–14.5)
SODIUM SERPL-SCNC: 139 MMOL/L — SIGNIFICANT CHANGE UP (ref 135–145)
WBC # BLD: 7.13 K/UL — SIGNIFICANT CHANGE UP (ref 3.8–10.5)
WBC # BLD: 7.73 K/UL — SIGNIFICANT CHANGE UP (ref 3.8–10.5)
WBC # FLD AUTO: 7.13 K/UL — SIGNIFICANT CHANGE UP (ref 3.8–10.5)
WBC # FLD AUTO: 7.73 K/UL — SIGNIFICANT CHANGE UP (ref 3.8–10.5)

## 2020-02-03 PROCEDURE — 99233 SBSQ HOSP IP/OBS HIGH 50: CPT

## 2020-02-03 RX ADMIN — PANTOPRAZOLE SODIUM 40 MILLIGRAM(S): 20 TABLET, DELAYED RELEASE ORAL at 17:29

## 2020-02-03 RX ADMIN — PANTOPRAZOLE SODIUM 40 MILLIGRAM(S): 20 TABLET, DELAYED RELEASE ORAL at 05:21

## 2020-02-03 RX ADMIN — TAMSULOSIN HYDROCHLORIDE 0.4 MILLIGRAM(S): 0.4 CAPSULE ORAL at 21:25

## 2020-02-03 NOTE — PROGRESS NOTE ADULT - PROBLEM SELECTOR PLAN 2
s/p EGD with margoth reddy tear, esophagitis/duodenitis and 2 clean based duodenal ulcers  follow up pathology  c/w IV protonix 40mg bid  S/p 2 units of PRBC and received 3rd unit 2/1  Hold home antihypertensive medications given blood loss.   tolerating regular diet

## 2020-02-03 NOTE — PROGRESS NOTE ADULT - PROBLEM SELECTOR PLAN 8
[facility] Emergency Department    [address] [address]    Phone:  [phone]    Fax:  [phone]                                       Dung Lovett   MRN: [id number]    Department:  [facility] Emergency Department   Date of Visit:  10/7/2017           Patient Information     Date Of Birth          2014        Your diagnoses for this visit were:     Viral exanthem        You were seen by Jaydon Hardin DO.        Discharge Instructions       Monitor for fever-temperature greater than 100.4 Fahrenheit treated with either Tylenol or ibuprofen  Might develop some runny nose and congestion  No need to treat the rash unless  complaining of itching-may use Caladryl or 1% hydrocortisone cream  If rash becomes wildly dispersed or develops small clear vesicles return for recheck-this screening would be to make sure it is not developing into chickenpox    24 Hour Appointment Hotline       To make an appointment at any [facility] clinic, call [phone] ([phone]). If you don't have a family doctor or clinic, we will help you find one. [facility] clinics are conveniently located to serve the needs of you and your family.             Review of your medicines      Notice     You have not been prescribed any medications.            Orders Needing Specimen Collection     None      Pending Results     No orders found from 10/5/2017 to 10/8/2017.            Pending Culture Results     No orders found from 10/5/2017 to 10/8/2017.            Pending Results Instructions     If you had any lab results that were not finalized at the time of your Discharge, you can call the ED Lab Result RN at [phone]. You will be contacted by this team for any positive Lab results or changes in treatment. The nurses are available 7 days a week from 10A to 6:30P.  You can leave a message 24 hours per day and they will return your call.        Test Results From Your Hospital Stay                Thank you for choosing [facility]       Thank you for choosing [facility] for your care. Our goal is always to provide you with excellent care. Hearing back from our patients is one way we can continue to improve our services. Please take a few minutes to complete the written survey that you may receive in the mail after you visit with us. Thank you!        [facility] Information     [employer] lets you send messages to your doctor, view your test results, renew your prescriptions, schedule appointments and more. To sign up, go to [email or web address], contact your [facility] clinic or call [phone] during business hours.            Care EveryWhere ID     This is your Care EveryWhere ID. This could be used by other organizations to access your [facility] medical records  [id number]        Equal Access to Services     KERI SAINZ : Ruperto Cao, lashay oneill, jose angel riley, irvin mcqueen. So [facility] [phone].    ATENCIÓN: Si habla español, tiene a faye disposición servicios gratuitos de asistencia lingüística. Llame al [phone].    We comply with applicable federal civil rights laws and [place] laws. We do not discriminate on the basis of race, color, national origin, age, disability, sex, sexual orientation, or gender identity.            After Visit Summary       This is your record. Keep this with you and show to your community pharmacist(s) and doctor(s) at your next visit.                   Since birth   [facility] Sign Language services pager:  [phone]

## 2020-02-03 NOTE — PROGRESS NOTE ADULT - ASSESSMENT
89 y/o M PMHx of CABG, CKD stage IV, PPM placement, and GIB (last EGD in 2015) a/w acute blood loss anemia due to GIB, hyperkalemia, and LUCY on CKD found with margoth reddy tear, esophagitis/duodenitis and 2 clean based duodenal ulcers, hg stable after 3 units of PRBC, this am with slight blood tinged sputum - hold discharge and observe

## 2020-02-04 ENCOUNTER — TRANSCRIPTION ENCOUNTER (OUTPATIENT)
Age: 85
End: 2020-02-04

## 2020-02-04 LAB
ANION GAP SERPL CALC-SCNC: 11 MMOL/L — SIGNIFICANT CHANGE UP (ref 5–17)
BLD GP AB SCN SERPL QL: NEGATIVE — SIGNIFICANT CHANGE UP
BUN SERPL-MCNC: 62 MG/DL — HIGH (ref 7–23)
CALCIUM SERPL-MCNC: 8.5 MG/DL — SIGNIFICANT CHANGE UP (ref 8.4–10.5)
CHLORIDE SERPL-SCNC: 112 MMOL/L — HIGH (ref 96–108)
CO2 SERPL-SCNC: 19 MMOL/L — LOW (ref 22–31)
CREAT SERPL-MCNC: 2.39 MG/DL — HIGH (ref 0.5–1.3)
GLUCOSE SERPL-MCNC: 98 MG/DL — SIGNIFICANT CHANGE UP (ref 70–99)
HCT VFR BLD CALC: 25.1 % — LOW (ref 39–50)
HCT VFR BLD CALC: 26.9 % — LOW (ref 39–50)
HGB BLD-MCNC: 7.9 G/DL — LOW (ref 13–17)
HGB BLD-MCNC: 8.4 G/DL — LOW (ref 13–17)
MCHC RBC-ENTMCNC: 30.5 PG — SIGNIFICANT CHANGE UP (ref 27–34)
MCHC RBC-ENTMCNC: 30.8 PG — SIGNIFICANT CHANGE UP (ref 27–34)
MCHC RBC-ENTMCNC: 31.2 GM/DL — LOW (ref 32–36)
MCHC RBC-ENTMCNC: 31.5 GM/DL — LOW (ref 32–36)
MCV RBC AUTO: 96.9 FL — SIGNIFICANT CHANGE UP (ref 80–100)
MCV RBC AUTO: 98.5 FL — SIGNIFICANT CHANGE UP (ref 80–100)
NRBC # BLD: 0 /100 WBCS — SIGNIFICANT CHANGE UP (ref 0–0)
NRBC # BLD: 0 /100 WBCS — SIGNIFICANT CHANGE UP (ref 0–0)
PLATELET # BLD AUTO: 162 K/UL — SIGNIFICANT CHANGE UP (ref 150–400)
PLATELET # BLD AUTO: 171 K/UL — SIGNIFICANT CHANGE UP (ref 150–400)
POTASSIUM SERPL-MCNC: 4.4 MMOL/L — SIGNIFICANT CHANGE UP (ref 3.5–5.3)
POTASSIUM SERPL-SCNC: 4.4 MMOL/L — SIGNIFICANT CHANGE UP (ref 3.5–5.3)
RBC # BLD: 2.59 M/UL — LOW (ref 4.2–5.8)
RBC # BLD: 2.73 M/UL — LOW (ref 4.2–5.8)
RBC # FLD: 14.9 % — HIGH (ref 10.3–14.5)
RBC # FLD: 15 % — HIGH (ref 10.3–14.5)
RH IG SCN BLD-IMP: POSITIVE — SIGNIFICANT CHANGE UP
SODIUM SERPL-SCNC: 142 MMOL/L — SIGNIFICANT CHANGE UP (ref 135–145)
WBC # BLD: 6.71 K/UL — SIGNIFICANT CHANGE UP (ref 3.8–10.5)
WBC # BLD: 6.73 K/UL — SIGNIFICANT CHANGE UP (ref 3.8–10.5)
WBC # FLD AUTO: 6.71 K/UL — SIGNIFICANT CHANGE UP (ref 3.8–10.5)
WBC # FLD AUTO: 6.73 K/UL — SIGNIFICANT CHANGE UP (ref 3.8–10.5)

## 2020-02-04 PROCEDURE — 99233 SBSQ HOSP IP/OBS HIGH 50: CPT

## 2020-02-04 RX ORDER — FAMOTIDINE 10 MG/ML
20 INJECTION INTRAVENOUS DAILY
Refills: 0 | Status: DISCONTINUED | OUTPATIENT
Start: 2020-02-04 | End: 2020-02-05

## 2020-02-04 RX ORDER — PANTOPRAZOLE SODIUM 20 MG/1
40 TABLET, DELAYED RELEASE ORAL
Refills: 0 | Status: DISCONTINUED | OUTPATIENT
Start: 2020-02-04 | End: 2020-02-05

## 2020-02-04 RX ADMIN — PANTOPRAZOLE SODIUM 40 MILLIGRAM(S): 20 TABLET, DELAYED RELEASE ORAL at 06:07

## 2020-02-04 RX ADMIN — TAMSULOSIN HYDROCHLORIDE 0.4 MILLIGRAM(S): 0.4 CAPSULE ORAL at 22:03

## 2020-02-04 NOTE — DISCHARGE NOTE PROVIDER - NSDCMRMEDTOKEN_GEN_ALL_CORE_FT
allopurinol 100 mg oral tablet: dose(s) orally once a day  aspirin 81 mg oral tablet, chewable: 1 tab(s) orally once a day  Flomax 0.4 mg oral capsule: 1 cap(s) orally once a day  losartan: dose(s) orally once a day  Norvasc 5 mg oral tablet: 1 tab(s) orally once a day  simvastatin 5 mg oral tablet: dose(s) orally once a day allopurinol 100 mg oral tablet: dose(s) orally once a day  pantoprazole 40 mg oral delayed release tablet: 1 tab(s) orally once a day (before a meal)  simvastatin 5 mg oral tablet: dose(s) orally once a day  tamsulosin 0.4 mg oral capsule: 1 cap(s) orally once a day (at bedtime) allopurinol 100 mg oral tablet: dose(s) orally once a day  atorvastatin 20 mg oral tablet: 1 tab(s) orally once a day   pantoprazole 40 mg oral delayed release tablet: 1 tab(s) orally once a day (before a meal)  tamsulosin 0.4 mg oral capsule: 1 cap(s) orally once a day (at bedtime)

## 2020-02-04 NOTE — DISCHARGE NOTE PROVIDER - NSDCFUADDINST_GEN_ALL_CORE_FT
Follow up with your Primary care doctor Follow up with your Primary care doctor  Discuss with your doctor when to resume Aspirin Losartan and Amlodipine  Follow up with Gastroenterology

## 2020-02-04 NOTE — DISCHARGE NOTE PROVIDER - HOSPITAL COURSE
89 y/o M PMHx of CABG, CKD stage IV, PPM placement, and GIB (last EGD in 2015) a/w acute blood loss anemia due to GIB, hyperkalemia, and LUCY on CKD found with margoth reddy tear, esophagitis/duodenitis and 2 clean based duodenal ulcers, hg stable after 3 units of PRBC, last night with large black BM - likely residual from upper GIB, will hold discharge and observe        Acute blood loss anemia.  2/3 evening - one episode of large tarry stool - likely residual from upper GIB.     s/p EGD with margoth reddy tear, esophagitis/duodenitis and 2 clean based duodenal ulcers    follow up pathology    C/w Protonix     S/p 2 units of PRBC and received 3rd unit 2/1    Hold home antihypertensive medications given blood loss.     tolerating regular diet.             Hyperkalemia.   From LUCY.     5.3 2/1    S/p Lokelma, insulin and dextrose on admission        Acute renal failure.  LUCY on CKD stage 4    Suspect prerenal from acute blood loss anemia as above.     Patient without active urinary complaints.    Baseline Cr around 2. Seems to have stabilize    Hold arb given lucy.        Demand ischemia.  From anemia and decreased renal clearance    No active anginal equivalents noted by patient. 89 y/o M PMHx of CABG, CKD stage IV, PPM placement, and GIB (last EGD in 2015) a/w acute blood loss anemia due to GIB, hyperkalemia, and LUCY on CKD found with margoth reddy tear, esophagitis/duodenitis and 2 clean based duodenal ulcers, hg stable after 3 units of PRBC, last night with large black BM - likely residual from upper GIB, will hold discharge and observe        Acute blood loss anemia.  2/3 evening - one episode of large tarry stool - likely residual from upper GIB.     s/p EGD with margoth reddy tear, esophagitis/duodenitis and 2 clean based duodenal ulcers    follow up pathology    C/w Protonix     S/p 2 units of PRBC and received 3rd unit 2/1    Hold home antihypertensive medications given blood loss.     tolerating regular diet.             Hyperkalemia.   From LUCY.     5.3 2/1    S/p Lokelma, insulin and dextrose on admission        Acute renal failure.  LUCY on CKD stage 4    Suspect prerenal from acute blood loss anemia as above.     Patient without active urinary complaints.    Baseline Cr around 2. Seems to have stabilize    Hold arb given lucy.        Demand ischemia.  From anemia and decreased renal clearance    No active anginal equivalents noted by patient.         Pt to discuss with his doctor when to resume Aspirin. BP meds also held during hospitalization, pt told to discuss with his doctor when to resume Amlodipine and Losartan 89 y/o M PMHx of CABG, CKD stage IV, PPM placement, and GIB (last EGD in 2015) a/w acute blood loss anemia due to GIB, hyperkalemia, and LUCY on CKD found with margoth reddy tear, esophagitis/duodenitis and 2 clean based duodenal ulcers, hg stable after 3 units of PRBC, patient with 2 BM since EGD with dark stool, second improved - likely residual from upper GIB - hg has been stable, for discharge today        Problem/Plan - 1:    ·  Problem: Hemoptysis.  Plan: no further hemoptysis, still with some black stool, expected after upper GIB, hg stable.         Problem/Plan - 2:    ·  Problem: Acute blood loss anemia.  Plan: two episodes of black stools since EGD, hg stable, expected after upper GIB      Hg >8    s/p EGD with margoth reddy tear, esophagitis/duodenitis and 2 clean based duodenal ulcers    c/w PO protonix    S/p 2 units of PRBC and received 3rd unit 2/1    Hold home antihypertensive medications given blood loss.     tolerating regular diet.         Problem/Plan - 3:    ·  Problem: Hyperkalemia.  Plan: From LUCY.    5.3 2/1    S/p Lokelma, insulin and dextrose on admission    Monitor bmp.         Problem/Plan - 4:    ·  Problem: Acute renal failure.  Plan: LUCY on CKD stage 4    Suspect prerenal from acute blood loss anemia as above.     Patient without active urinary complaints.    LUCY on CKD.    Hold arb given lucy.    Creat trending down.         Problem/Plan - 5:    ·  Problem: Demand ischemia.  Plan: From anemia and decreased renal clearance    No active anginal equivalents noted by patient.         Problem/Plan - 6:    Problem: CKD (chronic kidney disease). Plan: Stage IV    Baseline Cr around 2. Seems to have stabilized    Hold arb given lucy.        Problem/Plan - 7:    ·  Problem: CAD (coronary artery disease).  Plan: Hx of CABG.    No active anginal equivalents noted by patient.    Hold asa given gib        HTN - hold amlodipine and losartan for now given GIB, BP on lower side and LUCY.         Problem/Plan - 8:    ·  Problem: Deaf.  Plan: Since birth         Patient to follow up with PCP and GI. Son understands plan. Patient lives with son

## 2020-02-04 NOTE — DISCHARGE NOTE PROVIDER - CARE PROVIDER_API CALL
Sagar Blanco)  Geriatric Medicine; Internal Medicine  [address] [place] [address]  Phone: [phone]  Fax: [phone]  Follow Up Time: Lucía Sarabia)  Gastroenterology; Internal Medicine  [address] [place] [address], [facility] [address]  [place] [address]  Phone: [phone]  Fax: [phone]  Follow Up Time:

## 2020-02-04 NOTE — DISCHARGE NOTE PROVIDER - NSDCFUSCHEDAPPT_GEN_ALL_CORE_FT
EMERITA SANTOS ; 02/18/2020 ; [facility] [address] [facility] EMERITA SANTOS ; 02/18/2020 ; [facility] [address] [facility] EMERITA SANTOS ; 02/18/2020 ; [facility] [address] [facility]

## 2020-02-04 NOTE — DISCHARGE NOTE PROVIDER - NSDCCPCAREPLAN_GEN_ALL_CORE_FT
PRINCIPAL DISCHARGE DIAGNOSIS  Diagnosis: Acute blood loss anemia  Assessment and Plan of Treatment: Had upper endoscopy on 1/31: Showed Harleen Barron tear, duodenal ulcers. No active bleeding seen during procedure. Required blood transfusion during hospitalization. CBC stable      SECONDARY DISCHARGE DIAGNOSES  Diagnosis: Hemoptysis  Assessment and Plan of Treatment: Condition resolved    Diagnosis: Acute renal failure superimposed on stage 4 chronic kidney disease  Assessment and Plan of Treatment: Creatinine stable    Diagnosis: Hyperkalemia  Assessment and Plan of Treatment: Potassium improved    Diagnosis: Demand ischemia  Assessment and Plan of Treatment: Likely from Anemia

## 2020-02-04 NOTE — PROGRESS NOTE ADULT - ASSESSMENT
89 y/o M PMHx of CABG, CKD stage IV, PPM placement, and GIB (last EGD in 2015) a/w acute blood loss anemia due to GIB, hyperkalemia, and LUCY on CKD found with margoth reddy tear, esophagitis/duodenitis and 2 clean based duodenal ulcers, hg stable after 3 units of PRBC, last night with large black BM - likely residual from upper GIB, will hold discharge and observe

## 2020-02-04 NOTE — PROGRESS NOTE ADULT - PROBLEM SELECTOR PLAN 2
2/3 evening - one episode of large tarry stool - likely residual from upper GIB. Will repeat CBC this afternoon at 3pm, transfuse if hg <8  s/p EGD with margoth reddy tear, esophagitis/duodenitis and 2 clean based duodenal ulcers  follow up pathology  change protonix to PO  S/p 2 units of PRBC and received 3rd unit 2/1  Hold home antihypertensive medications given blood loss.   tolerating regular diet

## 2020-02-04 NOTE — DISCHARGE NOTE PROVIDER - CARE PROVIDERS DIRECT ADDRESSES
[email or web address]

## 2020-02-05 ENCOUNTER — TRANSCRIPTION ENCOUNTER (OUTPATIENT)
Age: 85
End: 2020-02-05

## 2020-02-05 VITALS
DIASTOLIC BLOOD PRESSURE: 55 MMHG | SYSTOLIC BLOOD PRESSURE: 106 MMHG | TEMPERATURE: 97 F | OXYGEN SATURATION: 98 % | WEIGHT: 143.96 LBS | HEART RATE: 80 BPM | RESPIRATION RATE: 18 BRPM

## 2020-02-05 DIAGNOSIS — Z95.1 PRESENCE OF AORTOCORONARY BYPASS GRAFT: Chronic | ICD-10-CM

## 2020-02-05 DIAGNOSIS — Z95.0 PRESENCE OF CARDIAC PACEMAKER: Chronic | ICD-10-CM

## 2020-02-05 PROCEDURE — 99239 HOSP IP/OBS DSCHRG MGMT >30: CPT

## 2020-02-05 PROCEDURE — 82947 ASSAY GLUCOSE BLOOD QUANT: CPT

## 2020-02-05 PROCEDURE — 82435 ASSAY OF BLOOD CHLORIDE: CPT

## 2020-02-05 PROCEDURE — 96375 TX/PRO/DX INJ NEW DRUG ADDON: CPT

## 2020-02-05 PROCEDURE — 97162 PT EVAL MOD COMPLEX 30 MIN: CPT

## 2020-02-05 PROCEDURE — 80048 BASIC METABOLIC PNL TOTAL CA: CPT

## 2020-02-05 PROCEDURE — 83735 ASSAY OF MAGNESIUM: CPT

## 2020-02-05 PROCEDURE — 85014 HEMATOCRIT: CPT

## 2020-02-05 PROCEDURE — 84100 ASSAY OF PHOSPHORUS: CPT

## 2020-02-05 PROCEDURE — 86850 RBC ANTIBODY SCREEN: CPT

## 2020-02-05 PROCEDURE — 84295 ASSAY OF SERUM SODIUM: CPT

## 2020-02-05 PROCEDURE — 84132 ASSAY OF SERUM POTASSIUM: CPT

## 2020-02-05 PROCEDURE — 36430 TRANSFUSION BLD/BLD COMPNT: CPT

## 2020-02-05 PROCEDURE — 85027 COMPLETE CBC AUTOMATED: CPT

## 2020-02-05 PROCEDURE — 93005 ELECTROCARDIOGRAM TRACING: CPT

## 2020-02-05 PROCEDURE — C1889: CPT

## 2020-02-05 PROCEDURE — 82962 GLUCOSE BLOOD TEST: CPT

## 2020-02-05 PROCEDURE — 82803 BLOOD GASES ANY COMBINATION: CPT

## 2020-02-05 PROCEDURE — P9016: CPT

## 2020-02-05 PROCEDURE — 99291 CRITICAL CARE FIRST HOUR: CPT | Mod: 25

## 2020-02-05 PROCEDURE — 86923 COMPATIBILITY TEST ELECTRIC: CPT

## 2020-02-05 PROCEDURE — 83605 ASSAY OF LACTIC ACID: CPT

## 2020-02-05 PROCEDURE — 86901 BLOOD TYPING SEROLOGIC RH(D): CPT

## 2020-02-05 PROCEDURE — 96374 THER/PROPH/DIAG INJ IV PUSH: CPT

## 2020-02-05 PROCEDURE — 94640 AIRWAY INHALATION TREATMENT: CPT

## 2020-02-05 PROCEDURE — 86900 BLOOD TYPING SEROLOGIC ABO: CPT

## 2020-02-05 PROCEDURE — 82330 ASSAY OF CALCIUM: CPT

## 2020-02-05 PROCEDURE — 80053 COMPREHEN METABOLIC PANEL: CPT

## 2020-02-05 PROCEDURE — 84484 ASSAY OF TROPONIN QUANT: CPT

## 2020-02-05 PROCEDURE — 83036 HEMOGLOBIN GLYCOSYLATED A1C: CPT

## 2020-02-05 RX ORDER — TAMSULOSIN HYDROCHLORIDE 0.4 MG/1
1 CAPSULE ORAL
Qty: 0 | Refills: 0 | DISCHARGE
Start: 2020-02-05

## 2020-02-05 RX ORDER — AMLODIPINE BESYLATE 2.5 MG/1
1 TABLET ORAL
Qty: 0 | Refills: 0 | DISCHARGE

## 2020-02-05 RX ORDER — PANTOPRAZOLE SODIUM 20 MG/1
1 TABLET, DELAYED RELEASE ORAL
Qty: 14 | Refills: 0
Start: 2020-02-05 | End: 2020-02-18

## 2020-02-05 RX ORDER — ASPIRIN/CALCIUM CARB/MAGNESIUM 324 MG
1 TABLET ORAL
Qty: 0 | Refills: 0 | DISCHARGE

## 2020-02-05 RX ORDER — LOSARTAN POTASSIUM 100 MG/1
0 TABLET, FILM COATED ORAL
Qty: 0 | Refills: 0 | DISCHARGE

## 2020-02-05 RX ORDER — PANTOPRAZOLE SODIUM 20 MG/1
1 TABLET, DELAYED RELEASE ORAL
Qty: 0 | Refills: 0 | DISCHARGE
Start: 2020-02-05

## 2020-02-05 RX ORDER — ATORVASTATIN CALCIUM 80 MG/1
1 TABLET, FILM COATED ORAL
Qty: 30 | Refills: 0
Start: 2020-02-05 | End: 2020-03-05

## 2020-02-05 RX ORDER — TAMSULOSIN HYDROCHLORIDE 0.4 MG/1
1 CAPSULE ORAL
Qty: 0 | Refills: 0 | DISCHARGE

## 2020-02-05 RX ORDER — SIMVASTATIN 20 MG/1
0 TABLET, FILM COATED ORAL
Qty: 0 | Refills: 0 | DISCHARGE

## 2020-02-05 RX ADMIN — PANTOPRAZOLE SODIUM 40 MILLIGRAM(S): 20 TABLET, DELAYED RELEASE ORAL at 05:47

## 2020-02-05 RX ADMIN — FAMOTIDINE 20 MILLIGRAM(S): 10 INJECTION INTRAVENOUS at 12:00

## 2020-02-05 NOTE — PROGRESS NOTE ADULT - PROBLEM SELECTOR PLAN 2
two episodes of black stools since EGD, hg stable, expected after upper GIB    Hg >8  s/p EGD with margoth reddy tear, esophagitis/duodenitis and 2 clean based duodenal ulcers  c/w PO protonix  S/p 2 units of PRBC and received 3rd unit 2/1  Hold home antihypertensive medications given blood loss.   tolerating regular diet

## 2020-02-05 NOTE — PROGRESS NOTE ADULT - REASON FOR ADMISSION
Acute blood loss anemia

## 2020-02-05 NOTE — PROGRESS NOTE ADULT - ATTENDING COMMENTS
I have seen and examined this patient with the GI fellow. Agree with above.
Will discharge off BP meds and ASA - patient to follow up with PCP and GI  Instructed patient via  to call PCP or proceed to ED if black stools continue/increase  Discharge time 40 min

## 2020-02-05 NOTE — PROGRESS NOTE ADULT - ASSESSMENT
89 y/o M PMHx of CABG, CKD stage IV, PPM placement, and GIB (last EGD in 2015) a/w acute blood loss anemia due to GIB, hyperkalemia, and LUCY on CKD found with margoth reddy tear, esophagitis/duodenitis and 2 clean based duodenal ulcers, hg stable after 3 units of PRBC, patient with 2 BM since EGD with dark stool, second improved - likely residual from upper GIB - hg has been stable, for discharge today

## 2020-02-05 NOTE — DISCHARGE NOTE NURSING/CASE MANAGEMENT/SOCIAL WORK - PATIENT PORTAL LINK FT
You can access the FollowMyHealth Patient Portal offered by [facility] by registering at the following website: [email or web address]. By joining [employer] [facility]’s FollowMyHealth portal, you will also be able to view your health information using other applications (apps) compatible with our system.

## 2020-02-05 NOTE — PROGRESS NOTE ADULT - PROBLEM SELECTOR PLAN 1
light blood tinged sputum x1, unclear if new bleeding or due to cough.  Monitor in hospital  d/w GI, monitor closely  repeat cbc at 4pm
no further hemoptysis   2/3 am light blood tinged sputum x1, unclear if new bleeding or due to cough.  d/w GI, monitor closely  repeat cbc at 4pm
no further hemoptysis, still with some black stool, expected after upper GIB, hg stable
s/p EGD with margoth reddy tear, esophagitis/duodenitis and 2 clean based duodenal ulcers  follow up pathology  c/w IV protonix 40mg bid  S/p 2 units of PRBC and received 3rd unit 2/1  No further bleeding, change cbc to daily, unless has further bleeding  Hold home antihypertensive medications given blood loss.   advance to regular diet
s/p EGD with margoth reddy tear, esophagitis/duodenitis and 2 clean based duodenal ulcers  follow up pathology  c/w IV protonix 40mg bid  S/p 2 units of PRBC.   Monitor cbc q12hrs  Hold home antihypertensive medications given blood loss.   advance to full liquid today
Detail Level: Detailed
Comment: Right preauricular 11/13/19.

## 2020-02-05 NOTE — PROGRESS NOTE ADULT - SUBJECTIVE AND OBJECTIVE BOX
Pre-Endoscopy Evaluation      Referring Physician:  dr. khan                             Procedure:  upper gastrointestinal endoscopy     Indication for Procedure: gib    Pertinent History: 89 year old man with history of CAD s/p CABG (1995) on aspirin, CKD stage IV, s/p PPM placement, and upper GI bleed (approx. 4-5 years ago) who presents for melena and  coffee-ground emesis      Sedation by Anesthesia [x]    PAST MEDICAL & SURGICAL HISTORY:  CKD (chronic kidney disease)  S/P CABG x 3  History of esophagogastroduodenoscopy (EGD)  Pacemaker      PMH of Gastroparesis [ ]  Gastric Surgery [ ]  Gastric Outlet Obstruction [ ]    Allergies    No Known Allergies    Intolerances    Latex allergy: [ ] yes [x] no    Medications:MEDICATIONS  (STANDING):  pantoprazole  Injectable 40 milliGRAM(s) IV Push two times a day  tamsulosin 0.4 milliGRAM(s) Oral at bedtime    MEDICATIONS  (PRN):      Smoking: [ ] yes  [x] no    AICD/PPM: [x] yes   [ ] no    Pertinent lab data:                        7.3    7.83  )-----------( 154      ( 31 Jan 2020 15:37 )             23.0     01-31    142  |  110<H>  |  91<H>  ----------------------------<  96  4.9   |  21<L>  |  2.68<H>    Ca    9.0      31 Jan 2020 15:37  Phos  4.1     01-31  Mg     1.7     01-31    TPro  5.3<L>  /  Alb  3.4  /  TBili  0.4  /  DBili  x   /  AST  10  /  ALT  10  /  AlkPhos  68  01-30      Physical Examination:      Daily   Vital Signs Last 24 Hrs  T(C): 36.7 (31 Jan 2020 11:35), Max: 36.8 (30 Jan 2020 21:17)  T(F): 98 (31 Jan 2020 11:35), Max: 98.2 (30 Jan 2020 21:17)  HR: 86 (31 Jan 2020 11:35) (71 - 105)  BP: 124/58 (31 Jan 2020 11:35) (99/54 - 132/58)  BP(mean): --  RR: 17 (31 Jan 2020 11:35) (16 - 19)  SpO2: 99% (31 Jan 2020 11:35) (96% - 100%)        Constitutional: NAD     Neck:  No JVD    Respiratory: CTAB/L    Cardiovascular: S1 and S2    Gastrointestinal: BS+, soft, NT/ND    Extremities: No peripheral edema    Neurological: + deafness    : No Davis    Skin: No rashes    Comments: PPM interrogation by [facility] much appreciated      The patient is a suitable candidate for the planned procedure unless box checked [ ]  No, explain:
Chief Complaint:  Patient is a 89y old  Male who presents with a chief complaint of Acute blood loss anemia (01 Feb 2020 11:00)      Interval Events:     EGD yesterday revealed small Harleen Barron tear in distal esophagus treated with clipping, multiple clean-based duodenal ulcers and single angioectasia in duodenum treated with bipolar cautery.      Allergies:  No Known Allergies      Hospital Medications:  pantoprazole  Injectable 40 milliGRAM(s) IV Push two times a day  tamsulosin 0.4 milliGRAM(s) Oral at bedtime      PMHX/PSHX:  CKD (chronic kidney disease)  S/P CABG x 3  History of esophagogastroduodenoscopy (EGD)  Pacemaker      Family history:  No pertinent family history in first degree relatives      ROS: 10-point systems review negative except as otherwise stated in Interval Events.     PHYSICAL EXAM:     GENERAL: Elderly, sleeping but arousable, no distress  HEENT:  Conjunctivae clear and pink, no scleral icterus, deafness   CHEST:  Full & symmetric excursion, no increased effort  HEART:  Regular rhythm & rate  ABDOMEN:  Soft, non-distended +LLQ tenderness  RECTAL: +Melena   EXTREMITIES:  no LE edema  SKIN:  No rash/erythema/ecchymoses/petechiae/jaundice   NEURO:  Alert, orientedx2 (place, person, less with time)    Vital Signs:  Vital Signs Last 24 Hrs  T(C): 36.7 (01 Feb 2020 08:53), Max: 36.8 (01 Feb 2020 04:43)  T(F): 98 (01 Feb 2020 08:53), Max: 98.2 (01 Feb 2020 04:43)  HR: 93 (01 Feb 2020 08:53) (88 - 93)  BP: 105/53 (01 Feb 2020 08:53) (105/53 - 117/60)  BP(mean): --  RR: 18 (01 Feb 2020 08:53) (18 - 18)  SpO2: 97% (01 Feb 2020 08:53) (97% - 97%)  Daily     Daily     LABS:                        7.3    12.86 )-----------( 155      ( 01 Feb 2020 04:47 )             23.5     02-01    143  |  111<H>  |  77<H>  ----------------------------<  105<H>  5.3   |  19<L>  |  2.70<H>    Ca    8.6      01 Feb 2020 04:47  Phos  3.8     02-01  Mg     1.7     02-01    TPro  4.8<L>  /  Alb  3.2<L>  /  TBili  1.0  /  DBili  x   /  AST  11  /  ALT  11  /  AlkPhos  56  02-01    LIVER FUNCTIONS - ( 01 Feb 2020 04:47 )  Alb: 3.2 g/dL / Pro: 4.8 g/dL / ALK PHOS: 56 U/L / ALT: 11 U/L / AST: 11 U/L / GGT: x             Imaging:
PROGRESS NOTE:   Authoted by Dr. Doretha Barfield MD  Pager [phone]     Patient is a 89y old  Male who presents with a chief complaint of Acute blood loss anemia (03 Feb 2020 13:23)    Sign  used  SUBJECTIVE / OVERNIGHT EVENTS: Patient seen and examined at bedside - patient overall feels well but yesterday evening with black tarry stool, solid, otherwise no complaints, no further episodes of blood in cough    ADDITIONAL REVIEW OF SYSTEMS: as above    MEDICATIONS  (STANDING):  pantoprazole  Injectable 40 milliGRAM(s) IV Push two times a day  tamsulosin 0.4 milliGRAM(s) Oral at bedtime    MEDICATIONS  (PRN):      CAPILLARY BLOOD GLUCOSE        I&O's Summary    03 Feb 2020 07:01  -  04 Feb 2020 07:00  --------------------------------------------------------  IN: 970 mL / OUT: 1000 mL / NET: -30 mL        PHYSICAL EXAM:  Vital Signs Last 24 Hrs  T(C): 36.5 (04 Feb 2020 06:06), Max: 36.8 (03 Feb 2020 16:30)  T(F): 97.7 (04 Feb 2020 06:06), Max: 98.2 (03 Feb 2020 16:30)  HR: 78 (04 Feb 2020 06:06) (70 - 79)  BP: 104/57 (04 Feb 2020 06:06) (104/57 - 130/69)  BP(mean): --  RR: 18 (04 Feb 2020 06:06) (18 - 18)  SpO2: 97% (04 Feb 2020 06:06) (97% - 98%)    CONSTITUTIONAL: NAD  RESPIRATORY: Normal respiratory effort; lungs are clear to auscultation bilaterally  CARDIOVASCULAR: Regular rate and rhythm, normal S1 and S2, no murmur/rub/gallop; No lower extremity edema; Peripheral pulses are 2+ bilaterally  ABDOMEN: Nontender to palpation, normoactive bowel sounds, no rebound/guarding; No hepatosplenomegaly  MUSCLOSKELETAL: no clubbing or cyanosis of digits; no joint swelling or tenderness to palpation  PSYCH: A+O to person, place, and time; affect appropriate    LABS:                        7.9    6.73  )-----------( 162      ( 04 Feb 2020 06:43 )             25.1     02-04    142  |  112<H>  |  62<H>  ----------------------------<  98  4.4   |  19<L>  |  2.39<H>    Ca    8.5      04 Feb 2020 06:41                  RADIOLOGY & ADDITIONAL TESTS:  Results Reviewed:   Imaging Personally Reviewed:  Electrocardiogram Personally Reviewed:    COORDINATION OF CARE:  Care Discussed with Consultants/Other Providers [Y/N]:  Prior or Outpatient Records Reviewed [Y/N]:
PROGRESS NOTE:   Authoted by Dr. Doretha Barfield MD  Pager [phone]     Patient is a 89y old  Male who presents with a chief complaint of Acute blood loss anemia (01 Feb 2020 11:38)      SUBJECTIVE / OVERNIGHT EVENTS: patient seen and examined at bedside - patient doing well, sitting in chair, seen with sign , no further bleeding but also no BM, walked with PT, felt good, denies dizziness/sob/chest pain. Says he lives with son. Hoping to go home tomorrow    ADDITIONAL REVIEW OF SYSTEMS: as above    MEDICATIONS  (STANDING):  pantoprazole  Injectable 40 milliGRAM(s) IV Push two times a day  tamsulosin 0.4 milliGRAM(s) Oral at bedtime    MEDICATIONS  (PRN):      CAPILLARY BLOOD GLUCOSE        I&O's Summary    01 Feb 2020 07:01  -  02 Feb 2020 07:00  --------------------------------------------------------  IN: 1080 mL / OUT: 675 mL / NET: 405 mL    02 Feb 2020 07:01  -  02 Feb 2020 11:31  --------------------------------------------------------  IN: 250 mL / OUT: 250 mL / NET: 0 mL        PHYSICAL EXAM:  Vital Signs Last 24 Hrs  T(C): 36.4 (02 Feb 2020 09:36), Max: 36.8 (01 Feb 2020 20:25)  T(F): 97.5 (02 Feb 2020 09:36), Max: 98.2 (01 Feb 2020 20:25)  HR: 86 (02 Feb 2020 10:45) (75 - 99)  BP: 117/62 (02 Feb 2020 10:45) (106/- - 117/62)  BP(mean): --  RR: 18 (02 Feb 2020 09:36) (18 - 188)  SpO2: 94% (02 Feb 2020 09:36) (94% - 98%)    CONSTITUTIONAL: NAD  RESPIRATORY: Normal respiratory effort; lungs are clear to auscultation bilaterally  CARDIOVASCULAR: Regular rate and rhythm, normal S1 and S2, no murmur/rub/gallop; No lower extremity edema; Peripheral pulses are 2+ bilaterally  ABDOMEN: Nontender to palpation, normoactive bowel sounds, no rebound/guarding; No hepatosplenomegaly  MUSCLOSKELETAL: no clubbing or cyanosis of digits; no joint swelling or tenderness to palpation  PSYCH: A+O to person, place, and time; affect appropriate    LABS:                        7.8    8.59  )-----------( 143      ( 02 Feb 2020 07:09 )             24.7     02-02    143  |  113<H>  |  83<H>  ----------------------------<  98  4.5   |  19<L>  |  2.63<H>    Ca    8.4      02 Feb 2020 07:09  Phos  3.8     02-01  Mg     1.7     02-01    TPro  4.8<L>  /  Alb  3.2<L>  /  TBili  1.0  /  DBili  x   /  AST  11  /  ALT  11  /  AlkPhos  56  02-01                RADIOLOGY & ADDITIONAL TESTS:  Results Reviewed:   Imaging Personally Reviewed:  Electrocardiogram Personally Reviewed:    COORDINATION OF CARE:  Care Discussed with Consultants/Other Providers [Y/N]:  Prior or Outpatient Records Reviewed [Y/N]:
PROGRESS NOTE:   Authoted by Dr. Doretha Barfield MD  Pager [phone]     Patient is a 89y old  Male who presents with a chief complaint of Acute blood loss anemia (31 Jan 2020 16:36)      SUBJECTIVE / OVERNIGHT EVENTS: Patient seen and examined at bedside -  sign language present and interpreting. Patient feels much better, no longer having blood per rectum, no bowel movements since yesterday. Denied dizziness, chest pain, sob. He wants to try to advance his diet    ADDITIONAL REVIEW OF SYSTEMS: as above    MEDICATIONS  (STANDING):  pantoprazole  Injectable 40 milliGRAM(s) IV Push two times a day  tamsulosin 0.4 milliGRAM(s) Oral at bedtime    MEDICATIONS  (PRN):      CAPILLARY BLOOD GLUCOSE      POCT Blood Glucose.: 103 mg/dL (01 Feb 2020 05:53)  POCT Blood Glucose.: 101 mg/dL (31 Jan 2020 23:55)    I&O's Summary    31 Jan 2020 07:01  -  01 Feb 2020 07:00  --------------------------------------------------------  IN: 200 mL / OUT: 750 mL / NET: -550 mL    01 Feb 2020 07:01  -  01 Feb 2020 11:00  --------------------------------------------------------  IN: 150 mL / OUT: 0 mL / NET: 150 mL        PHYSICAL EXAM:  Vital Signs Last 24 Hrs  T(C): 36.7 (01 Feb 2020 08:53), Max: 36.8 (01 Feb 2020 04:43)  T(F): 98 (01 Feb 2020 08:53), Max: 98.2 (01 Feb 2020 04:43)  HR: 93 (01 Feb 2020 08:53) (86 - 93)  BP: 105/53 (01 Feb 2020 08:53) (105/53 - 124/58)  BP(mean): --  RR: 18 (01 Feb 2020 08:53) (17 - 18)  SpO2: 97% (01 Feb 2020 08:53) (97% - 99%)    CONSTITUTIONAL: NAD, well-developed  RESPIRATORY: Normal respiratory effort; lungs are clear to auscultation bilaterally  CARDIOVASCULAR: Regular rate and rhythm, normal S1 and S2, no murmur/rub/gallop; No lower extremity edema; Peripheral pulses are 2+ bilaterally  ABDOMEN: Nontender to palpation, normoactive bowel sounds, no rebound/guarding; No hepatosplenomegaly  MUSCLOSKELETAL: no clubbing or cyanosis of digits; no joint swelling or tenderness to palpation  PSYCH: A+O to person, place, and time; affect appropriate    LABS:                        7.3    12.86 )-----------( 155      ( 01 Feb 2020 04:47 )             23.5     02-01    143  |  111<H>  |  77<H>  ----------------------------<  105<H>  5.3   |  19<L>  |  2.70<H>    Ca    8.6      01 Feb 2020 04:47  Phos  3.8     02-01  Mg     1.7     02-01    TPro  4.8<L>  /  Alb  3.2<L>  /  TBili  1.0  /  DBili  x   /  AST  11  /  ALT  11  /  AlkPhos  56  02-01                RADIOLOGY & ADDITIONAL TESTS:  Results Reviewed:   Imaging Personally Reviewed:  Electrocardiogram Personally Reviewed:    COORDINATION OF CARE:  Care Discussed with Consultants/Other Providers [Y/N]:  Prior or Outpatient Records Reviewed [Y/N]:
PROGRESS NOTE:   Authoted by Dr. Doretha Barfield MD  Pager [phone]     Patient is a 89y old  Male who presents with a chief complaint of Acute blood loss anemia (04 Feb 2020 13:44)      SUBJECTIVE / OVERNIGHT EVENTS: Patient seen and examined at bedside - sign  at bedside - patient says he feels well, no new complaints, had 1 BM with dark stool, better than 1st time.     ADDITIONAL REVIEW OF SYSTEMS: as above    MEDICATIONS  (STANDING):  famotidine    Tablet 20 milliGRAM(s) Oral daily  pantoprazole    Tablet 40 milliGRAM(s) Oral before breakfast  tamsulosin 0.4 milliGRAM(s) Oral at bedtime    MEDICATIONS  (PRN):      CAPILLARY BLOOD GLUCOSE        I&O's Summary    04 Feb 2020 07:01  -  05 Feb 2020 07:00  --------------------------------------------------------  IN: 940 mL / OUT: 1650 mL / NET: -710 mL    05 Feb 2020 07:01  -  05 Feb 2020 12:00  --------------------------------------------------------  IN: 240 mL / OUT: 401 mL / NET: -161 mL        PHYSICAL EXAM:  Vital Signs Last 24 Hrs  T(C): 36.3 (05 Feb 2020 10:58), Max: 36.8 (04 Feb 2020 16:09)  T(F): 97.3 (05 Feb 2020 10:58), Max: 98.3 (04 Feb 2020 16:09)  HR: 80 (05 Feb 2020 10:58) (75 - 81)  BP: 106/55 (05 Feb 2020 10:58) (106/55 - 132/71)  BP(mean): --  RR: 18 (05 Feb 2020 10:58) (18 - 18)  SpO2: 98% (05 Feb 2020 10:58) (95% - 98%)    CONSTITUTIONAL: NAD  RESPIRATORY: Normal respiratory effort; lungs are clear to auscultation bilaterally  CARDIOVASCULAR: Regular rate and rhythm, normal S1 and S2, no murmur/rub/gallop; No lower extremity edema; Peripheral pulses are 2+ bilaterally  ABDOMEN: Nontender to palpation, normoactive bowel sounds, no rebound/guarding; No hepatosplenomegaly  MUSCLOSKELETAL: no clubbing or cyanosis of digits; no joint swelling or tenderness to palpation  PSYCH: A+O to person, place, and time; affect appropriate    LABS:                        8.4    6.71  )-----------( 171      ( 04 Feb 2020 15:14 )             26.9     02-04    142  |  112<H>  |  62<H>  ----------------------------<  98  4.4   |  19<L>  |  2.39<H>    Ca    8.5      04 Feb 2020 06:41                  RADIOLOGY & ADDITIONAL TESTS:  Results Reviewed:   Imaging Personally Reviewed:  Electrocardiogram Personally Reviewed:    COORDINATION OF CARE:  Care Discussed with Consultants/Other Providers [Y/N]:  Prior or Outpatient Records Reviewed [Y/N]:
PROGRESS NOTE:   Authoted by Dr. Doretha Barfield MD  Pager [phone]     Patient is a 89y old  Male who presents with a chief complaint of Acute blood loss anemia (02 Feb 2020 11:30)    used   SUBJECTIVE / OVERNIGHT EVENTS: Patient seen and examined at bedside - patient was overall doing well, still no BM, this morning with very light pink tinged sputum with cough. 1 episode, happened with cough. Otherwise no bleeding per rectum.     ADDITIONAL REVIEW OF SYSTEMS: as above    MEDICATIONS  (STANDING):  pantoprazole  Injectable 40 milliGRAM(s) IV Push two times a day  tamsulosin 0.4 milliGRAM(s) Oral at bedtime    MEDICATIONS  (PRN):      CAPILLARY BLOOD GLUCOSE        I&O's Summary    02 Feb 2020 07:01  -  03 Feb 2020 07:00  --------------------------------------------------------  IN: 990 mL / OUT: 1200 mL / NET: -210 mL    03 Feb 2020 07:01  -  03 Feb 2020 13:24  --------------------------------------------------------  IN: 240 mL / OUT: 400 mL / NET: -160 mL        PHYSICAL EXAM:  Vital Signs Last 24 Hrs  T(C): 36.4 (03 Feb 2020 09:18), Max: 36.9 (02 Feb 2020 20:16)  T(F): 97.6 (03 Feb 2020 09:18), Max: 98.4 (02 Feb 2020 20:16)  HR: 73 (03 Feb 2020 09:18) (73 - 78)  BP: 107/60 (03 Feb 2020 09:18) (104/58 - 118/60)  BP(mean): --  RR: 18 (03 Feb 2020 09:18) (17 - 18)  SpO2: 95% (03 Feb 2020 09:18) (95% - 98%)    CONSTITUTIONAL: NAD, well-developed  RESPIRATORY: Normal respiratory effort; lungs are clear to auscultation bilaterally  CARDIOVASCULAR: Regular rate and rhythm, normal S1 and S2, no murmur/rub/gallop; No lower extremity edema; Peripheral pulses are 2+ bilaterally  ABDOMEN: Nontender to palpation, normoactive bowel sounds, no rebound/guarding; No hepatosplenomegaly  MUSCLOSKELETAL: no clubbing or cyanosis of digits; no joint swelling or tenderness to palpation  PSYCH: A+O to person, place, and time; affect appropriate    LABS:                        7.8    7.13  )-----------( 149      ( 03 Feb 2020 07:07 )             24.5     02-03    139  |  109<H>  |  72<H>  ----------------------------<  99  4.2   |  19<L>  |  2.57<H>    Ca    8.5      03 Feb 2020 07:03                  RADIOLOGY & ADDITIONAL TESTS:  Results Reviewed:   Imaging Personally Reviewed:  Electrocardiogram Personally Reviewed:    COORDINATION OF CARE:  Care Discussed with Consultants/Other Providers [Y/N]:  Prior or Outpatient Records Reviewed [Y/N]: d/w GI - observe for now

## 2020-02-14 PROBLEM — I10 ESSENTIAL (PRIMARY) HYPERTENSION: Chronic | Status: ACTIVE | Noted: 2020-02-05

## 2020-02-14 PROBLEM — N40.0 BENIGN PROSTATIC HYPERPLASIA WITHOUT LOWER URINARY TRACT SYMPTOMS: Chronic | Status: ACTIVE | Noted: 2020-02-05

## 2020-02-14 PROBLEM — Z95.1 PRESENCE OF AORTOCORONARY BYPASS GRAFT: Chronic | Status: ACTIVE | Noted: 2020-01-31

## 2020-02-14 PROBLEM — E78.00 PURE HYPERCHOLESTEROLEMIA, UNSPECIFIED: Chronic | Status: ACTIVE | Noted: 2020-02-05

## 2020-02-14 PROBLEM — M10.9 GOUT, UNSPECIFIED: Chronic | Status: ACTIVE | Noted: 2020-02-05

## 2020-02-14 PROBLEM — D64.9 ANEMIA, UNSPECIFIED: Chronic | Status: ACTIVE | Noted: 2020-02-05

## 2020-02-14 PROBLEM — I25.10 ATHEROSCLEROTIC HEART DISEASE OF NATIVE CORONARY ARTERY WITHOUT ANGINA PECTORIS: Chronic | Status: ACTIVE | Noted: 2020-02-05

## 2020-02-14 PROBLEM — N18.9 CHRONIC KIDNEY DISEASE, UNSPECIFIED: Chronic | Status: ACTIVE | Noted: 2020-01-31

## 2020-02-18 ENCOUNTER — APPOINTMENT (OUTPATIENT)
Dept: GASTROENTEROLOGY | Facility: CLINIC | Age: 85
End: 2020-02-18
Payer: COMMERCIAL

## 2020-02-18 ENCOUNTER — APPOINTMENT (OUTPATIENT)
Dept: ORTHOPEDIC SURGERY | Facility: CLINIC | Age: 85
End: 2020-02-18
Payer: COMMERCIAL

## 2020-02-18 VITALS
BODY MASS INDEX: 21.97 KG/M2 | TEMPERATURE: 97.7 F | HEIGHT: 67 IN | RESPIRATION RATE: 18 BRPM | DIASTOLIC BLOOD PRESSURE: 67 MMHG | SYSTOLIC BLOOD PRESSURE: 148 MMHG | WEIGHT: 140 LBS | HEART RATE: 67 BPM | OXYGEN SATURATION: 97 %

## 2020-02-18 DIAGNOSIS — K44.9 DIAPHRAGMATIC HERNIA W/OUT OBSTRUCTION OR GANGRENE: ICD-10-CM
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
## 2020-02-18 DIAGNOSIS — N18.4 CHRONIC KIDNEY DISEASE, STAGE 4 (SEVERE): ICD-10-CM

## 2020-02-18 DIAGNOSIS — Z78.9 OTHER SPECIFIED HEALTH STATUS: ICD-10-CM

## 2020-02-18 DIAGNOSIS — I25.10 ATHEROSCLEROTIC HEART DISEASE OF NATIVE CORONARY ARTERY W/OUT ANGINA PECTORIS: ICD-10-CM

## 2020-02-18 DIAGNOSIS — M97.02XA PERIPROSTHETIC FRACTURE AROUND INTERNAL PROSTHETIC LEFT HIP JOINT, INITIAL ENCOUNTER: ICD-10-CM

## 2020-02-18 DIAGNOSIS — M79.644 PAIN IN RIGHT FINGER(S): ICD-10-CM

## 2020-02-18 DIAGNOSIS — K22.6 GASTRO-ESOPHAGEAL LACERATION-HEMORRHAGE SYNDROME: ICD-10-CM

## 2020-02-18 DIAGNOSIS — K31.82 DIEULAFOY LESION (HEMORRHAGIC) OF STOMACH AND DUODENUM: ICD-10-CM

## 2020-02-18 PROCEDURE — 73130 X-RAY EXAM OF HAND: CPT | Mod: RT

## 2020-02-18 PROCEDURE — 99203 OFFICE O/P NEW LOW 30 MIN: CPT

## 2020-02-18 PROCEDURE — 73502 X-RAY EXAM HIP UNI 2-3 VIEWS: CPT | Mod: LT

## 2020-02-18 PROCEDURE — 99214 OFFICE O/P EST MOD 30 MIN: CPT

## 2020-02-18 RX ORDER — PANTOPRAZOLE 40 MG/1
40 TABLET, DELAYED RELEASE ORAL
Refills: 0 | Status: ACTIVE | COMMUNITY

## 2020-02-18 RX ORDER — TAMSULOSIN HYDROCHLORIDE 0.4 MG/1
0.4 CAPSULE ORAL
Refills: 0 | Status: ACTIVE | COMMUNITY

## 2020-02-18 RX ORDER — SIMVASTATIN 40 MG/1
40 TABLET, FILM COATED ORAL
Refills: 0 | Status: ACTIVE | COMMUNITY

## 2020-02-18 NOTE — ASSESSMENT
[FreeTextEntry1] : Impression:\par 1. 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 Hiatal hernia\par 3. Anemia - due to a combination of recent blood loss, and known CKD\par \par Plan:\par -As the patient has had GI bleeding twice now, would recommend once daily PPI indefinitely, as he has a hiatal hernia that predisposes him to reflux and he has to eventually go back on aspirin given CAD\par -Discussed that Protonix is best taken in the morning on an empty stomach 30 minutes before breakfast\par -As long as patient is taking PPI for prophylaxis, no objection to resume aspirin\par -Lifestyle modifications for GERD/reflux\par -Avoid NSAIDs other than aspirin\par -Advised that patient should have iron studies (ferritin, TIBC, % saturation) with next blood draw, and if low he might benefit from iron supplementation given CKD; he will have labs drawn with his primary care physician

## 2020-02-18 NOTE — PHYSICAL EXAM
[de-identified] : Patient is well nourished, well-developed, in no acute distress, with appropriate mood and affect. The patient is oriented to time, place, and person. Respirations are even and unlabored. Gait evaluation does reveal a limp. There is no inguinal adenopathy. Examination of the contralateral hip shows normal range of motion, strength, no tenderness, and intact skin. The affected limb is well-perfused and showed 2+ dp/pt pulses, without skin lesions, shows a grossly normal motor and sensory examination. Examination of the hip shows a well-healed surgical scar. Hip motion is full and painless from 0-90 degrees extension to flexion, 20 degrees adduction and 20 degrees abduction, and 15 degrees internal and 30 degrees external rotation. Leg lengths are approximately equal. FADIR is negative and TMA is negative. Stinchfield test is negative. Both hips are stable and muscle strength is normal with good strength with resisted abduction and adduction. Pedal pulses are palpable.  Minimally tender to palpation about the lateral aspect of the hip.\par He has mild tenderness to palpation at the interphalangeal joint at the base of his thumb of the right hand.  Skin overlying is intact.  He is able to tip pinch and can touch the tip of the thumb and oppose to digits 234 and 5.  5 out of 5 strength for EIP, EPL, ADM, FDP, FDS, EDC, WE, WF, , IO.  Sensation is intact to light touch in the M/R/U/a distributions.  2+ radial pulse. [de-identified] : AP pelvis, AP hip, and lateral x-rays of the left hip were ordered and obtained in the office and demonstrate satisfactory position and alignment of the components are present. No signs of loosening are seen.  A nondisplaced fracture of the greater trochanter is noted with abundant callus formation.\par \par AP, oblique, lateral radiographs of the right hand were ordered and obtained in the office and demonstrate no evidence of displaced fracture or dislocation.  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

## 2020-02-18 NOTE — PHYSICAL EXAM
[General Appearance - In No Acute Distress] : in no acute distress [General Appearance - Alert] : alert [Sclera] : the sclera and conjunctiva were normal [Neck Appearance] : the appearance of the neck was normal [] : no respiratory distress [Exaggerated Use Of Accessory Muscles For Inspiration] : no accessory muscle use [Auscultation Breath Sounds / Voice Sounds] : lungs were clear to auscultation bilaterally [Edema] : there was no peripheral edema [Bowel Sounds] : normal bowel sounds [Abdomen Soft] : soft [Abdomen Mass (___ Cm)] : no abdominal mass palpated [Abdomen Tenderness] : non-tender [Involuntary Movements] : no involuntary movements were seen [No Focal Deficits] : no focal deficits [Skin Color & Pigmentation] : normal skin color and pigmentation [Oriented To Time, Place, And Person] : oriented to person, place, and time [Affect] : the affect was normal [Mood] : the mood was normal [FreeTextEntry1] : hard of hearing

## 2020-02-18 NOTE — CONSULT LETTER
[Courtesy Letter:] : I had the pleasure of seeing your patient, [unfilled], in my office today. [Dear  ___] : Dear  [unfilled], [Consult Closing:] : Thank you very much for allowing me to participate in the care of this patient.  If you have any questions, please do not hesitate to contact me. [Please see my note below.] : Please see my note below. [FreeTextEntry2] : Dr. Sivakumar Zimmer\par [address]\[place] [address] [Sincerely,] : Sincerely, [FreeTextEntry3] : Juan Pablo Marrufo MD\par [facility]\par  of Medicine, [facility] [employer] [facility] \[facility]\""/[facility]\par Tel: [phone]\par Fax: [phone]\par

## 2020-02-18 NOTE — HISTORY OF PRESENT ILLNESS
[de-identified] : This is very nice 89-year-old gentleman experiencing left lateral hip pain, which is severe in intensity.  I have previously diagnosed him with a left hip periprosthetic fracture which was nondisplaced and has been treated nonsurgically with conservative management.  He has been at home but has not been working with physical therapy because he was recently hospitalized for period of 2 weeks for a upper GI bleed.  He has not restarted therapy.  He has transition from a walker to a cane.  The pain overall is resolving. Prior to the fall the left total hip arthroplasty was feeling well.  The pain minimally limits activities of daily living. Walking tolerance is no longer reduced.  He takes Tylenol for pain.  Walking tolerance is reduced.  Activities of daily living are limited because of the pain.  He also reports a new complaint of right hand pain at the base of the thumb since the fall.  He has never had this evaluated.  It hurts when he is pinching objects.  He can still turn a key and use his thumb but it just hurts and so he would like an evaluation of this.

## 2020-02-18 NOTE — HISTORY OF PRESENT ILLNESS
[_________] : Performed [unfilled] [Heartburn] : denies heartburn [Nausea] : denies nausea [Vomiting] : denies vomiting [Diarrhea] : denies diarrhea [Constipation] : denies constipation [Yellow Skin Or Eyes (Jaundice)] : denies jaundice [Abdominal Swelling] : denies abdominal swelling [Abdominal Pain] : denies abdominal pain [Rectal Pain] : denies rectal pain [Wt Loss ___ Lbs] : no recent weight loss [de-identified] : This is a 89 year old male with CAD s/p CABG in 1995, CKD 4, s/p PPM, very hard of hearing, who presents for post hospitalization follow-up.\par \par The patient was admitted to [facility] from 1/31/20 to 2/5/20 . He presented with coffee ground emesis and melena. He has had a previous admission for GI bleeding 4-5 years ago at [facility] and had an ulcer at that time. His Hgb at lowest was 6.5 on admission, and he required 3 units of PRBC. He underwent an EGD on 1/31/20 where he was found to have a margoth reddy tear, superficial duodenal clean based ulcers and a duodenal AVM that were treated with clips and bipolar cautery respectively. \par \par Since discharge, the patient has been feeling well. He denies any reflux, GERD, dysphagia, or abdominal pain. His bowel movements have returned to normal brown. He denies any trouble eating. He has been compliant with his Protonix once daily, and takes it with a cup of orange juice in the morning. He is not on any NSAIDs now - he has seen his PMD and was going to resume aspirin 81mg daily in March. \par \par 2/11/20\par WBC 8.3, Hgb 9.6, Hct 31.9, \par Iron 50\par \par 2/4/20: WBC 6.7, Hgb 8.4, Hct 26.9, \par Na 142, K 4.4, Cl 112, Bicarb 19, BUN 62, Cr 2.39, Glucose 98, Ca 8.5\par 2/1/20: T protein 4.8, Albumin 3.2, T bili 1, ALP 56, AST 11, ALT 11 [de-identified] : 1/31/20: \par Findings:\par      A small linear non-bleeding Harleen-Barron tear was found in the distal esophagus measuring 1 \par      cm in length. For hemostasis, two hemostatic clips were successfully placed (MR conditional). \par      There was no bleeding at the end of the procedure.\par      LA Grade B (one or more mucosal breaks greater than 5 mm, not extending between the tops of \par      two mucosal folds) esophagitis with no bleeding was found.\par      The exam of the esophagus was otherwise normal.\par      A 3 cm hiatus hernia was present.\par      The entire examined stomach was normal.\par      Patchy moderate inflammation characterized by congestion (edema), erythema, friability and \par      linear erosions was found in the duodenal bulb.\par      Many superficial duodenal ulcers with a clean ulcer base (Jacob Class III) as well as \par      duodentitis characterized by edema and erythema were found in the first part of the duodenum.\par      A single spot with bleeding consistent with a Dieulafoy lesion was found in the second part \par      of the duodenum. Coagulation for hemostasis using bipolar probe was successful. Estimated \par      blood loss: none.\par      The exam of the duodenum was otherwise normal.\par                                                                                                     \par Impression:          - Harleen-Barron tear in the lower esophagus without active bleeding. 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

## 2020-02-18 NOTE — DISCUSSION/SUMMARY
[de-identified] : This patient has a nondisplaced greater trochanteric fracture of the left hip which is a periprosthetic fracture on his left total of arthroplasty which appears stable and is healing with conservative care.  He can continue to be weightbearing as tolerated with posterior hip precautions.  Recommended to avoid active abduction at this time for a period of 3 months which would be 1 more month from now.  New physical therapy prescription provided.  For the right hand he can do home exercises including to pinch exercises and opposition exercises as well as squeezing a squeeze ball.  If this continues to bother him that he should follow-up with a hand surgeon for further guidance.  Tylenol for pain.

## 2020-06-01 ENCOUNTER — APPOINTMENT (OUTPATIENT)
Dept: GASTROENTEROLOGY | Facility: CLINIC | Age: 85
End: 2020-06-01

## 2022-01-01 ENCOUNTER — NON-APPOINTMENT (OUTPATIENT)
Age: 87
End: 2022-01-01

## 2022-01-01 ENCOUNTER — INPATIENT (INPATIENT)
Facility: HOSPITAL | Age: 87
LOS: 10 days | DRG: 870 | End: 2023-01-09
Attending: SURGERY | Admitting: SURGERY
Payer: MEDICARE

## 2022-01-01 VITALS
SYSTOLIC BLOOD PRESSURE: 92 MMHG | RESPIRATION RATE: 16 BRPM | DIASTOLIC BLOOD PRESSURE: 59 MMHG | OXYGEN SATURATION: 85 % | TEMPERATURE: 82 F | HEART RATE: 65 BPM

## 2022-01-01 DIAGNOSIS — Z98.890 OTHER SPECIFIED POSTPROCEDURAL STATES: Chronic | ICD-10-CM

## 2022-01-01 DIAGNOSIS — S09.93XA UNSPECIFIED INJURY OF FACE, INITIAL ENCOUNTER: ICD-10-CM

## 2022-01-01 DIAGNOSIS — Z95.1 PRESENCE OF AORTOCORONARY BYPASS GRAFT: Chronic | ICD-10-CM

## 2022-01-01 DIAGNOSIS — Z95.0 PRESENCE OF CARDIAC PACEMAKER: Chronic | ICD-10-CM

## 2022-01-01 LAB
-  STREPTOCOCCUS SP. (NOT GRP A, B OR S PNEUMONIAE): SIGNIFICANT CHANGE UP
A1C WITH ESTIMATED AVERAGE GLUCOSE RESULT: 5.6 % — SIGNIFICANT CHANGE UP (ref 4–5.6)
ALBUMIN SERPL ELPH-MCNC: 2.3 G/DL — LOW (ref 3.3–5)
ALBUMIN SERPL ELPH-MCNC: 3 G/DL — LOW (ref 3.3–5)
ALP SERPL-CCNC: 49 U/L — SIGNIFICANT CHANGE UP (ref 40–120)
ALP SERPL-CCNC: 78 U/L — SIGNIFICANT CHANGE UP (ref 40–120)
ALT FLD-CCNC: 12 U/L — SIGNIFICANT CHANGE UP (ref 10–45)
ALT FLD-CCNC: 15 U/L — SIGNIFICANT CHANGE UP (ref 10–45)
AMMONIA BLD-MCNC: 26 UMOL/L — SIGNIFICANT CHANGE UP (ref 11–55)
ANION GAP SERPL CALC-SCNC: 10 MMOL/L — SIGNIFICANT CHANGE UP (ref 5–17)
ANION GAP SERPL CALC-SCNC: 11 MMOL/L — SIGNIFICANT CHANGE UP (ref 5–17)
ANION GAP SERPL CALC-SCNC: 12 MMOL/L — SIGNIFICANT CHANGE UP (ref 5–17)
ANION GAP SERPL CALC-SCNC: 13 MMOL/L — SIGNIFICANT CHANGE UP (ref 5–17)
ANION GAP SERPL CALC-SCNC: 13 MMOL/L — SIGNIFICANT CHANGE UP (ref 5–17)
ANION GAP SERPL CALC-SCNC: 14 MMOL/L — SIGNIFICANT CHANGE UP (ref 5–17)
ANION GAP SERPL CALC-SCNC: 14 MMOL/L — SIGNIFICANT CHANGE UP (ref 5–17)
APPEARANCE UR: CLEAR — SIGNIFICANT CHANGE UP
APTT BLD: 35.5 SEC — SIGNIFICANT CHANGE UP (ref 27.5–35.5)
APTT BLD: 37.4 SEC — HIGH (ref 27.5–35.5)
APTT BLD: 38.5 SEC — HIGH (ref 27.5–35.5)
AST SERPL-CCNC: 18 U/L — SIGNIFICANT CHANGE UP (ref 10–40)
AST SERPL-CCNC: 26 U/L — SIGNIFICANT CHANGE UP (ref 10–40)
BASE EXCESS BLDV CALC-SCNC: -2.2 MMOL/L — LOW (ref -2–3)
BASE EXCESS BLDV CALC-SCNC: -6.1 MMOL/L — LOW (ref -2–3)
BASE EXCESS BLDV CALC-SCNC: -8.3 MMOL/L — LOW (ref -2–3)
BASE EXCESS BLDV CALC-SCNC: -9.3 MMOL/L — LOW (ref -2–3)
BASE EXCESS BLDV CALC-SCNC: -9.7 MMOL/L — LOW (ref -2–3)
BASOPHILS # BLD AUTO: 0.02 K/UL — SIGNIFICANT CHANGE UP (ref 0–0.2)
BASOPHILS NFR BLD AUTO: 0.2 % — SIGNIFICANT CHANGE UP (ref 0–2)
BILIRUB DIRECT SERPL-MCNC: 0.2 MG/DL — SIGNIFICANT CHANGE UP (ref 0–0.3)
BILIRUB INDIRECT FLD-MCNC: 0.4 MG/DL — SIGNIFICANT CHANGE UP (ref 0.2–1)
BILIRUB SERPL-MCNC: 0.6 MG/DL — SIGNIFICANT CHANGE UP (ref 0.2–1.2)
BILIRUB SERPL-MCNC: 1.1 MG/DL — SIGNIFICANT CHANGE UP (ref 0.2–1.2)
BILIRUB UR-MCNC: NEGATIVE — SIGNIFICANT CHANGE UP
BLD GP AB SCN SERPL QL: NEGATIVE — SIGNIFICANT CHANGE UP
BUN SERPL-MCNC: 69 MG/DL — HIGH (ref 7–23)
BUN SERPL-MCNC: 70 MG/DL — HIGH (ref 7–23)
BUN SERPL-MCNC: 70 MG/DL — HIGH (ref 7–23)
BUN SERPL-MCNC: 71 MG/DL — HIGH (ref 7–23)
BUN SERPL-MCNC: 72 MG/DL — HIGH (ref 7–23)
BUN SERPL-MCNC: 72 MG/DL — HIGH (ref 7–23)
BUN SERPL-MCNC: 73 MG/DL — HIGH (ref 7–23)
BUN SERPL-MCNC: 75 MG/DL — HIGH (ref 7–23)
BUN SERPL-MCNC: 77 MG/DL — HIGH (ref 7–23)
BUN SERPL-MCNC: 85 MG/DL — HIGH (ref 7–23)
CA-I SERPL-SCNC: 1.16 MMOL/L — SIGNIFICANT CHANGE UP (ref 1.15–1.33)
CA-I SERPL-SCNC: 1.17 MMOL/L — SIGNIFICANT CHANGE UP (ref 1.15–1.33)
CALCIUM SERPL-MCNC: 7.3 MG/DL — LOW (ref 8.4–10.5)
CALCIUM SERPL-MCNC: 7.3 MG/DL — LOW (ref 8.4–10.5)
CALCIUM SERPL-MCNC: 7.4 MG/DL — LOW (ref 8.4–10.5)
CALCIUM SERPL-MCNC: 7.4 MG/DL — LOW (ref 8.4–10.5)
CALCIUM SERPL-MCNC: 7.5 MG/DL — LOW (ref 8.4–10.5)
CALCIUM SERPL-MCNC: 7.6 MG/DL — LOW (ref 8.4–10.5)
CALCIUM SERPL-MCNC: 7.7 MG/DL — LOW (ref 8.4–10.5)
CALCIUM SERPL-MCNC: 7.8 MG/DL — LOW (ref 8.4–10.5)
CALCIUM SERPL-MCNC: 7.9 MG/DL — LOW (ref 8.4–10.5)
CALCIUM SERPL-MCNC: 7.9 MG/DL — LOW (ref 8.4–10.5)
CALCIUM SERPL-MCNC: 8.2 MG/DL — LOW (ref 8.4–10.5)
CHLORIDE BLDV-SCNC: 106 MMOL/L — SIGNIFICANT CHANGE UP (ref 96–108)
CHLORIDE BLDV-SCNC: 108 MMOL/L — SIGNIFICANT CHANGE UP (ref 96–108)
CHLORIDE SERPL-SCNC: 100 MMOL/L — SIGNIFICANT CHANGE UP (ref 96–108)
CHLORIDE SERPL-SCNC: 106 MMOL/L — SIGNIFICANT CHANGE UP (ref 96–108)
CHLORIDE SERPL-SCNC: 106 MMOL/L — SIGNIFICANT CHANGE UP (ref 96–108)
CHLORIDE SERPL-SCNC: 107 MMOL/L — SIGNIFICANT CHANGE UP (ref 96–108)
CHLORIDE SERPL-SCNC: 108 MMOL/L — SIGNIFICANT CHANGE UP (ref 96–108)
CHLORIDE SERPL-SCNC: 110 MMOL/L — HIGH (ref 96–108)
CK SERPL-CCNC: 110 U/L — SIGNIFICANT CHANGE UP (ref 30–200)
CK SERPL-CCNC: 118 U/L — SIGNIFICANT CHANGE UP (ref 30–200)
CK SERPL-CCNC: 140 U/L — SIGNIFICANT CHANGE UP (ref 30–200)
CK SERPL-CCNC: 156 U/L — SIGNIFICANT CHANGE UP (ref 30–200)
CK SERPL-CCNC: 160 U/L — SIGNIFICANT CHANGE UP (ref 30–200)
CK SERPL-CCNC: 189 U/L — SIGNIFICANT CHANGE UP (ref 30–200)
CK SERPL-CCNC: 216 U/L — HIGH (ref 30–200)
CK SERPL-CCNC: 267 U/L — HIGH (ref 30–200)
CK SERPL-CCNC: 277 U/L — HIGH (ref 30–200)
CK SERPL-CCNC: 284 U/L — HIGH (ref 30–200)
CK SERPL-CCNC: 310 U/L — HIGH (ref 30–200)
CK SERPL-CCNC: 366 U/L — HIGH (ref 30–200)
CK SERPL-CCNC: 392 U/L — HIGH (ref 30–200)
CK SERPL-CCNC: 444 U/L — HIGH (ref 30–200)
CK SERPL-CCNC: 478 U/L — HIGH (ref 30–200)
CO2 BLDV-SCNC: 18 MMOL/L — LOW (ref 22–26)
CO2 BLDV-SCNC: 19 MMOL/L — LOW (ref 22–26)
CO2 BLDV-SCNC: 20 MMOL/L — LOW (ref 22–26)
CO2 BLDV-SCNC: 22 MMOL/L — SIGNIFICANT CHANGE UP (ref 22–26)
CO2 BLDV-SCNC: 25 MMOL/L — SIGNIFICANT CHANGE UP (ref 22–26)
CO2 SERPL-SCNC: 16 MMOL/L — LOW (ref 22–31)
CO2 SERPL-SCNC: 16 MMOL/L — LOW (ref 22–31)
CO2 SERPL-SCNC: 17 MMOL/L — LOW (ref 22–31)
CO2 SERPL-SCNC: 18 MMOL/L — LOW (ref 22–31)
CO2 SERPL-SCNC: 19 MMOL/L — LOW (ref 22–31)
CO2 SERPL-SCNC: 19 MMOL/L — LOW (ref 22–31)
CO2 SERPL-SCNC: 21 MMOL/L — LOW (ref 22–31)
CO2 SERPL-SCNC: 22 MMOL/L — SIGNIFICANT CHANGE UP (ref 22–31)
CO2 SERPL-SCNC: 23 MMOL/L — SIGNIFICANT CHANGE UP (ref 22–31)
COLOR SPEC: SIGNIFICANT CHANGE UP
CREAT SERPL-MCNC: 2.84 MG/DL — HIGH (ref 0.5–1.3)
CREAT SERPL-MCNC: 2.93 MG/DL — HIGH (ref 0.5–1.3)
CREAT SERPL-MCNC: 2.96 MG/DL — HIGH (ref 0.5–1.3)
CREAT SERPL-MCNC: 2.99 MG/DL — HIGH (ref 0.5–1.3)
CREAT SERPL-MCNC: 3.05 MG/DL — HIGH (ref 0.5–1.3)
CREAT SERPL-MCNC: 3.06 MG/DL — HIGH (ref 0.5–1.3)
CREAT SERPL-MCNC: 3.06 MG/DL — HIGH (ref 0.5–1.3)
CREAT SERPL-MCNC: 3.07 MG/DL — HIGH (ref 0.5–1.3)
CREAT SERPL-MCNC: 3.08 MG/DL — HIGH (ref 0.5–1.3)
CREAT SERPL-MCNC: 3.09 MG/DL — HIGH (ref 0.5–1.3)
CREAT SERPL-MCNC: 3.1 MG/DL — HIGH (ref 0.5–1.3)
CREAT SERPL-MCNC: 3.11 MG/DL — HIGH (ref 0.5–1.3)
CREAT SERPL-MCNC: 3.12 MG/DL — HIGH (ref 0.5–1.3)
CREAT SERPL-MCNC: 3.17 MG/DL — HIGH (ref 0.5–1.3)
CREAT SERPL-MCNC: 3.37 MG/DL — HIGH (ref 0.5–1.3)
CULTURE RESULTS: SIGNIFICANT CHANGE UP
CULTURE RESULTS: SIGNIFICANT CHANGE UP
DIFF PNL FLD: NEGATIVE — SIGNIFICANT CHANGE UP
EGFR: 16 ML/MIN/1.73M2 — LOW
EGFR: 17 ML/MIN/1.73M2 — LOW
EGFR: 18 ML/MIN/1.73M2 — LOW
EGFR: 19 ML/MIN/1.73M2 — LOW
EGFR: 19 ML/MIN/1.73M2 — LOW
EOSINOPHIL # BLD AUTO: 0.01 K/UL — SIGNIFICANT CHANGE UP (ref 0–0.5)
EOSINOPHIL NFR BLD AUTO: 0.1 % — SIGNIFICANT CHANGE UP (ref 0–6)
ESTIMATED AVERAGE GLUCOSE: 114 MG/DL — SIGNIFICANT CHANGE UP (ref 68–114)
ETHANOL SERPL-MCNC: <10 MG/DL — SIGNIFICANT CHANGE UP (ref 0–10)
FLUAV AG NPH QL: SIGNIFICANT CHANGE UP
FLUBV AG NPH QL: SIGNIFICANT CHANGE UP
GAS PNL BLDA: SIGNIFICANT CHANGE UP
GAS PNL BLDV: 133 MMOL/L — LOW (ref 136–145)
GAS PNL BLDV: 134 MMOL/L — LOW (ref 136–145)
GAS PNL BLDV: SIGNIFICANT CHANGE UP
GLUCOSE BLDC GLUCOMTR-MCNC: 100 MG/DL — HIGH (ref 70–99)
GLUCOSE BLDC GLUCOMTR-MCNC: 107 MG/DL — HIGH (ref 70–99)
GLUCOSE BLDC GLUCOMTR-MCNC: 109 MG/DL — HIGH (ref 70–99)
GLUCOSE BLDC GLUCOMTR-MCNC: 117 MG/DL — HIGH (ref 70–99)
GLUCOSE BLDC GLUCOMTR-MCNC: 118 MG/DL — HIGH (ref 70–99)
GLUCOSE BLDC GLUCOMTR-MCNC: 125 MG/DL — HIGH (ref 70–99)
GLUCOSE BLDC GLUCOMTR-MCNC: 144 MG/DL — HIGH (ref 70–99)
GLUCOSE BLDC GLUCOMTR-MCNC: 163 MG/DL — HIGH (ref 70–99)
GLUCOSE BLDC GLUCOMTR-MCNC: 198 MG/DL — HIGH (ref 70–99)
GLUCOSE BLDC GLUCOMTR-MCNC: 248 MG/DL — HIGH (ref 70–99)
GLUCOSE BLDC GLUCOMTR-MCNC: 94 MG/DL — SIGNIFICANT CHANGE UP (ref 70–99)
GLUCOSE BLDV-MCNC: 109 MG/DL — HIGH (ref 70–99)
GLUCOSE BLDV-MCNC: 110 MG/DL — HIGH (ref 70–99)
GLUCOSE SERPL-MCNC: 107 MG/DL — HIGH (ref 70–99)
GLUCOSE SERPL-MCNC: 110 MG/DL — HIGH (ref 70–99)
GLUCOSE SERPL-MCNC: 115 MG/DL — HIGH (ref 70–99)
GLUCOSE SERPL-MCNC: 119 MG/DL — HIGH (ref 70–99)
GLUCOSE SERPL-MCNC: 120 MG/DL — HIGH (ref 70–99)
GLUCOSE SERPL-MCNC: 120 MG/DL — HIGH (ref 70–99)
GLUCOSE SERPL-MCNC: 129 MG/DL — HIGH (ref 70–99)
GLUCOSE SERPL-MCNC: 137 MG/DL — HIGH (ref 70–99)
GLUCOSE SERPL-MCNC: 154 MG/DL — HIGH (ref 70–99)
GLUCOSE SERPL-MCNC: 164 MG/DL — HIGH (ref 70–99)
GLUCOSE SERPL-MCNC: 180 MG/DL — HIGH (ref 70–99)
GLUCOSE SERPL-MCNC: 205 MG/DL — HIGH (ref 70–99)
GLUCOSE SERPL-MCNC: 222 MG/DL — HIGH (ref 70–99)
GLUCOSE SERPL-MCNC: 91 MG/DL — SIGNIFICANT CHANGE UP (ref 70–99)
GLUCOSE SERPL-MCNC: 98 MG/DL — SIGNIFICANT CHANGE UP (ref 70–99)
GLUCOSE UR QL: NEGATIVE — SIGNIFICANT CHANGE UP
GRAM STN FLD: SIGNIFICANT CHANGE UP
GRAM STN FLD: SIGNIFICANT CHANGE UP
HCO3 BLDV-SCNC: 17 MMOL/L — LOW (ref 22–29)
HCO3 BLDV-SCNC: 18 MMOL/L — LOW (ref 22–29)
HCO3 BLDV-SCNC: 18 MMOL/L — LOW (ref 22–29)
HCO3 BLDV-SCNC: 20 MMOL/L — LOW (ref 22–29)
HCO3 BLDV-SCNC: 24 MMOL/L — SIGNIFICANT CHANGE UP (ref 22–29)
HCT VFR BLD CALC: 23.4 % — LOW (ref 39–50)
HCT VFR BLD CALC: 23.4 % — LOW (ref 39–50)
HCT VFR BLD CALC: 23.5 % — LOW (ref 39–50)
HCT VFR BLD CALC: 24.2 % — LOW (ref 39–50)
HCT VFR BLD CALC: 24.3 % — LOW (ref 39–50)
HCT VFR BLD CALC: 25.3 % — LOW (ref 39–50)
HCT VFR BLD CALC: 25.3 % — LOW (ref 39–50)
HCT VFR BLD CALC: 25.8 % — LOW (ref 39–50)
HCT VFR BLD CALC: 26.8 % — LOW (ref 39–50)
HCT VFR BLD CALC: 27.3 % — LOW (ref 39–50)
HCT VFR BLD CALC: 27.7 % — LOW (ref 39–50)
HCT VFR BLD CALC: 28.8 % — LOW (ref 39–50)
HCT VFR BLD CALC: 28.8 % — LOW (ref 39–50)
HCT VFR BLD CALC: 29.2 % — LOW (ref 39–50)
HCT VFR BLD CALC: 29.3 % — LOW (ref 39–50)
HCT VFR BLDA CALC: 27 % — LOW (ref 39–51)
HCT VFR BLDA CALC: 27 % — LOW (ref 39–51)
HGB BLD CALC-MCNC: 8.9 G/DL — LOW (ref 12.6–17.4)
HGB BLD CALC-MCNC: 9.1 G/DL — LOW (ref 12.6–17.4)
HGB BLD-MCNC: 7.4 G/DL — LOW (ref 13–17)
HGB BLD-MCNC: 7.5 G/DL — LOW (ref 13–17)
HGB BLD-MCNC: 7.5 G/DL — LOW (ref 13–17)
HGB BLD-MCNC: 7.8 G/DL — LOW (ref 13–17)
HGB BLD-MCNC: 7.8 G/DL — LOW (ref 13–17)
HGB BLD-MCNC: 8.1 G/DL — LOW (ref 13–17)
HGB BLD-MCNC: 8.2 G/DL — LOW (ref 13–17)
HGB BLD-MCNC: 8.2 G/DL — LOW (ref 13–17)
HGB BLD-MCNC: 8.7 G/DL — LOW (ref 13–17)
HGB BLD-MCNC: 8.8 G/DL — LOW (ref 13–17)
HGB BLD-MCNC: 8.9 G/DL — LOW (ref 13–17)
HGB BLD-MCNC: 8.9 G/DL — LOW (ref 13–17)
HGB BLD-MCNC: 9.4 G/DL — LOW (ref 13–17)
HGB BLD-MCNC: 9.5 G/DL — LOW (ref 13–17)
HGB BLD-MCNC: 9.6 G/DL — LOW (ref 13–17)
HOROWITZ INDEX BLDV+IHG-RTO: 30 — SIGNIFICANT CHANGE UP
IMM GRANULOCYTES NFR BLD AUTO: 0.7 % — SIGNIFICANT CHANGE UP (ref 0–0.9)
INR BLD: 1.3 RATIO — HIGH (ref 0.88–1.16)
INR BLD: 1.35 RATIO — HIGH (ref 0.88–1.16)
INR BLD: 1.52 RATIO — HIGH (ref 0.88–1.16)
KETONES UR-MCNC: NEGATIVE — SIGNIFICANT CHANGE UP
LACTATE BLDV-MCNC: 0.8 MMOL/L — SIGNIFICANT CHANGE UP (ref 0.5–2)
LACTATE BLDV-MCNC: 0.8 MMOL/L — SIGNIFICANT CHANGE UP (ref 0.5–2)
LACTATE SERPL-SCNC: 4.7 MMOL/L — CRITICAL HIGH (ref 0.5–2)
LEUKOCYTE ESTERASE UR-ACNC: NEGATIVE — SIGNIFICANT CHANGE UP
LIDOCAIN IGE QN: 18 U/L — SIGNIFICANT CHANGE UP (ref 7–60)
LYMPHOCYTES # BLD AUTO: 0.65 K/UL — LOW (ref 1–3.3)
LYMPHOCYTES # BLD AUTO: 6 % — LOW (ref 13–44)
MAGNESIUM SERPL-MCNC: 1.7 MG/DL — SIGNIFICANT CHANGE UP (ref 1.6–2.6)
MAGNESIUM SERPL-MCNC: 1.7 MG/DL — SIGNIFICANT CHANGE UP (ref 1.6–2.6)
MAGNESIUM SERPL-MCNC: 2.1 MG/DL — SIGNIFICANT CHANGE UP (ref 1.6–2.6)
MAGNESIUM SERPL-MCNC: 2.1 MG/DL — SIGNIFICANT CHANGE UP (ref 1.6–2.6)
MAGNESIUM SERPL-MCNC: 2.2 MG/DL — SIGNIFICANT CHANGE UP (ref 1.6–2.6)
MAGNESIUM SERPL-MCNC: 2.3 MG/DL — SIGNIFICANT CHANGE UP (ref 1.6–2.6)
MAGNESIUM SERPL-MCNC: 2.4 MG/DL — SIGNIFICANT CHANGE UP (ref 1.6–2.6)
MCHC RBC-ENTMCNC: 30.9 GM/DL — LOW (ref 32–36)
MCHC RBC-ENTMCNC: 31.1 PG — SIGNIFICANT CHANGE UP (ref 27–34)
MCHC RBC-ENTMCNC: 31.3 PG — SIGNIFICANT CHANGE UP (ref 27–34)
MCHC RBC-ENTMCNC: 31.4 PG — SIGNIFICANT CHANGE UP (ref 27–34)
MCHC RBC-ENTMCNC: 31.5 PG — SIGNIFICANT CHANGE UP (ref 27–34)
MCHC RBC-ENTMCNC: 31.6 GM/DL — LOW (ref 32–36)
MCHC RBC-ENTMCNC: 31.6 PG — SIGNIFICANT CHANGE UP (ref 27–34)
MCHC RBC-ENTMCNC: 31.7 PG — SIGNIFICANT CHANGE UP (ref 27–34)
MCHC RBC-ENTMCNC: 31.8 GM/DL — LOW (ref 32–36)
MCHC RBC-ENTMCNC: 31.9 GM/DL — LOW (ref 32–36)
MCHC RBC-ENTMCNC: 31.9 GM/DL — LOW (ref 32–36)
MCHC RBC-ENTMCNC: 31.9 PG — SIGNIFICANT CHANGE UP (ref 27–34)
MCHC RBC-ENTMCNC: 31.9 PG — SIGNIFICANT CHANGE UP (ref 27–34)
MCHC RBC-ENTMCNC: 32 GM/DL — SIGNIFICANT CHANGE UP (ref 32–36)
MCHC RBC-ENTMCNC: 32 PG — SIGNIFICANT CHANGE UP (ref 27–34)
MCHC RBC-ENTMCNC: 32.1 GM/DL — SIGNIFICANT CHANGE UP (ref 32–36)
MCHC RBC-ENTMCNC: 32.2 GM/DL — SIGNIFICANT CHANGE UP (ref 32–36)
MCHC RBC-ENTMCNC: 32.4 GM/DL — SIGNIFICANT CHANGE UP (ref 32–36)
MCHC RBC-ENTMCNC: 32.4 GM/DL — SIGNIFICANT CHANGE UP (ref 32–36)
MCHC RBC-ENTMCNC: 32.6 GM/DL — SIGNIFICANT CHANGE UP (ref 32–36)
MCHC RBC-ENTMCNC: 32.8 GM/DL — SIGNIFICANT CHANGE UP (ref 32–36)
MCHC RBC-ENTMCNC: 32.9 GM/DL — SIGNIFICANT CHANGE UP (ref 32–36)
MCV RBC AUTO: 100.8 FL — HIGH (ref 80–100)
MCV RBC AUTO: 102.1 FL — HIGH (ref 80–100)
MCV RBC AUTO: 96.1 FL — SIGNIFICANT CHANGE UP (ref 80–100)
MCV RBC AUTO: 96.3 FL — SIGNIFICANT CHANGE UP (ref 80–100)
MCV RBC AUTO: 96.4 FL — SIGNIFICANT CHANGE UP (ref 80–100)
MCV RBC AUTO: 97 FL — SIGNIFICANT CHANGE UP (ref 80–100)
MCV RBC AUTO: 97.5 FL — SIGNIFICANT CHANGE UP (ref 80–100)
MCV RBC AUTO: 97.6 FL — SIGNIFICANT CHANGE UP (ref 80–100)
MCV RBC AUTO: 97.6 FL — SIGNIFICANT CHANGE UP (ref 80–100)
MCV RBC AUTO: 98.1 FL — SIGNIFICANT CHANGE UP (ref 80–100)
MCV RBC AUTO: 98.2 FL — SIGNIFICANT CHANGE UP (ref 80–100)
MCV RBC AUTO: 98.3 FL — SIGNIFICANT CHANGE UP (ref 80–100)
MCV RBC AUTO: 98.4 FL — SIGNIFICANT CHANGE UP (ref 80–100)
MCV RBC AUTO: 98.7 FL — SIGNIFICANT CHANGE UP (ref 80–100)
MCV RBC AUTO: 99.2 FL — SIGNIFICANT CHANGE UP (ref 80–100)
METHOD TYPE: SIGNIFICANT CHANGE UP
MONOCYTES # BLD AUTO: 0.58 K/UL — SIGNIFICANT CHANGE UP (ref 0–0.9)
MONOCYTES NFR BLD AUTO: 5.4 % — SIGNIFICANT CHANGE UP (ref 2–14)
MRSA PCR RESULT.: SIGNIFICANT CHANGE UP
NEUTROPHILS # BLD AUTO: 9.47 K/UL — HIGH (ref 1.8–7.4)
NEUTROPHILS NFR BLD AUTO: 87.6 % — HIGH (ref 43–77)
NITRITE UR-MCNC: NEGATIVE — SIGNIFICANT CHANGE UP
NRBC # BLD: 0 /100 WBCS — SIGNIFICANT CHANGE UP (ref 0–0)
ORGANISM # SPEC MICROSCOPIC CNT: SIGNIFICANT CHANGE UP
ORGANISM # SPEC MICROSCOPIC CNT: SIGNIFICANT CHANGE UP
PCO2 BLDV: 41 MMHG — LOW (ref 42–55)
PCO2 BLDV: 41 MMHG — LOW (ref 42–55)
PCO2 BLDV: 42 MMHG — SIGNIFICANT CHANGE UP (ref 42–55)
PCO2 BLDV: 42 MMHG — SIGNIFICANT CHANGE UP (ref 42–55)
PCO2 BLDV: 44 MMHG — SIGNIFICANT CHANGE UP (ref 42–55)
PH BLDV: 7.23 — LOW (ref 7.32–7.43)
PH BLDV: 7.23 — LOW (ref 7.32–7.43)
PH BLDV: 7.26 — LOW (ref 7.32–7.43)
PH BLDV: 7.29 — LOW (ref 7.32–7.43)
PH BLDV: 7.34 — SIGNIFICANT CHANGE UP (ref 7.32–7.43)
PH UR: 5 — SIGNIFICANT CHANGE UP (ref 5–8)
PHOSPHATE SERPL-MCNC: 2.9 MG/DL — SIGNIFICANT CHANGE UP (ref 2.5–4.5)
PHOSPHATE SERPL-MCNC: 3.5 MG/DL — SIGNIFICANT CHANGE UP (ref 2.5–4.5)
PHOSPHATE SERPL-MCNC: 4.7 MG/DL — HIGH (ref 2.5–4.5)
PHOSPHATE SERPL-MCNC: 4.8 MG/DL — HIGH (ref 2.5–4.5)
PHOSPHATE SERPL-MCNC: 4.9 MG/DL — HIGH (ref 2.5–4.5)
PHOSPHATE SERPL-MCNC: 5 MG/DL — HIGH (ref 2.5–4.5)
PHOSPHATE SERPL-MCNC: 5.2 MG/DL — HIGH (ref 2.5–4.5)
PHOSPHATE SERPL-MCNC: 5.5 MG/DL — HIGH (ref 2.5–4.5)
PHOSPHATE SERPL-MCNC: 5.9 MG/DL — HIGH (ref 2.5–4.5)
PLATELET # BLD AUTO: 104 K/UL — LOW (ref 150–400)
PLATELET # BLD AUTO: 104 K/UL — LOW (ref 150–400)
PLATELET # BLD AUTO: 110 K/UL — LOW (ref 150–400)
PLATELET # BLD AUTO: 110 K/UL — LOW (ref 150–400)
PLATELET # BLD AUTO: 113 K/UL — LOW (ref 150–400)
PLATELET # BLD AUTO: 114 K/UL — LOW (ref 150–400)
PLATELET # BLD AUTO: 118 K/UL — LOW (ref 150–400)
PLATELET # BLD AUTO: 119 K/UL — LOW (ref 150–400)
PLATELET # BLD AUTO: 129 K/UL — LOW (ref 150–400)
PLATELET # BLD AUTO: 130 K/UL — LOW (ref 150–400)
PLATELET # BLD AUTO: 132 K/UL — LOW (ref 150–400)
PLATELET # BLD AUTO: 144 K/UL — LOW (ref 150–400)
PLATELET # BLD AUTO: 149 K/UL — LOW (ref 150–400)
PLATELET # BLD AUTO: 161 K/UL — SIGNIFICANT CHANGE UP (ref 150–400)
PLATELET # BLD AUTO: 165 K/UL — SIGNIFICANT CHANGE UP (ref 150–400)
PO2 BLDV: 42 MMHG — SIGNIFICANT CHANGE UP (ref 25–45)
PO2 BLDV: 43 MMHG — SIGNIFICANT CHANGE UP (ref 25–45)
PO2 BLDV: 43 MMHG — SIGNIFICANT CHANGE UP (ref 25–45)
PO2 BLDV: 44 MMHG — SIGNIFICANT CHANGE UP (ref 25–45)
PO2 BLDV: 47 MMHG — HIGH (ref 25–45)
POTASSIUM BLDV-SCNC: 4.4 MMOL/L — SIGNIFICANT CHANGE UP (ref 3.5–5.1)
POTASSIUM BLDV-SCNC: 4.5 MMOL/L — SIGNIFICANT CHANGE UP (ref 3.5–5.1)
POTASSIUM SERPL-MCNC: 3.6 MMOL/L — SIGNIFICANT CHANGE UP (ref 3.5–5.3)
POTASSIUM SERPL-MCNC: 4.1 MMOL/L — SIGNIFICANT CHANGE UP (ref 3.5–5.3)
POTASSIUM SERPL-MCNC: 4.2 MMOL/L — SIGNIFICANT CHANGE UP (ref 3.5–5.3)
POTASSIUM SERPL-MCNC: 4.3 MMOL/L — SIGNIFICANT CHANGE UP (ref 3.5–5.3)
POTASSIUM SERPL-MCNC: 4.3 MMOL/L — SIGNIFICANT CHANGE UP (ref 3.5–5.3)
POTASSIUM SERPL-MCNC: 4.4 MMOL/L — SIGNIFICANT CHANGE UP (ref 3.5–5.3)
POTASSIUM SERPL-MCNC: 4.5 MMOL/L — SIGNIFICANT CHANGE UP (ref 3.5–5.3)
POTASSIUM SERPL-MCNC: 4.5 MMOL/L — SIGNIFICANT CHANGE UP (ref 3.5–5.3)
POTASSIUM SERPL-MCNC: 4.6 MMOL/L — SIGNIFICANT CHANGE UP (ref 3.5–5.3)
POTASSIUM SERPL-MCNC: 4.6 MMOL/L — SIGNIFICANT CHANGE UP (ref 3.5–5.3)
POTASSIUM SERPL-MCNC: 4.7 MMOL/L — SIGNIFICANT CHANGE UP (ref 3.5–5.3)
POTASSIUM SERPL-MCNC: 4.8 MMOL/L — SIGNIFICANT CHANGE UP (ref 3.5–5.3)
POTASSIUM SERPL-MCNC: 4.9 MMOL/L — SIGNIFICANT CHANGE UP (ref 3.5–5.3)
POTASSIUM SERPL-MCNC: 4.9 MMOL/L — SIGNIFICANT CHANGE UP (ref 3.5–5.3)
POTASSIUM SERPL-MCNC: 5 MMOL/L — SIGNIFICANT CHANGE UP (ref 3.5–5.3)
POTASSIUM SERPL-SCNC: 3.6 MMOL/L — SIGNIFICANT CHANGE UP (ref 3.5–5.3)
POTASSIUM SERPL-SCNC: 4.1 MMOL/L — SIGNIFICANT CHANGE UP (ref 3.5–5.3)
POTASSIUM SERPL-SCNC: 4.2 MMOL/L — SIGNIFICANT CHANGE UP (ref 3.5–5.3)
POTASSIUM SERPL-SCNC: 4.3 MMOL/L — SIGNIFICANT CHANGE UP (ref 3.5–5.3)
POTASSIUM SERPL-SCNC: 4.3 MMOL/L — SIGNIFICANT CHANGE UP (ref 3.5–5.3)
POTASSIUM SERPL-SCNC: 4.4 MMOL/L — SIGNIFICANT CHANGE UP (ref 3.5–5.3)
POTASSIUM SERPL-SCNC: 4.5 MMOL/L — SIGNIFICANT CHANGE UP (ref 3.5–5.3)
POTASSIUM SERPL-SCNC: 4.5 MMOL/L — SIGNIFICANT CHANGE UP (ref 3.5–5.3)
POTASSIUM SERPL-SCNC: 4.6 MMOL/L — SIGNIFICANT CHANGE UP (ref 3.5–5.3)
POTASSIUM SERPL-SCNC: 4.6 MMOL/L — SIGNIFICANT CHANGE UP (ref 3.5–5.3)
POTASSIUM SERPL-SCNC: 4.7 MMOL/L — SIGNIFICANT CHANGE UP (ref 3.5–5.3)
POTASSIUM SERPL-SCNC: 4.8 MMOL/L — SIGNIFICANT CHANGE UP (ref 3.5–5.3)
POTASSIUM SERPL-SCNC: 4.9 MMOL/L — SIGNIFICANT CHANGE UP (ref 3.5–5.3)
POTASSIUM SERPL-SCNC: 4.9 MMOL/L — SIGNIFICANT CHANGE UP (ref 3.5–5.3)
POTASSIUM SERPL-SCNC: 5 MMOL/L — SIGNIFICANT CHANGE UP (ref 3.5–5.3)
PROCALCITONIN SERPL-MCNC: 0.46 NG/ML — HIGH (ref 0.02–0.1)
PROT SERPL-MCNC: 4.4 G/DL — LOW (ref 6–8.3)
PROT SERPL-MCNC: 5.3 G/DL — LOW (ref 6–8.3)
PROT UR-MCNC: ABNORMAL
PROTHROM AB SERPL-ACNC: 15 SEC — HIGH (ref 10.5–13.4)
PROTHROM AB SERPL-ACNC: 15.7 SEC — HIGH (ref 10.5–13.4)
PROTHROM AB SERPL-ACNC: 17.7 SEC — HIGH (ref 10.5–13.4)
RAPIDTEG MAXIMUM AMPLITUDE: 61.9 MM — SIGNIFICANT CHANGE UP (ref 52–70)
RBC # BLD: 2.36 M/UL — LOW (ref 4.2–5.8)
RBC # BLD: 2.38 M/UL — LOW (ref 4.2–5.8)
RBC # BLD: 2.38 M/UL — LOW (ref 4.2–5.8)
RBC # BLD: 2.48 M/UL — LOW (ref 4.2–5.8)
RBC # BLD: 2.49 M/UL — LOW (ref 4.2–5.8)
RBC # BLD: 2.56 M/UL — LOW (ref 4.2–5.8)
RBC # BLD: 2.57 M/UL — LOW (ref 4.2–5.8)
RBC # BLD: 2.58 M/UL — LOW (ref 4.2–5.8)
RBC # BLD: 2.78 M/UL — LOW (ref 4.2–5.8)
RBC # BLD: 2.78 M/UL — LOW (ref 4.2–5.8)
RBC # BLD: 2.82 M/UL — LOW (ref 4.2–5.8)
RBC # BLD: 2.84 M/UL — LOW (ref 4.2–5.8)
RBC # BLD: 2.99 M/UL — LOW (ref 4.2–5.8)
RBC # BLD: 3.01 M/UL — LOW (ref 4.2–5.8)
RBC # BLD: 3.05 M/UL — LOW (ref 4.2–5.8)
RBC # FLD: 17 % — HIGH (ref 10.3–14.5)
RBC # FLD: 17.3 % — HIGH (ref 10.3–14.5)
RBC # FLD: 17.4 % — HIGH (ref 10.3–14.5)
RBC # FLD: 17.4 % — HIGH (ref 10.3–14.5)
RBC # FLD: 17.5 % — HIGH (ref 10.3–14.5)
RBC # FLD: 17.6 % — HIGH (ref 10.3–14.5)
RBC # FLD: 17.6 % — HIGH (ref 10.3–14.5)
RBC # FLD: 17.8 % — HIGH (ref 10.3–14.5)
RBC # FLD: 17.9 % — HIGH (ref 10.3–14.5)
RBC # FLD: 17.9 % — HIGH (ref 10.3–14.5)
RBC # FLD: 18.1 % — HIGH (ref 10.3–14.5)
RH IG SCN BLD-IMP: POSITIVE — SIGNIFICANT CHANGE UP
RH IG SCN BLD-IMP: POSITIVE — SIGNIFICANT CHANGE UP
RSV RNA NPH QL NAA+NON-PROBE: SIGNIFICANT CHANGE UP
S AUREUS DNA NOSE QL NAA+PROBE: DETECTED
SAO2 % BLDV: 66.8 % — LOW (ref 67–88)
SAO2 % BLDV: 69.1 % — SIGNIFICANT CHANGE UP (ref 67–88)
SAO2 % BLDV: 70.7 % — SIGNIFICANT CHANGE UP (ref 67–88)
SAO2 % BLDV: 71.6 % — SIGNIFICANT CHANGE UP (ref 67–88)
SAO2 % BLDV: 80 % — SIGNIFICANT CHANGE UP (ref 67–88)
SARS-COV-2 RNA SPEC QL NAA+PROBE: DETECTED
SODIUM SERPL-SCNC: 135 MMOL/L — SIGNIFICANT CHANGE UP (ref 135–145)
SODIUM SERPL-SCNC: 136 MMOL/L — SIGNIFICANT CHANGE UP (ref 135–145)
SODIUM SERPL-SCNC: 137 MMOL/L — SIGNIFICANT CHANGE UP (ref 135–145)
SODIUM SERPL-SCNC: 138 MMOL/L — SIGNIFICANT CHANGE UP (ref 135–145)
SODIUM SERPL-SCNC: 139 MMOL/L — SIGNIFICANT CHANGE UP (ref 135–145)
SODIUM SERPL-SCNC: 139 MMOL/L — SIGNIFICANT CHANGE UP (ref 135–145)
SODIUM SERPL-SCNC: 140 MMOL/L — SIGNIFICANT CHANGE UP (ref 135–145)
SP GR SPEC: 1.02 — SIGNIFICANT CHANGE UP (ref 1.01–1.02)
SPECIMEN SOURCE: SIGNIFICANT CHANGE UP
T3 SERPL-MCNC: 58 NG/DL — LOW (ref 80–200)
T4 AB SER-ACNC: 4.4 UG/DL — LOW (ref 4.6–12)
TEG FUNCTIONAL FIBRINOGEN: 20.8 MM — SIGNIFICANT CHANGE UP (ref 15–32)
TEG LY30 (LYSIS): 0 % — SIGNIFICANT CHANGE UP (ref 0–2.6)
TEG REACTION TIME: 5.5 MIN — SIGNIFICANT CHANGE UP (ref 4.6–9.1)
TSH SERPL-MCNC: 1.22 UIU/ML — SIGNIFICANT CHANGE UP (ref 0.27–4.2)
UROBILINOGEN FLD QL: NEGATIVE — SIGNIFICANT CHANGE UP
VANCOMYCIN FLD-MCNC: 15.9 UG/ML — SIGNIFICANT CHANGE UP
WBC # BLD: 10.11 K/UL — SIGNIFICANT CHANGE UP (ref 3.8–10.5)
WBC # BLD: 10.81 K/UL — HIGH (ref 3.8–10.5)
WBC # BLD: 10.83 K/UL — HIGH (ref 3.8–10.5)
WBC # BLD: 10.85 K/UL — HIGH (ref 3.8–10.5)
WBC # BLD: 11.12 K/UL — HIGH (ref 3.8–10.5)
WBC # BLD: 11.21 K/UL — HIGH (ref 3.8–10.5)
WBC # BLD: 11.44 K/UL — HIGH (ref 3.8–10.5)
WBC # BLD: 11.53 K/UL — HIGH (ref 3.8–10.5)
WBC # BLD: 11.79 K/UL — HIGH (ref 3.8–10.5)
WBC # BLD: 11.98 K/UL — HIGH (ref 3.8–10.5)
WBC # BLD: 12.86 K/UL — HIGH (ref 3.8–10.5)
WBC # BLD: 12.91 K/UL — HIGH (ref 3.8–10.5)
WBC # BLD: 13.51 K/UL — HIGH (ref 3.8–10.5)
WBC # BLD: 13.53 K/UL — HIGH (ref 3.8–10.5)
WBC # BLD: 8.79 K/UL — SIGNIFICANT CHANGE UP (ref 3.8–10.5)
WBC # FLD AUTO: 10.11 K/UL — SIGNIFICANT CHANGE UP (ref 3.8–10.5)
WBC # FLD AUTO: 10.81 K/UL — HIGH (ref 3.8–10.5)
WBC # FLD AUTO: 10.83 K/UL — HIGH (ref 3.8–10.5)
WBC # FLD AUTO: 10.85 K/UL — HIGH (ref 3.8–10.5)
WBC # FLD AUTO: 11.12 K/UL — HIGH (ref 3.8–10.5)
WBC # FLD AUTO: 11.21 K/UL — HIGH (ref 3.8–10.5)
WBC # FLD AUTO: 11.44 K/UL — HIGH (ref 3.8–10.5)
WBC # FLD AUTO: 11.53 K/UL — HIGH (ref 3.8–10.5)
WBC # FLD AUTO: 11.79 K/UL — HIGH (ref 3.8–10.5)
WBC # FLD AUTO: 11.98 K/UL — HIGH (ref 3.8–10.5)
WBC # FLD AUTO: 12.86 K/UL — HIGH (ref 3.8–10.5)
WBC # FLD AUTO: 12.91 K/UL — HIGH (ref 3.8–10.5)
WBC # FLD AUTO: 13.51 K/UL — HIGH (ref 3.8–10.5)
WBC # FLD AUTO: 13.53 K/UL — HIGH (ref 3.8–10.5)
WBC # FLD AUTO: 8.79 K/UL — SIGNIFICANT CHANGE UP (ref 3.8–10.5)

## 2022-01-01 PROCEDURE — 70486 CT MAXILLOFACIAL W/O DYE: CPT | Mod: 26

## 2022-01-01 PROCEDURE — 36556 INSERT NON-TUNNEL CV CATH: CPT

## 2022-01-01 PROCEDURE — 99291 CRITICAL CARE FIRST HOUR: CPT | Mod: 25

## 2022-01-01 PROCEDURE — 74176 CT ABD & PELVIS W/O CONTRAST: CPT | Mod: 26

## 2022-01-01 PROCEDURE — 99233 SBSQ HOSP IP/OBS HIGH 50: CPT

## 2022-01-01 PROCEDURE — 93280 PM DEVICE PROGR EVAL DUAL: CPT | Mod: 26

## 2022-01-01 PROCEDURE — 76377 3D RENDER W/INTRP POSTPROCES: CPT | Mod: 26

## 2022-01-01 PROCEDURE — 71045 X-RAY EXAM CHEST 1 VIEW: CPT | Mod: 26

## 2022-01-01 PROCEDURE — 99291 CRITICAL CARE FIRST HOUR: CPT | Mod: CS,GC

## 2022-01-01 PROCEDURE — 95720 EEG PHY/QHP EA INCR W/VEEG: CPT

## 2022-01-01 PROCEDURE — 99292 CRITICAL CARE ADDL 30 MIN: CPT | Mod: CS

## 2022-01-01 PROCEDURE — 70450 CT HEAD/BRAIN W/O DYE: CPT | Mod: 26

## 2022-01-01 PROCEDURE — 93308 TTE F-UP OR LMTD: CPT | Mod: 26

## 2022-01-01 PROCEDURE — 93321 DOPPLER ECHO F-UP/LMTD STD: CPT | Mod: 26

## 2022-01-01 PROCEDURE — 76937 US GUIDE VASCULAR ACCESS: CPT | Mod: 26

## 2022-01-01 PROCEDURE — 99231 SBSQ HOSP IP/OBS SF/LOW 25: CPT | Mod: GC

## 2022-01-01 PROCEDURE — 71250 CT THORAX DX C-: CPT | Mod: 26

## 2022-01-01 PROCEDURE — 72125 CT NECK SPINE W/O DYE: CPT | Mod: 26

## 2022-01-01 PROCEDURE — 72170 X-RAY EXAM OF PELVIS: CPT | Mod: 26

## 2022-01-01 RX ORDER — ACETAMINOPHEN 500 MG
1000 TABLET ORAL ONCE
Refills: 0 | Status: DISCONTINUED | OUTPATIENT
Start: 2022-01-01 | End: 2022-01-01

## 2022-01-01 RX ORDER — SODIUM CHLORIDE 9 MG/ML
10 INJECTION INTRAMUSCULAR; INTRAVENOUS; SUBCUTANEOUS
Refills: 0 | Status: DISCONTINUED | OUTPATIENT
Start: 2022-01-01 | End: 2022-01-01

## 2022-01-01 RX ORDER — CHLORHEXIDINE GLUCONATE 213 G/1000ML
1 SOLUTION TOPICAL
Refills: 0 | Status: DISCONTINUED | OUTPATIENT
Start: 2022-01-01 | End: 2023-01-01

## 2022-01-01 RX ORDER — MIDAZOLAM HYDROCHLORIDE 1 MG/ML
1 INJECTION, SOLUTION INTRAMUSCULAR; INTRAVENOUS ONCE
Refills: 0 | Status: DISCONTINUED | OUTPATIENT
Start: 2022-01-01 | End: 2022-01-01

## 2022-01-01 RX ORDER — HYDROMORPHONE HYDROCHLORIDE 2 MG/ML
0.25 INJECTION INTRAMUSCULAR; INTRAVENOUS; SUBCUTANEOUS EVERY 4 HOURS
Refills: 0 | Status: DISCONTINUED | OUTPATIENT
Start: 2022-01-01 | End: 2023-01-01

## 2022-01-01 RX ORDER — PIPERACILLIN AND TAZOBACTAM 4; .5 G/20ML; G/20ML
3.38 INJECTION, POWDER, LYOPHILIZED, FOR SOLUTION INTRAVENOUS ONCE
Refills: 0 | Status: COMPLETED | OUTPATIENT
Start: 2022-01-01 | End: 2022-01-01

## 2022-01-01 RX ORDER — CALCIUM GLUCONATE 100 MG/ML
2 VIAL (ML) INTRAVENOUS ONCE
Refills: 0 | Status: COMPLETED | OUTPATIENT
Start: 2022-01-01 | End: 2022-01-01

## 2022-01-01 RX ORDER — ROCURONIUM BROMIDE 10 MG/ML
50 VIAL (ML) INTRAVENOUS ONCE
Refills: 0 | Status: COMPLETED | OUTPATIENT
Start: 2022-01-01 | End: 2022-01-01

## 2022-01-01 RX ORDER — VANCOMYCIN HCL 1 G
750 VIAL (EA) INTRAVENOUS ONCE
Refills: 0 | Status: COMPLETED | OUTPATIENT
Start: 2022-01-01 | End: 2022-01-01

## 2022-01-01 RX ORDER — METOPROLOL TARTRATE 50 MG
1 TABLET ORAL
Qty: 0 | Refills: 0 | DISCHARGE

## 2022-01-01 RX ORDER — BACITRACIN ZINC 500 UNIT/G
1 OINTMENT IN PACKET (EA) TOPICAL
Refills: 0 | Status: DISCONTINUED | OUTPATIENT
Start: 2022-01-01 | End: 2023-01-01

## 2022-01-01 RX ORDER — POTASSIUM PHOSPHATE, MONOBASIC POTASSIUM PHOSPHATE, DIBASIC 236; 224 MG/ML; MG/ML
15 INJECTION, SOLUTION INTRAVENOUS ONCE
Refills: 0 | Status: COMPLETED | OUTPATIENT
Start: 2022-01-01 | End: 2022-01-01

## 2022-01-01 RX ORDER — ACETAMINOPHEN 500 MG
1000 TABLET ORAL EVERY 6 HOURS
Refills: 0 | Status: COMPLETED | OUTPATIENT
Start: 2022-01-01 | End: 2022-01-01

## 2022-01-01 RX ORDER — HEPARIN SODIUM 5000 [USP'U]/ML
5000 INJECTION INTRAVENOUS; SUBCUTANEOUS EVERY 8 HOURS
Refills: 0 | Status: DISCONTINUED | OUTPATIENT
Start: 2022-01-01 | End: 2022-01-01

## 2022-01-01 RX ORDER — CHLORHEXIDINE GLUCONATE 213 G/1000ML
15 SOLUTION TOPICAL EVERY 12 HOURS
Refills: 0 | Status: DISCONTINUED | OUTPATIENT
Start: 2022-01-01 | End: 2023-01-01

## 2022-01-01 RX ORDER — MEROPENEM 1 G/30ML
500 INJECTION INTRAVENOUS EVERY 12 HOURS
Refills: 0 | Status: DISCONTINUED | OUTPATIENT
Start: 2022-01-01 | End: 2023-01-01

## 2022-01-01 RX ORDER — MAGNESIUM SULFATE 500 MG/ML
2 VIAL (ML) INJECTION ONCE
Refills: 0 | Status: COMPLETED | OUTPATIENT
Start: 2022-01-01 | End: 2022-01-01

## 2022-01-01 RX ORDER — DOBUTAMINE HCL 250MG/20ML
2.5 VIAL (ML) INTRAVENOUS
Qty: 500 | Refills: 0 | Status: DISCONTINUED | OUTPATIENT
Start: 2022-01-01 | End: 2023-01-01

## 2022-01-01 RX ORDER — SODIUM CHLORIDE 9 MG/ML
250 INJECTION INTRAMUSCULAR; INTRAVENOUS; SUBCUTANEOUS ONCE
Refills: 0 | Status: COMPLETED | OUTPATIENT
Start: 2022-01-01 | End: 2022-01-01

## 2022-01-01 RX ORDER — NOREPINEPHRINE BITARTRATE/D5W 8 MG/250ML
0.19 PLASTIC BAG, INJECTION (ML) INTRAVENOUS
Qty: 8 | Refills: 0 | Status: DISCONTINUED | OUTPATIENT
Start: 2022-01-01 | End: 2022-01-01

## 2022-01-01 RX ORDER — ACETAMINOPHEN 500 MG
750 TABLET ORAL EVERY 6 HOURS
Refills: 0 | Status: COMPLETED | OUTPATIENT
Start: 2022-01-01 | End: 2022-01-01

## 2022-01-01 RX ORDER — INSULIN LISPRO 100/ML
VIAL (ML) SUBCUTANEOUS EVERY 6 HOURS
Refills: 0 | Status: DISCONTINUED | OUTPATIENT
Start: 2022-01-01 | End: 2023-01-01

## 2022-01-01 RX ORDER — ASPIRIN/CALCIUM CARB/MAGNESIUM 324 MG
1 TABLET ORAL
Qty: 0 | Refills: 0 | DISCHARGE

## 2022-01-01 RX ORDER — FENTANYL CITRATE 50 UG/ML
50 INJECTION INTRAVENOUS ONCE
Refills: 0 | Status: DISCONTINUED | OUTPATIENT
Start: 2022-01-01 | End: 2022-01-01

## 2022-01-01 RX ORDER — FUROSEMIDE 40 MG
1 TABLET ORAL
Qty: 0 | Refills: 0 | DISCHARGE

## 2022-01-01 RX ORDER — HYDROMORPHONE HYDROCHLORIDE 2 MG/ML
0.5 INJECTION INTRAMUSCULAR; INTRAVENOUS; SUBCUTANEOUS ONCE
Refills: 0 | Status: DISCONTINUED | OUTPATIENT
Start: 2022-01-01 | End: 2022-01-01

## 2022-01-01 RX ORDER — LIDOCAINE HYDROCHLORIDE AND EPINEPHRINE 10; 10 MG/ML; UG/ML
30 INJECTION, SOLUTION INFILTRATION; PERINEURAL ONCE
Refills: 0 | Status: DISCONTINUED | OUTPATIENT
Start: 2022-01-01 | End: 2022-01-01

## 2022-01-01 RX ORDER — SODIUM CHLORIDE 9 MG/ML
1000 INJECTION INTRAMUSCULAR; INTRAVENOUS; SUBCUTANEOUS ONCE
Refills: 0 | Status: COMPLETED | OUTPATIENT
Start: 2022-01-01 | End: 2022-01-01

## 2022-01-01 RX ORDER — VANCOMYCIN HCL 1 G
1000 VIAL (EA) INTRAVENOUS ONCE
Refills: 0 | Status: COMPLETED | OUTPATIENT
Start: 2022-01-01 | End: 2022-01-01

## 2022-01-01 RX ORDER — NOREPINEPHRINE BITARTRATE/D5W 8 MG/250ML
0.19 PLASTIC BAG, INJECTION (ML) INTRAVENOUS
Qty: 8 | Refills: 0 | Status: DISCONTINUED | OUTPATIENT
Start: 2022-01-01 | End: 2023-01-01

## 2022-01-01 RX ORDER — NOREPINEPHRINE BITARTRATE/D5W 8 MG/250ML
0.05 PLASTIC BAG, INJECTION (ML) INTRAVENOUS
Qty: 8 | Refills: 0 | Status: DISCONTINUED | OUTPATIENT
Start: 2022-01-01 | End: 2022-01-01

## 2022-01-01 RX ORDER — SODIUM BICARBONATE 1 MEQ/ML
0.07 SYRINGE (ML) INTRAVENOUS
Qty: 150 | Refills: 0 | Status: DISCONTINUED | OUTPATIENT
Start: 2022-01-01 | End: 2023-01-01

## 2022-01-01 RX ORDER — FUROSEMIDE 40 MG
40 TABLET ORAL ONCE
Refills: 0 | Status: COMPLETED | OUTPATIENT
Start: 2022-01-01 | End: 2022-01-01

## 2022-01-01 RX ORDER — CHLORHEXIDINE GLUCONATE 213 G/1000ML
1 SOLUTION TOPICAL
Refills: 0 | Status: DISCONTINUED | OUTPATIENT
Start: 2022-01-01 | End: 2022-01-01

## 2022-01-01 RX ORDER — ENOXAPARIN SODIUM 100 MG/ML
30 INJECTION SUBCUTANEOUS EVERY 24 HOURS
Refills: 0 | Status: DISCONTINUED | OUTPATIENT
Start: 2022-01-01 | End: 2023-01-01

## 2022-01-01 RX ORDER — SODIUM CHLORIDE 9 MG/ML
1000 INJECTION INTRAMUSCULAR; INTRAVENOUS; SUBCUTANEOUS
Refills: 0 | Status: DISCONTINUED | OUTPATIENT
Start: 2022-01-01 | End: 2022-01-01

## 2022-01-01 RX ORDER — DEXMEDETOMIDINE HYDROCHLORIDE IN 0.9% SODIUM CHLORIDE 4 UG/ML
0.5 INJECTION INTRAVENOUS
Qty: 200 | Refills: 0 | Status: DISCONTINUED | OUTPATIENT
Start: 2022-01-01 | End: 2022-01-01

## 2022-01-01 RX ORDER — VASOPRESSIN 20 [USP'U]/ML
0.03 INJECTION INTRAVENOUS
Qty: 40 | Refills: 0 | Status: DISCONTINUED | OUTPATIENT
Start: 2022-01-01 | End: 2022-01-01

## 2022-01-01 RX ORDER — DEXMEDETOMIDINE HYDROCHLORIDE IN 0.9% SODIUM CHLORIDE 4 UG/ML
0.2 INJECTION INTRAVENOUS
Qty: 200 | Refills: 0 | Status: DISCONTINUED | OUTPATIENT
Start: 2022-01-01 | End: 2022-01-01

## 2022-01-01 RX ORDER — PANTOPRAZOLE SODIUM 20 MG/1
40 TABLET, DELAYED RELEASE ORAL DAILY
Refills: 0 | Status: DISCONTINUED | OUTPATIENT
Start: 2022-01-01 | End: 2023-01-01

## 2022-01-01 RX ORDER — SODIUM CHLORIDE 0.65 %
1 AEROSOL, SPRAY (ML) NASAL DAILY
Refills: 0 | Status: DISCONTINUED | OUTPATIENT
Start: 2022-01-01 | End: 2023-01-01

## 2022-01-01 RX ADMIN — Medication 21.4 MICROGRAM(S)/KG/MIN: at 15:07

## 2022-01-01 RX ADMIN — Medication 250 MILLIGRAM(S): at 03:13

## 2022-01-01 RX ADMIN — Medication 21.4 MICROGRAM(S)/KG/MIN: at 14:11

## 2022-01-01 RX ADMIN — MIDAZOLAM HYDROCHLORIDE 1 MILLIGRAM(S): 1 INJECTION, SOLUTION INTRAMUSCULAR; INTRAVENOUS at 08:06

## 2022-01-01 RX ADMIN — DEXMEDETOMIDINE HYDROCHLORIDE IN 0.9% SODIUM CHLORIDE 3 MICROGRAM(S)/KG/HR: 4 INJECTION INTRAVENOUS at 07:10

## 2022-01-01 RX ADMIN — Medication 200 GRAM(S): at 11:10

## 2022-01-01 RX ADMIN — Medication 1 DROP(S): at 05:08

## 2022-01-01 RX ADMIN — ENOXAPARIN SODIUM 30 MILLIGRAM(S): 100 INJECTION SUBCUTANEOUS at 12:36

## 2022-01-01 RX ADMIN — Medication 1 DROP(S): at 17:21

## 2022-01-01 RX ADMIN — HYDROMORPHONE HYDROCHLORIDE 0.25 MILLIGRAM(S): 2 INJECTION INTRAMUSCULAR; INTRAVENOUS; SUBCUTANEOUS at 12:58

## 2022-01-01 RX ADMIN — Medication 1: at 17:28

## 2022-01-01 RX ADMIN — Medication 1 SPRAY(S): at 11:22

## 2022-01-01 RX ADMIN — Medication 1 APPLICATION(S): at 05:18

## 2022-01-01 RX ADMIN — Medication 30 MEQ/KG/HR: at 13:15

## 2022-01-01 RX ADMIN — PIPERACILLIN AND TAZOBACTAM 200 GRAM(S): 4; .5 INJECTION, POWDER, LYOPHILIZED, FOR SOLUTION INTRAVENOUS at 07:30

## 2022-01-01 RX ADMIN — HYDROMORPHONE HYDROCHLORIDE 0.25 MILLIGRAM(S): 2 INJECTION INTRAMUSCULAR; INTRAVENOUS; SUBCUTANEOUS at 09:26

## 2022-01-01 RX ADMIN — Medication 1 DROP(S): at 11:10

## 2022-01-01 RX ADMIN — Medication 21.4 MICROGRAM(S)/KG/MIN: at 19:27

## 2022-01-01 RX ADMIN — Medication 4.5 MICROGRAM(S)/KG/MIN: at 13:30

## 2022-01-01 RX ADMIN — CHLORHEXIDINE GLUCONATE 15 MILLILITER(S): 213 SOLUTION TOPICAL at 17:01

## 2022-01-01 RX ADMIN — Medication 1 DROP(S): at 12:37

## 2022-01-01 RX ADMIN — Medication 1 DROP(S): at 05:18

## 2022-01-01 RX ADMIN — PANTOPRAZOLE SODIUM 40 MILLIGRAM(S): 20 TABLET, DELAYED RELEASE ORAL at 11:08

## 2022-01-01 RX ADMIN — Medication 400 MILLIGRAM(S): at 17:20

## 2022-01-01 RX ADMIN — Medication 1 APPLICATION(S): at 17:20

## 2022-01-01 RX ADMIN — Medication 400 MILLIGRAM(S): at 23:00

## 2022-01-01 RX ADMIN — VASOPRESSIN 4.5 UNIT(S)/MIN: 20 INJECTION INTRAVENOUS at 07:10

## 2022-01-01 RX ADMIN — SODIUM CHLORIDE 250 MILLILITER(S): 9 INJECTION INTRAMUSCULAR; INTRAVENOUS; SUBCUTANEOUS at 08:05

## 2022-01-01 RX ADMIN — Medication 1 SPRAY(S): at 12:36

## 2022-01-01 RX ADMIN — SODIUM CHLORIDE 75 MILLILITER(S): 9 INJECTION INTRAMUSCULAR; INTRAVENOUS; SUBCUTANEOUS at 17:05

## 2022-01-01 RX ADMIN — HYDROMORPHONE HYDROCHLORIDE 0.25 MILLIGRAM(S): 2 INJECTION INTRAMUSCULAR; INTRAVENOUS; SUBCUTANEOUS at 23:00

## 2022-01-01 RX ADMIN — PANTOPRAZOLE SODIUM 40 MILLIGRAM(S): 20 TABLET, DELAYED RELEASE ORAL at 12:35

## 2022-01-01 RX ADMIN — Medication 1 DROP(S): at 17:02

## 2022-01-01 RX ADMIN — MEROPENEM 100 MILLIGRAM(S): 1 INJECTION INTRAVENOUS at 14:45

## 2022-01-01 RX ADMIN — Medication 30 MEQ/KG/HR: at 19:15

## 2022-01-01 RX ADMIN — PANTOPRAZOLE SODIUM 40 MILLIGRAM(S): 20 TABLET, DELAYED RELEASE ORAL at 11:09

## 2022-01-01 RX ADMIN — VASOPRESSIN 4.5 UNIT(S)/MIN: 20 INJECTION INTRAVENOUS at 17:05

## 2022-01-01 RX ADMIN — MEROPENEM 100 MILLIGRAM(S): 1 INJECTION INTRAVENOUS at 14:03

## 2022-01-01 RX ADMIN — Medication 21.4 MICROGRAM(S)/KG/MIN: at 17:04

## 2022-01-01 RX ADMIN — VASOPRESSIN 4.5 UNIT(S)/MIN: 20 INJECTION INTRAVENOUS at 13:58

## 2022-01-01 RX ADMIN — Medication 1 DROP(S): at 17:05

## 2022-01-01 RX ADMIN — HEPARIN SODIUM 5000 UNIT(S): 5000 INJECTION INTRAVENOUS; SUBCUTANEOUS at 05:09

## 2022-01-01 RX ADMIN — MEROPENEM 100 MILLIGRAM(S): 1 INJECTION INTRAVENOUS at 01:05

## 2022-01-01 RX ADMIN — CHLORHEXIDINE GLUCONATE 15 MILLILITER(S): 213 SOLUTION TOPICAL at 17:20

## 2022-01-01 RX ADMIN — HYDROMORPHONE HYDROCHLORIDE 0.5 MILLIGRAM(S): 2 INJECTION INTRAMUSCULAR; INTRAVENOUS; SUBCUTANEOUS at 18:12

## 2022-01-01 RX ADMIN — MEROPENEM 100 MILLIGRAM(S): 1 INJECTION INTRAVENOUS at 01:35

## 2022-01-01 RX ADMIN — Medication 400 MILLIGRAM(S): at 11:07

## 2022-01-01 RX ADMIN — Medication 50 MEQ/KG/HR: at 07:41

## 2022-01-01 RX ADMIN — CHLORHEXIDINE GLUCONATE 1 APPLICATION(S): 213 SOLUTION TOPICAL at 12:08

## 2022-01-01 RX ADMIN — Medication 25 GRAM(S): at 17:15

## 2022-01-01 RX ADMIN — Medication 250 MILLIGRAM(S): at 08:01

## 2022-01-01 RX ADMIN — CHLORHEXIDINE GLUCONATE 15 MILLILITER(S): 213 SOLUTION TOPICAL at 05:07

## 2022-01-01 RX ADMIN — HYDROMORPHONE HYDROCHLORIDE 0.25 MILLIGRAM(S): 2 INJECTION INTRAMUSCULAR; INTRAVENOUS; SUBCUTANEOUS at 10:03

## 2022-01-01 RX ADMIN — POTASSIUM PHOSPHATE, MONOBASIC POTASSIUM PHOSPHATE, DIBASIC 62.5 MILLIMOLE(S): 236; 224 INJECTION, SOLUTION INTRAVENOUS at 17:27

## 2022-01-01 RX ADMIN — CHLORHEXIDINE GLUCONATE 1 APPLICATION(S): 213 SOLUTION TOPICAL at 05:07

## 2022-01-01 RX ADMIN — Medication 300 MILLIGRAM(S): at 12:08

## 2022-01-01 RX ADMIN — Medication 200 GRAM(S): at 12:36

## 2022-01-01 RX ADMIN — Medication 4.5 MICROGRAM(S)/KG/MIN: at 19:15

## 2022-01-01 RX ADMIN — Medication 4.5 MICROGRAM(S)/KG/MIN: at 07:41

## 2022-01-01 RX ADMIN — Medication 1000 MILLIGRAM(S): at 07:30

## 2022-01-01 RX ADMIN — VASOPRESSIN 4.5 UNIT(S)/MIN: 20 INJECTION INTRAVENOUS at 19:28

## 2022-01-01 RX ADMIN — Medication 300 MILLIGRAM(S): at 17:04

## 2022-01-01 RX ADMIN — Medication 1 DROP(S): at 11:08

## 2022-01-01 RX ADMIN — HYDROMORPHONE HYDROCHLORIDE 0.25 MILLIGRAM(S): 2 INJECTION INTRAMUSCULAR; INTRAVENOUS; SUBCUTANEOUS at 17:15

## 2022-01-01 RX ADMIN — Medication 50 MEQ/KG/HR: at 19:27

## 2022-01-01 RX ADMIN — Medication 21.4 MICROGRAM(S)/KG/MIN: at 19:15

## 2022-01-01 RX ADMIN — HYDROMORPHONE HYDROCHLORIDE 0.25 MILLIGRAM(S): 2 INJECTION INTRAMUSCULAR; INTRAVENOUS; SUBCUTANEOUS at 09:33

## 2022-01-01 RX ADMIN — HYDROMORPHONE HYDROCHLORIDE 0.5 MILLIGRAM(S): 2 INJECTION INTRAMUSCULAR; INTRAVENOUS; SUBCUTANEOUS at 17:42

## 2022-01-01 RX ADMIN — CHLORHEXIDINE GLUCONATE 1 APPLICATION(S): 213 SOLUTION TOPICAL at 05:17

## 2022-01-01 RX ADMIN — SODIUM CHLORIDE 1000 MILLILITER(S): 9 INJECTION INTRAMUSCULAR; INTRAVENOUS; SUBCUTANEOUS at 08:05

## 2022-01-01 RX ADMIN — MEROPENEM 100 MILLIGRAM(S): 1 INJECTION INTRAVENOUS at 14:52

## 2022-01-01 RX ADMIN — Medication 300 MILLIGRAM(S): at 23:01

## 2022-01-01 RX ADMIN — MIDAZOLAM HYDROCHLORIDE 1 MILLIGRAM(S): 1 INJECTION, SOLUTION INTRAMUSCULAR; INTRAVENOUS at 15:45

## 2022-01-01 RX ADMIN — Medication 40 MILLIGRAM(S): at 09:27

## 2022-01-01 RX ADMIN — Medication 750 MILLIGRAM(S): at 17:34

## 2022-01-01 RX ADMIN — Medication 250 MILLIGRAM(S): at 16:21

## 2022-01-01 RX ADMIN — FENTANYL CITRATE 50 MICROGRAM(S): 50 INJECTION INTRAVENOUS at 15:45

## 2022-01-01 RX ADMIN — CHLORHEXIDINE GLUCONATE 15 MILLILITER(S): 213 SOLUTION TOPICAL at 05:17

## 2022-01-01 RX ADMIN — HYDROMORPHONE HYDROCHLORIDE 0.25 MILLIGRAM(S): 2 INJECTION INTRAMUSCULAR; INTRAVENOUS; SUBCUTANEOUS at 07:30

## 2022-01-01 RX ADMIN — Medication 1 DROP(S): at 23:02

## 2022-01-01 RX ADMIN — Medication 4.5 MICROGRAM(S)/KG/MIN: at 19:27

## 2022-01-01 RX ADMIN — Medication 1: at 05:08

## 2022-01-01 RX ADMIN — PIPERACILLIN AND TAZOBACTAM 3.38 GRAM(S): 4; .5 INJECTION, POWDER, LYOPHILIZED, FOR SOLUTION INTRAVENOUS at 07:45

## 2022-01-01 RX ADMIN — SODIUM CHLORIDE 75 MILLILITER(S): 9 INJECTION INTRAMUSCULAR; INTRAVENOUS; SUBCUTANEOUS at 19:29

## 2022-01-01 RX ADMIN — Medication 300 MILLIGRAM(S): at 05:07

## 2022-01-01 RX ADMIN — Medication 1 APPLICATION(S): at 17:01

## 2022-01-01 RX ADMIN — SODIUM CHLORIDE 1000 MILLILITER(S): 9 INJECTION INTRAMUSCULAR; INTRAVENOUS; SUBCUTANEOUS at 07:02

## 2022-01-01 RX ADMIN — Medication 21.4 MICROGRAM(S)/KG/MIN: at 07:41

## 2022-01-01 RX ADMIN — DEXMEDETOMIDINE HYDROCHLORIDE IN 0.9% SODIUM CHLORIDE 3 MICROGRAM(S)/KG/HR: 4 INJECTION INTRAVENOUS at 05:32

## 2022-01-01 RX ADMIN — Medication 50 MILLIGRAM(S): at 08:07

## 2022-01-01 RX ADMIN — Medication 200 GRAM(S): at 17:42

## 2022-01-01 RX ADMIN — Medication 1 DROP(S): at 23:04

## 2022-01-01 RX ADMIN — Medication 21.4 MICROGRAM(S)/KG/MIN: at 07:10

## 2022-01-01 RX ADMIN — ENOXAPARIN SODIUM 30 MILLIGRAM(S): 100 INJECTION SUBCUTANEOUS at 11:08

## 2022-01-01 RX ADMIN — SODIUM CHLORIDE 75 MILLILITER(S): 9 INJECTION INTRAMUSCULAR; INTRAVENOUS; SUBCUTANEOUS at 07:10

## 2022-01-01 RX ADMIN — Medication 2: at 11:33

## 2022-01-01 RX ADMIN — HYDROMORPHONE HYDROCHLORIDE 0.25 MILLIGRAM(S): 2 INJECTION INTRAMUSCULAR; INTRAVENOUS; SUBCUTANEOUS at 22:28

## 2022-01-01 RX ADMIN — Medication 21.4 MICROGRAM(S)/KG/MIN: at 19:29

## 2022-01-01 RX ADMIN — CHLORHEXIDINE GLUCONATE 15 MILLILITER(S): 213 SOLUTION TOPICAL at 17:27

## 2022-01-01 RX ADMIN — Medication 1 DROP(S): at 23:01

## 2022-01-01 RX ADMIN — Medication 400 MILLIGRAM(S): at 05:18

## 2022-01-01 RX ADMIN — HYDROMORPHONE HYDROCHLORIDE 0.25 MILLIGRAM(S): 2 INJECTION INTRAMUSCULAR; INTRAVENOUS; SUBCUTANEOUS at 05:16

## 2022-01-01 RX ADMIN — HYDROMORPHONE HYDROCHLORIDE 0.25 MILLIGRAM(S): 2 INJECTION INTRAMUSCULAR; INTRAVENOUS; SUBCUTANEOUS at 16:45

## 2022-01-01 RX ADMIN — HEPARIN SODIUM 5000 UNIT(S): 5000 INJECTION INTRAVENOUS; SUBCUTANEOUS at 23:02

## 2022-01-01 RX ADMIN — SODIUM CHLORIDE 250 MILLILITER(S): 9 INJECTION INTRAMUSCULAR; INTRAVENOUS; SUBCUTANEOUS at 07:30

## 2022-08-25 NOTE — H&P ADULT - PROBLEM/PLAN-1
Bed: 16  Expected date:   Expected time:   Means of arrival:   Comments:  MG; Lower back pain \"pinching\" 68yof   DISPLAY PLAN FREE TEXT

## 2022-12-13 NOTE — PATIENT PROFILE ADULT - HOME ACCESSIBILITY CONCERNS
Impression: Type 2 diabetes mellitus with mild nonproliferative diabetic retinopathy with macular edema, left eye: [id number].
- Focal 05/17/22 OCT: 12/13/22 OD: wnl OS: inferior ME- improving Plan: The diagnosis and prognosis of diabetic macular edema, as well as the risks and benefits of various treatment options including focal/grid laser, steroids, Lucentis, Eylea and Avastin; along with the alternatives of observation or participation in a clinical trial, were discussed at length. The patient understands that treatment may not improve vision, but should reduce the risk of further visual loss. Given improvement of vision and exam, pt elects to proceed with observation RTC 6 months DFE OU OCT OU Re-eval Avastin stairs within home/stairs to enter home

## 2022-12-29 NOTE — PATIENT PROFILE ADULT - FALL HARM RISK - HARM RISK INTERVENTIONS

## 2022-12-29 NOTE — OCCUPATIONAL THERAPY INITIAL EVALUATION ADULT - PERTINENT HX OF CURRENT PROBLEM, REHAB EVAL
89yo M pmh Deaf at baseline, HTN/HLD, CAD s/p CABG, PPM, CKD 4, GIB - Presents emergency department as a level 1 trauma after found on the ground outside of the house by family.  As per son patient was diagnosed positive with COVID on Christmas Day, thereafter was very weak and confused however had a rebound in mental status and strength yesterday. this morning son went outside and found father lying on the ground at the bottom of tubes cement steps with facial trauma and bleeding.  Patient was not alert responsive at that time EMS called. Patient presented dyspneic as well likely secondary to hypothermia however must also consider intra cranial hemorrhage.  Primary survey with intact airway however patient intubated for airway protection in the setting of decreased mental status.CT HEAD: Large left anterior frontal scalp hematoma and swelling There is no acute intracranial hemorrhage or depressed calvarial fracture.CT CERVICAL SPINE: No fracture or acute traumatic malalignment. Ct chest- BONES: Left total hip arthroplasty. Multilevel degenerative changes throughout the spine. CT maxillofacial: Comminuted bilateral nasal bone fractures and fracture of the bony nasal septum. Large left periorbital swelling and hematoma.

## 2022-12-29 NOTE — PATIENT PROFILE ADULT - FUNCTIONAL ASSESSMENT - BASIC MOBILITY 6.
1-calculated by average/Not able to assess (calculate score using [facility] averaging method)

## 2022-12-29 NOTE — ED PROVIDER NOTE - CLINICAL SUMMARY MEDICAL DECISION MAKING FREE TEXT BOX
89-year-old male presenting from home as a level 1 trauma with facial trauma after unwitnessed fall.  Patient is on aspirin every second day.  As per EMS patient found outside hypothermic hypotensive.  Patient presented dyspneic as well likely secondary to hypothermia however must also consider intra cranial hemorrhage.  Primary survey with intact airway however patient intubated for airway protection in the setting of decreased mental status.  No gross deformities on extremities chest wall lower extremities.  Patient profoundly hypothermic aggressive warming measures taken with intravenous warmed fluids measured by Davis temperature probe.  Patient to be admitted to SICU taken to CT scan and route prior, contrast deferred in the setting of known CKD.

## 2022-12-29 NOTE — H&P ADULT - ATTENDING COMMENTS
89M ground-level fall, unknown LOC,  seen and examined upon arrival as level 1 trauma.    GCS initially = 9 (E3 V1 M5), although it was later learned that the patient is deaf.  no respiratory distress and breath sounds equal  hypotensive  not bradycardiac, but paced rhythm  severely hypothermic    his only evidence of trauma was left periorbital ecchymosis and some scattered abrasions  eFAST negative  our suspicion for hemorrhagic shock was very low, and we were highly concerned that hypotension was secondary to severe accidental hypothermia. given his degree of hypothermia, he was at risk for developing cardiac arrest and not safe for transport to CT scan or SICU.    we began rewarming with Medi-Therm blanket, Tam Hugger and crystalloid warmed on Level 1 infuser.  a right femoral a-line was placed using U/S guidance to monitor hemodynamics.    CXR - no pneumothorax, hemothorax or displaced rib fractures. biventricular PPM.  X-ray pelvis - no fractures. left ORESTES prothesis.    while rewarming, his GCS decline and he was intubated with RSI.  after temp > 82 deg F, we transported him to CT scan. the only injuries were bilateral nasal bone fractures.    He was admitted to SICU.      I reviewed his labs and radiologic images.  care coordinated with ER and SICU teams. 89M ground-level fall, unknown LOC,  seen and examined upon arrival as level 1 trauma.    GCS initially = 9 (E3 V1 M5), although it was later learned that the patient is deaf.  no respiratory distress and breath sounds equal  hypotensive  not bradycardiac, but paced rhythm  severely hypothermic    his only evidence of trauma was left periorbital ecchymosis and some scattered abrasions  eFAST negative  our suspicion for hemorrhagic shock was very low, and we were highly concerned that hypotension was secondary to severe accidental hypothermia. given his degree of hypothermia, he was at risk for developing cardiac arrest and not safe for transport to CT scan or SICU.    we began rewarming with Medi-Therm blanket, Tam Hugger and crystalloid warmed on Level 1 infuser.  a right femoral a-line was placed using U/S guidance to monitor hemodynamics.    CXR - no pneumothorax, hemothorax or displaced rib fractures. biventricular PPM.  X-ray pelvis - no fractures. left ORESTES prothesis.    while rewarming, his GCS decline and he was intubated with RSI.  after temp > 82 deg F, we transported him to CT scan. IV contrast was not given since he had CKD and was at high risk for hypothermia-induced LUCY, and our suspicion for significant torso injury was very low. the only injuries were bilateral nasal bone fractures.    He was admitted to SICU.      I reviewed his labs and radiologic images.  care coordinated with ER and SICU teams.

## 2022-12-29 NOTE — PHYSICAL THERAPY INITIAL EVALUATION ADULT - PERTINENT HX OF CURRENT PROBLEM, REHAB EVAL
89yo M pmh Deaf at baseline, HTN/HLD, CAD s/p CABG, PPM, CKD 4, GIB - Presents emergency department as a level 1 trauma after found on the ground outside of the house by family.  As per son patient was diagnosed positive with COVID on Christmas Day, thereafter was very weak and confused however had a rebound in mental status and strength yesterday. this morning son went outside and found father lying on the ground at the bottom of tubes cement steps with facial trauma and bleeding.  Patient was not alert responsive at that time EMS called. Patient presented dyspneic as well likely secondary to hypothermia however must also consider intra cranial hemorrhage.  Primary survey with intact airway however patient intubated for airway protection in the setting of decreased mental status.CT HEAD: Large left anterior frontal scalp hematoma and swelling There is   no acute intracranial hemorrhage or depressed calvarial fracture.CT CERVICAL SPINE: No fracture or acute traumatic malalignment. Ct chest- BONES: Left total hip arthroplasty. Multilevel degenerative changes throughout the spine. CT maxillofacial: Comminuted bilateral nasal bone fractures and fracture   of the bony nasal septum. Large left periorbital swelling and hematoma. 89yo M pmh Deaf at baseline, HTN/HLD, CAD s/p CABG, PPM, CKD 4, GIB - Presents emergency department as a level 1 trauma after found on the ground outside of the house by family.  As per son patient was diagnosed positive with COVID on Christmas Day, thereafter was very weak and confused however had a rebound in mental status and strength yesterday. this morning son went outside and found father lying on the ground at the bottom of tubes cement steps with facial trauma and bleeding.  Patient was not alert responsive at that time EMS called. Patient presented dyspneic as well likely secondary to hypothermia however must also consider intra cranial hemorrhage.  Primary survey with intact airway however patient intubated for airway protection in the setting of decreased mental status.CT HEAD: Large left anterior frontal scalp hematoma and swelling There is   no acute intracranial hemorrhage or depressed calvarial fracture.CT CERVICAL SPINE: No fracture or acute traumatic malalignment. Ct chest- BONES: Left total hip arthroplasty. Multilevel degenerative changes throughout the spine. CT maxillofacial: Comminuted bilateral nasal bone fractures and fracture   of the bony nasal septum. Large left periorbital swelling and hematoma. C Collar cleared. s/p extubation 1/2. 89yo M pmh Deaf at baseline, HTN/HLD, CAD s/p CABG, PPM, CKD 4, GIB - Presents emergency department as a level 1 trauma after found on the ground outside of the house by family.  As per son patient was diagnosed positive with COVID on Christmas Day, thereafter was very weak and confused however had a rebound in mental status and strength yesterday. this morning son went outside and found father lying on the ground at the bottom of tubes cement steps with facial trauma and bleeding.  Patient was not alert responsive at that time EMS called. Patient presented dyspneic as well likely secondary to hypothermia however must also consider intra cranial hemorrhage.  Primary survey with intact airway however patient intubated for airway protection in the setting of decreased mental status.CT HEAD: Large left anterior frontal scalp hematoma and swelling There is   no acute intracranial hemorrhage or depressed calvarial fracture.CT CERVICAL SPINE: No fracture or acute traumatic malalignment. Ct chest- BONES: Left total hip arthroplasty. Multilevel degenerative changes throughout the spine. CT maxillofacial: Comminuted bilateral nasal bone fractures and fracture   of the bony nasal septum. Large left periorbital swelling and hematoma. C Collar cleared. s/p extubation 1/2. CTH 1/2- No acute intracranial hemorrhage, mass effect, or midline shift. 89yo M pmh Deaf at baseline, HTN/HLD, CAD s/p CABG, PPM, CKD 4, GIB - Presents emergency department as a level 1 trauma after found on the ground outside of the house by family.  As per son patient was diagnosed positive with COVID on Christmas Day, thereafter was very weak and confused however had a rebound in mental status and strength yesterday. this morning son went outside and found father lying on the ground at the bottom of tubes cement steps with facial trauma and bleeding.  Patient was not alert responsive at that time EMS called. Patient presented dyspneic as well likely secondary to hypothermia however must also consider intra cranial hemorrhage.  Primary survey with intact airway however patient intubated for airway protection in the setting of decreased mental status.CT HEAD: Large left anterior frontal scalp hematoma and swelling There is   no acute intracranial hemorrhage or depressed calvarial fracture.CT CERVICAL SPINE: No fracture or acute traumatic malalignment. Ct chest- BONES: Left total hip arthroplasty. Multilevel degenerative changes throughout the spine. CT maxillofacial: Comminuted bilateral nasal bone fractures and fracture   of the bony nasal septum.Imaging revealed bilateral nasal bone and nasal septum fractures. S/P laceration repair 12/29. Pt admitted to SICU for shock, ?septic/hypothermic.  Large left periorbital swelling and hematoma. C Collar cleared. s/p extubation 1/2. CTH 1/2- No acute intracranial hemorrhage, mass effect, or midline shift.

## 2022-12-29 NOTE — PROCEDURE NOTE - ADDITIONAL PROCEDURE DETAILS
Sterile placement of RIJ triple lumen CVC with venous blood return in all lumens, flushed easily, sutured to skin and sterile dressing applied. Tolerated well.

## 2022-12-29 NOTE — CONSULT NOTE ADULT - NS ATTEND AMEND GEN_ALL_CORE FT
89 y/o male w/ a PMHx of CAD s/p triple vessel CABG, HTN, HLD, CKD stage IV, BPH, gout, and anemia who presented as a level 1 trauma after being found down outside. As per the son, patient went to bed around 2130 yesterday and was found down outside around 0600 w/ his walker. According to son he is normal A&Ox4 but had been intermittently lethargic & confused after he was found to be COVID-19 positive on 12/25.   Injuries include facial lac and fx. Pt hypothermic    Pt is very lethargic, CT head and c spine neg, monitor neuro  status off sedation. EEG  Acute resp failure post trauma. CT chest reviewed, has b/l effusion atelectasis possible infiltrate 89 y/o male w/ a PMHx of CAD s/p triple vessel CABG, HTN, HLD, CKD stage IV, BPH, gout, and anemia who presented as a level 1 trauma after being found down outside. As per the son, patient went to bed around 2130 yesterday and was found down outside around 0600 w/ his walker. According to son he is normal A&Ox4 but had been intermittently lethargic & confused after he was found to be COVID-19 positive on 12/25.   Injuries include facial lac and fx. Pt hypothermic    Pt is very lethargic, CT head and c spine neg, monitor neuro status off sedation. EEG ordered  Acute resp failure post trauma. CT chest reviewed, has b/l effusion atelectasis possible infiltrate    In septic shock with increasing vasopressor requirement. Central line placed. Will check SVO2, CT chest has some air bronchogram consistent with pna. {CT .46. Pt hypothermic on warming blankets. Pan cultures done including BAL. Will start Vanco and Meropenem. Vanco dose by level  Pt COVID +, not a candidate for Remdesivir sec to CKD  NPO, PPI  IVF, I/O, monitor CKD  ISS/HbA1C  DVT ppx with renally adjusted dose Lovenox on hold sec to profuse facil injury bleed. Will start ASAP once bleeding is secured. Plastic called

## 2022-12-29 NOTE — PROCEDURE NOTE - ADDITIONAL PROCEDURE DETAILS
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     AP 95.3%;  99.5%    Events: Multiple events of AFL, V rates controlled.   - Some events with undersensing of AFL waves w/ Indication: Fall yesterday    Presenting rhythm: AFL-VPaced    Underlying rhythm: AFL w/ CHB  Pt is pacemaker dependent.     AP 95.3%;  99.5%    Events: Multiple events of AFL, V rates controlled.   - Some events with undersensing of AFL waves w/ intermittent Atrial pacing. Pt is Vpaced ~60s during events.   - NO events to correlate with patient's fall.     Atrial sensitivity changed from 0.30 to 0.15mV d/t undersensing of flutter waves.   RV output increased to 3V at 0.4ms for adequate safety margin.     Discussed with primary team.     - MAE Cavazos

## 2022-12-29 NOTE — PROCEDURE NOTE - ADDITIONAL PROCEDURE DETAILS
Sites injected with 1% epi/lido and prepped/draped in sterile fashion.   L brow/forehead closed with 5-0 monocryl deep dermal sutures and 5-0 nylon simple interrupted sutures.  Nasal dorum and upper lip lacerations closed with 5-0 nylon interrupted sutures.  Patient tolerated procedures well.

## 2022-12-29 NOTE — OCCUPATIONAL THERAPY INITIAL EVALUATION ADULT - LIVES WITH, PROFILE
Patient continues to be tobacco free. Congratulated him for his success his is so proud about his accomplishment.  Patient is not using any lozenges at this time. Pt's exhaled carbon monoxide level was 2 ppm per smokerlyzer (0-6 ppm non-smoker). Pt will return to group sessions 1 week.
[place] lives [place] son in [place]

## 2022-12-29 NOTE — ED PROVIDER NOTE - OBJECTIVE STATEMENT
89yo M pmh Deaf at baseline, HTN/HLD, CAD s/p CABG, PPM, CKD 4, GIB - Presents emergency department as a level 1 trauma after found on the ground outside of the house by family.  As per son patient was diagnosed positive with COVID on Christmas Day, thereafter was very weak and confused however had a rebound in mental status and strength yesterday.  We went to sleep last night was normal, this morning son went outside and found father lying on the ground at the bottom of tubes cement steps with facial trauma and bleeding.  Patient was not alert responsive at that time EMS called.  Found by EMS hypotensive bradycardic and hypothermic minimally responsive.

## 2022-12-29 NOTE — PROCEDURE NOTE - NSSITEPREP_SKIN_A_CORE
chlorhexidine/Adherence to aseptic technique: hand hygiene prior to donning barriers (gown, gloves), don cap and mask, sterile drape over patient
povidone iodine
chlorhexidine

## 2022-12-29 NOTE — OCCUPATIONAL THERAPY INITIAL EVALUATION ADULT - NSOTDISCHREC_GEN_A_CORE
TBD pending functional evaluation If patient goes home, home OT and assist for ALL ADLs and mobility/Sub-acute Rehab

## 2022-12-29 NOTE — ED PROVIDER NOTE - NSICDXPASTMEDICALHX_GEN_ALL_CORE_FT
PAST MEDICAL HISTORY:  Anemia     BPH (benign prostatic hyperplasia)     CAD (coronary artery disease)     CKD (chronic kidney disease)     Gout     Hypercholesterolemia     Hypertension     S/P CABG x 3

## 2022-12-29 NOTE — CHART NOTE - NSCHARTNOTEFT_GEN_A_CORE
12 cc Lidocaine with 1% epi injected around area. Areas were prepped and draped bedside. Right brow lac was repaired with deep dermal 5-0 interruped monocryl sutures with 5-0 simple nylons superficially. Laceration on the nasal dorsum was repaired with 5-0 nylon sutures.

## 2022-12-29 NOTE — ED PROVIDER NOTE - ATTENDING CONTRIBUTION TO CARE
This is a 89-year-old male who came in by EMS.  Upon arrival to the emergency room the patient was not responsive hypotensive and hypothermic.  A level 1 trauma was called.  Please see the documentation from the trauma flowsheets for further details.  I was present during the entirety of the level 1 trauma.  Notably the patient is deaf and as per EMS report had been found on the ground of the [facility].  On arrival to the ER the patient was hypotensive.  The patient was groaning and was not moving to pain.  His eyes were not opening.  Decision was deferred regarding intubation.  After further aggressive resuscitation the patient had worsening mental status and gurgling a decision was made to intubate.  Pupils were 3 mm and sluggishly reactive there is bilateral breath sounds and no abdominal tenderness to palpation.  Patient was successfully intubated in the emergency room and was placed under warming blanket as soon as possible during the trauma evaluation.  Bladder catheter temperature-sensing probe was placed and the patient's temperature was approximately 80 and during the ER stay went up to nearly 90.  The patient was transported to CT scan with the trauma team and then directly to surgical ICU.  Given our concern for infection as the underlying cause empiric antibiotics were started.  Notably the family member told us that the patient had COVID-19 diagnosed on [place].  Furthermore at this time they wanted the patient to be full code.

## 2022-12-29 NOTE — H&P ADULT - HISTORY OF PRESENT ILLNESS
HPI:   89 year old male history of CAD, s/p CABG, PPM, HTN, CKD 4, HLD, gout, upper GI bleed 2/2022, recent COVID 12/25/22, presenting as a level 1 trauma activation after being found down on the ground in his backyard by his son. Son states he found his father next to his walker unresponsive. Patient arrived hypothermic to 78F, hypotensive to 60/40, responded to 110's/70's with warm IVF resuscitation, HR 70's, O2 100%, GCS 10 (4,2,4)    Primary Survey:   A - airway intact  B - bilateral breath sounds and good chest rise  C - initial BP  BP:  60/40 (12-29-22 @ 08:18), HR HR: 79 (12-29-22 @ 08:49) , palpable pulses in all extremities  D - GCS 10 on arrival  Exposure obtained      Secondary Survey:   General: Altered, elderly male  HEENT: Dry blood throughout scalp and face, 3cm full-thickness laceration above left eyebrow, L eye periorbital ecchymosis, L nare laceration, dry blood in both nares and on tongue, no active bleeding anywhere, EOMI, PEERL.  Neck: Soft, midline trachea.  Chest: No chest wall deformities.   Cardiac: S1, S2, RRR  Respiratory: Bilateral breath sounds, clear and equal bilaterally  Abdomen: Soft, non-distended, no masses palpated  Groin: Normal appearing  Ext: palp brachial b/l UE, b/l femoral palp, cannot assess motor and sensory function due to AMS  Back: no palpable stepoff/deformity  Rectal: No fe blood    eFAST negative  CXR and PXR in trauma bay unremarkable    ROS: 10-system review could not be obtained due to patient condition    PAST MEDICAL & SURGICAL HISTORY:  S/P CABG x 3      CKD (chronic kidney disease)      Anemia      Gout      BPH (benign prostatic hyperplasia)      Hypertension      Hypercholesterolemia      CAD (coronary artery disease)      Pacemaker      History of esophagogastroduodenoscopy (EGD)      Artificial cardiac pacemaker      S/P CABG x 1        FAMILY HISTORY:  No pertinent family history in first degree relatives      [] Family history not pertinent as reviewed with the patient and family    CURRENT MEDICATIONS  MEDICATIONS (STANDING): acetaminophen   IVPB .. 750 milliGRAM(s) IV Intermittent every 6 hours  insulin lispro (ADMELOG) corrective regimen sliding scale   SubCutaneous every 6 hours  pantoprazole  Injectable 40 milliGRAM(s) IV Push daily  sodium chloride 0.9%. 1000 milliLiter(s) IV Continuous <Continuous>    MEDICATIONS (PRN):  --------------------------------------------------------------------------------------------    Vitals:   T(C): 27.8 (12-29-22 @ 08:05), Max: 27.8 (12-29-22 @ 08:05)  HR: 60 (12-29-22 @ 08:49) (60 - 67)  BP: 123/53 (12-29-22 @ 08:18) (90/51 - 132/58)  RR: 24 (12-29-22 @ 08:40) (15 - 24)  SpO2: 100% (12-29-22 @ 08:49) (85% - 100%)  CAPILLARY BLOOD GLUCOSE      POCT Blood Glucose.: 182 mg/dL (29 Dec 2022 07:30)    CAPILLARY BLOOD GLUCOSE      POCT Blood Glucose.: 182 mg/dL (29 Dec 2022 07:30)          --------------------------------------------------------------------------------------------    LABS  CBC (12-29 @ 07:39)                              8.9<L>                         10.81<H>  )----------------(  149<L>     87.6<H>% Neutrophils, 6.0<L>% Lymphocytes, ANC: 9.47<H>                              28.8<L>    BMP (12-29 @ 07:39)             137     |  100     |  85<H> 		Ca++ --      Ca 7.9<L>             ---------------------------------( 205<H>		Mg --                 5.0     |  23      |  3.37<H>			Ph --        LFTs (12-29 @ 07:39)      TPro 5.3<L> / Alb 3.0<L> / TBili 1.1 / DBili -- / AST 26 / ALT 15 / AlkPhos 78        ABG (12-29 @ 09:04)     7.31<L> / 37 / 355<H> / 19<L> / -7.0<L> / 99.1<H>%     Lactate:    ABG (12-29 @ 07:54)     7.26<L> / 38 / 109<H> / 17<L> / -9.2<L> / 97.3%     Lactate:

## 2022-12-29 NOTE — PHYSICAL THERAPY INITIAL EVALUATION ADULT - ADDITIONAL COMMENTS
Patient lives in pvt house with son and family, 2  steps to enter. can function on one level inside. Patient ambulated with rolling walker independent. pt owns w/c at home.

## 2022-12-29 NOTE — CONSULT NOTE ADULT - SUBJECTIVE AND OBJECTIVE BOX
HISTORY OF PRESENT ILLNESS:  89 y/o male w/ a PMHx of CAD s/p triple vessel CABG, HTN, HLD, CKD stage IV, BPH, gout, and anemia who presented as a level 1 trauma after being found down outside. As per the son, patient went to bed around 2130 yesterday and was found down outside around 0600 w/ his walker. There are two steps leading into the backyard. Of note, son states that he is normal A&Ox4 but had been intermittently lethargic & confused after he was found to be COVID-19 positive on 12/25.     PAST MEDICAL HISTORY: S/P CABG x 3    CKD (chronic kidney disease)    Anemia    Gout    BPH (benign prostatic hyperplasia)    Hypertension    Hypercholesterolemia    CAD (coronary artery disease)      PAST SURGICAL HISTORY: Pacemaker    History of esophagogastroduodenoscopy (EGD)    Artificial cardiac pacemaker    S/P CABG x 1      HOME MEDICATIONS: Home Medications:  allopurinol 100 mg oral tablet: dose(s) orally once a day (31 Jan 2020 11:22)  aspirin 81 mg oral delayed release tablet: 1 tab(s) orally every other day (29 Dec 2022 09:39)  furosemide 20 mg oral tablet: 1 tab(s) orally once a day (29 Dec 2022 09:39)  metoprolol succinate 50 mg oral tablet, extended release: 1 tab(s) orally 2 times a day (29 Dec 2022 09:40)  tamsulosin 0.4 mg oral capsule: 1 cap(s) orally once a day (at bedtime) (05 Feb 2020 10:32)    ALLERGIES: No Known Allergies    FAMILY HISTORY: No pertinent family history in first degree relatives      SOCIAL HISTORY:  REVIEW OF SYSTEMS:  Constitutional - no fatigue, fever, weakness, or night sweats  HEENT - no vision changes, ear pain, vertigo, post-nasal drip, changes to smell, sore throat, voice changes, throat swelling, or swollen glands  CV - no chest pain, chest tightness, or palpitations  Resp - no shortness of breath, cough, or wheezing  GI - no abdominal pain, nausea/vomiting, bowel habit changes, indigestion, diarrhea, or constipation  Renal - no pain w/ urination, flank pain, or incontinence  MSK - no muscle cramps, muscle pain, or difficulty walking  Skin - no itching, hives, rashes, unusual bruising, or unusual bleeding  VITAL SIGNS:  ICU Vital Signs Last 24 Hrs  T(C): 33.4 (29 Dec 2022 13:00), Max: 33.4 (29 Dec 2022 13:00)  T(F): 92.1 (29 Dec 2022 13:00), Max: 92.1 (29 Dec 2022 13:00)  HR: 60 (29 Dec 2022 13:30) (59 - 67)  BP: 123/53 (29 Dec 2022 08:18) (90/51 - 132/58)  BP(mean): 70 (29 Dec 2022 08:05) (70 - 70)  ABP: 116/53 (29 Dec 2022 13:30) (90/42 - 158/69)  ABP(mean): 74 (29 Dec 2022 13:30) (60 - 105)  RR: 16 (29 Dec 2022 13:30) (15 - 26)  SpO2: 99% (29 Dec 2022 13:30) (85% - 100%)    O2 Parameters below as of 29 Dec 2022 08:40  Patient On (Oxygen Delivery Method): ventilator          PHYSICAL EXAMINATION:  General -   Neuro -   HEENT -   Lungs -   Heart -   Abdomen -   Extremities -   Skin -   LABS:                        8.2    8.79  )-----------( 130      ( 29 Dec 2022 12:31 )             25.8     12-29    137  |  107  |  75<H>  ----------------------------<  222<H>  3.6   |  18<L>  |  2.84<H>    Ca    7.4<L>      29 Dec 2022 12:31  Phos  2.9     12-29  Mg     1.7     12-29    TPro  5.3<L>  /  Alb  3.0<L>  /  TBili  1.1  /  DBili  x   /  AST  26  /  ALT  15  /  AlkPhos  78  12-29    PT/INR - ( 29 Dec 2022 09:07 )   PT: 17.7 sec;   INR: 1.52 ratio         PTT - ( 29 Dec 2022 09:07 )  PTT:37.4 sec  ABG - ( 29 Dec 2022 12:37 )  pH: 7.30  /  pCO2: 36    /  pO2: 162   / HCO3: 18    / Base Excess: -8.0  /  SaO2: 98.4    Lactate: x                  RECENT CULTURES:    CAPILLARY BLOOD GLUCOSE      POCT Blood Glucose.: 248 mg/dL (29 Dec 2022 11:31)  POCT Blood Glucose.: 182 mg/dL (29 Dec 2022 07:30)    IMAGING STUDIES: HISTORY OF PRESENT ILLNESS:  89 y/o male w/ a PMHx of CAD s/p triple vessel CABG, HTN, HLD, CKD stage IV, BPH, gout, and anemia who presented as a level 1 trauma after being found down outside. As per the son, patient went to bed around 2130 yesterday and was found down outside around 0600 w/ his walker. There are two steps leading into the backyard. Of note, son states that he is normally A&Ox4 but had been intermittently lethargic & confused after he was found to be COVID-19 positive on 12/25. In the ED, GCS was 10 but mental status was progressively worsening so he was intubated for airway protection. He was also hypothermic to 25° C and hypotensive w/ BP in the 60s/40s that improved to the 110s/70s after a liter bolus of warm crystalloid. Secondary survey was significant for dried blood of the scalp & face, a 3 mm laceration above the left eyebrow w/ active bleeding, left periorbital ecchymosis, left nare laceration, and a left lip laceration. Imaging revealed bilateral nasal bone and nasal septum fractures. Unable to obtain ROS as patient is intubated and sedated.    PAST MEDICAL HISTORY:  - CAD (coronary artery disease) S/P CABG x 3  - CKD (chronic kidney disease)  - Anemia  - Gout  - BPH (benign prostatic hyperplasia)  - Hypertension  - Hypercholesterolemia    PAST SURGICAL HISTORY:  - Pacemaker  - History of esophagogastroduodenoscopy (EGD)  - Artificial cardiac pacemaker    HOME MEDICATIONS: Home Medications:  allopurinol 100 mg oral tablet: dose(s) orally once a day (31 Jan 2020 11:22)  aspirin 81 mg oral delayed release tablet: 1 tab(s) orally every other day (29 Dec 2022 09:39)  furosemide 20 mg oral tablet: 1 tab(s) orally once a day (29 Dec 2022 09:39)  metoprolol succinate 50 mg oral tablet, extended release: 1 tab(s) orally 2 times a day (29 Dec 2022 09:40)  tamsulosin 0.4 mg oral capsule: 1 cap(s) orally once a day (at bedtime) (05 Feb 2020 10:32)    ALLERGIES: No Known Allergies    FAMILY HISTORY: No pertinent family history in first degree relatives      SOCIAL HISTORY:  REVIEW OF SYSTEMS:  Constitutional - no fatigue, fever, weakness, or night sweats  HEENT - no vision changes, ear pain, vertigo, post-nasal drip, changes to smell, sore throat, voice changes, throat swelling, or swollen glands  CV - no chest pain, chest tightness, or palpitations  Resp - no shortness of breath, cough, or wheezing  GI - no abdominal pain, nausea/vomiting, bowel habit changes, indigestion, diarrhea, or constipation  Renal - no pain w/ urination, flank pain, or incontinence  MSK - no muscle cramps, muscle pain, or difficulty walking  Skin - no itching, hives, rashes, unusual bruising, or unusual bleeding  VITAL SIGNS:  ICU Vital Signs Last 24 Hrs  T(C): 33.4 (29 Dec 2022 13:00), Max: 33.4 (29 Dec 2022 13:00)  T(F): 92.1 (29 Dec 2022 13:00), Max: 92.1 (29 Dec 2022 13:00)  HR: 60 (29 Dec 2022 13:30) (59 - 67)  BP: 123/53 (29 Dec 2022 08:18) (90/51 - 132/58)  BP(mean): 70 (29 Dec 2022 08:05) (70 - 70)  ABP: 116/53 (29 Dec 2022 13:30) (90/42 - 158/69)  ABP(mean): 74 (29 Dec 2022 13:30) (60 - 105)  RR: 16 (29 Dec 2022 13:30) (15 - 26)  SpO2: 99% (29 Dec 2022 13:30) (85% - 100%)    O2 Parameters below as of 29 Dec 2022 08:40  Patient On (Oxygen Delivery Method): ventilator          PHYSICAL EXAMINATION:  General -   Neuro -   HEENT -   Lungs -   Heart -   Abdomen -   Extremities -   Skin -   LABS:                        8.2    8.79  )-----------( 130      ( 29 Dec 2022 12:31 )             25.8     12-29    137  |  107  |  75<H>  ----------------------------<  222<H>  3.6   |  18<L>  |  2.84<H>    Ca    7.4<L>      29 Dec 2022 12:31  Phos  2.9     12-29  Mg     1.7     12-29    TPro  5.3<L>  /  Alb  3.0<L>  /  TBili  1.1  /  DBili  x   /  AST  26  /  ALT  15  /  AlkPhos  78  12-29    PT/INR - ( 29 Dec 2022 09:07 )   PT: 17.7 sec;   INR: 1.52 ratio         PTT - ( 29 Dec 2022 09:07 )  PTT:37.4 sec  ABG - ( 29 Dec 2022 12:37 )  pH: 7.30  /  pCO2: 36    /  pO2: 162   / HCO3: 18    / Base Excess: -8.0  /  SaO2: 98.4    Lactate: x                  RECENT CULTURES:    CAPILLARY BLOOD GLUCOSE      POCT Blood Glucose.: 248 mg/dL (29 Dec 2022 11:31)  POCT Blood Glucose.: 182 mg/dL (29 Dec 2022 07:30)    IMAGING STUDIES: HISTORY OF PRESENT ILLNESS:  89 y/o male w/ a PMHx of congenital deafness (communicates w/ ASL), CAD s/p triple vessel CABG, s/p biventricular PPM, HTN, HLD, CKD stage IV, BPH, gout, and anemia who presented as a level 1 trauma after being found down outside. As per the son, patient went to bed around 2130 yesterday and was found down outside around 0600 w/ his walker. There are two steps that lead outdoor. Of note, son states that he is normally A&Ox4 but had been intermittently lethargic & confused after he was found to be COVID-19 positive on 12/25. In the ED, GCS was 10 but mental status was progressively worsening so he was intubated for airway protection. He was also hypothermic to 25° C and hypotensive w/ BP in the 60s/40s that improved to the 110s/70s after a liter bolus of warm crystalloid but he became hypotensive again so vasopressor support was initiated and a right femoral arterial line was placed. Secondary survey was significant for dried blood of the scalp & face, a 2 cm laceration above the left eyebrow w/ active bleeding, left periorbital ecchymosis, 1 cm left nare laceration, and a 2 cm left lip laceration. Imaging revealed bilateral nasal bone and nasal septum fractures. SICU consulted for further management. Unable to obtain ROS as patient is intubated and sedated.    PAST MEDICAL HISTORY:  - CAD (coronary artery disease) S/P CABG x 3  - CKD (chronic kidney disease)  - Anemia  - Gout  - BPH (benign prostatic hyperplasia)  - Hypertension  - Hypercholesterolemia    PAST SURGICAL HISTORY:  - Pacemaker  - History of esophagogastroduodenoscopy (EGD)  - Artificial cardiac pacemaker    HOME MEDICATIONS:  - allopurinol 100 mg oral tablet: dose(s) orally once a day  - aspirin 81 mg oral delayed release tablet: 1 tab(s) orally every other day  - furosemide 20 mg oral tablet: 1 tab(s) orally once a day  - metoprolol succinate 50 mg oral tablet, extended release: 1 tab(s) orally 2 times a day  - tamsulosin 0.4 mg oral capsule: 1 cap(s) orally once a day (at bedtime)    ALLERGIES: No known allergies as per the son    FAMILY HISTORY: No significant history in first degree relatives as per son    SOCIAL HISTORY: Son states patient does not use EtOH, tobacco, or illicit substances    REVIEW OF SYSTEMS: Unable to obtain as patient is intubated & obtunded    VITAL SIGNS:  T(C): 33.4 (29 Dec 2022 13:00), Max: 33.4 (29 Dec 2022 13:00)  T(F): 92.1 (29 Dec 2022 13:00), Max: 92.1 (29 Dec 2022 13:00)  HR: 60 (29 Dec 2022 13:30) (59 - 67)  BP: 123/53 (29 Dec 2022 08:18) (90/51 - 132/58)  BP(mean): 70 (29 Dec 2022 08:05) (70 - 70)  ABP: 116/53 (29 Dec 2022 13:30) (90/42 - 158/69)  ABP(mean): 74 (29 Dec 2022 13:30) (60 - 105)  RR: 16 (29 Dec 2022 13:30) (15 - 26)  SpO2: 99% (29 Dec 2022 13:30) (85% - 100%)    PHYSICAL EXAMINATION:  General - cachetic, unresponsive (secondary to paralysis for intubation)  Neuro - unresponsive to noxious stimuli (secondary to paralysis for intubation), unable to assess left pupil secondary to periorbital edema  HEENT - 2 cm laceration above the left eyebrow w/ active bleeding, left periorbital ecchymosis, 1 cm left nare laceration, 2 cm left lip laceration, dried blood throughout face & scalp  Lungs - mild decrease in breath sound at bases, mechanically ventilated  Heart - regular rate and rhythm, paced  Abdomen - soft, nontender, nondistended  Extremities - all four extremities w/ 2+ pulses, unable to assess strength & sensation as patient is unresponsive & paralyzed from intubation  Skin - lacerations as noted in HEENT, skin is otherwise warm & pink w/ no unusual lesions or rashes on gross examination    LABS:                        8.2    8.79  )-----------( 130      ( 29 Dec 2022 12:31 )             25.8    137  |  107  |  75<H>  ----------------------------<  222<H>  3.6   |  18<L>  |  2.84<H>    Ca    7.4<L>      29 Dec 2022 12:31  Phos  2.9  Mg     1.7  TPro  5.3<L>  /  Alb  3.0<L>  /  TBili  1.1  /  DBili  x   /  AST  26  /  ALT  15  /  AlkPhos  78    PT/INR - ( 29 Dec 2022 09:07 )   PT: 17.7 sec;   INR: 1.52 ratio    PTT - ( 29 Dec 2022 09:07 )  PTT:37.4 sec    ABG - ( 29 Dec 2022 12:37 )  pH: 7.30  /  pCO2: 36    /  pO2: 162   / HCO3: 18    / Base Excess: -8.0  /  SaO2: 98.4  /  Lactate: 1.0    CAPILLARY BLOOD GLUCOSE:  - 248 mg/dL (29 Dec 2022 11:31)  - 182 mg/dL (29 Dec 2022 07:30)    IMAGING STUDIES:    ACC: [id number] EXAM:  CT 3D RECONSTRUCT W [facility]                        ACC: [id number] EXAM:  CT CERVICAL SPINE                        ACC: [id number] EXAM:  CT BRAIN                        ACC: [id number] EXAM:  CT MAXILLOFACIAL                          FINDINGS:  - CT head: Large left anterior frontal scalp hematoma and swelling. The ventricles, cisterns and sulci are prominent, consistent with generalized volume loss. There is no acute loss of gray-white differentiation. There are moderate patchy and confluent areas of hypodensity in the periventricular and subcortical white matter which are likely related to chronic microangiopathic changes. There is no evidence of mass effect, midline shift, acute intracranial hemorrhage, or extra-axial collections. The calvarium is intact.  - CT cervical spine: There is maintenance of usual cervical lordosis. There is no significant subluxation. Vertebral body height is preserved throughout the cervical spine. Moderate to severe multilevel disc space narrowing at C4-C7. Mild multilevel diffuse disc osteophyte complexes indent the thecal sac and contribute to central canal narrowing. No definite high-grade central canal stenosis. Multilevel high-grade facet hypertrophy and uncovertebral spurring contribute to variable degree neural foraminal stenosis throughout the cervical spine. The paravertebral soft tissues are unremarkable. Small right pleural effusion.  - CT maxillofacial: Large left periorbital soft tissue swelling and hematoma. The bony orbits are intact. There has been prior bilateral cataract surgery. The globes are symmetric and intact. There is no retrobulbar hematoma. The extraocular muscles and optic nerve sheath complexes are unremarkable. There is a nonspecific 0.9 x 0.6 x 0.6 cm nodular lesion along the lateral aspect of the left inferior rectus muscle. Comminuted bilateralnasal bone fractures. Nondisplaced bony nasal septal fracture. The mandible, maxilla, zygoma and pterygoid plates are intact. Moderate mucosal of the ethmoid air cells. Moderate mucosal thickening in both maxillary sinuses moderate mucosal thickening of both sphenoid sinuses. Moderate likely hemorrhagic fluid level in the right sphenoid sinus. Trace opacification of the left mastoid air cells inferiorly.    IMPRESSION:  - CT HEAD: Large left anterior frontal scalp hematoma and swelling There is no acute intracranial hemorrhage or depressed calvarial fracture.  - CT CERVICAL SPINE: No fracture or acute traumatic malalignment. Degenerative changes as described.  - CT maxillofacial: Comminuted bilateral nasal bone fractures and fracture of the bony nasal septum. Large left periorbital swelling and hematoma. The bony orbits and intraocular contents are intact. No retrobulbar hematoma. There is a nonspecific 0.9 cm nodular lesion along the lateral aspect of the left inferior rectus muscle. Further evaluation may be obtained with nonemergent contrast-enhanced MRI of the orbits if no contraindication.      ACC: [id number] EXAM:  CT CHEST                        ACC: [id number] EXAM:  CT ABDOMEN AND PELVIS                          CHEST:  LUNGS AND LARGE AIRWAYS: Endotracheal tube terminates within the mid trachea. Respiratory motion artifact. Airway wall thickening. Air-fluid levels within bilateral lower lobe lobar bronchi. Fluid density within majority of bilateral lower lobe segmental bronchi. Interlobular septal thickening and patchy bilateral lower lobe mostly   groundglass opacities with small areas of consolidation may represent pulmonary interstitial and alveolar edema versus underlying airspace disease. Minimal peripheral right upper lobe mixed interstitial and alveolar   infiltrate. Small right lower lobe air cyst is similar compared with prior exam probably representing a bulla versus pneumatocele. Probable left lower lobe pulmonary nodules measuring up to 7 mm are partially obscured by motion artifact and airspace disease.  PLEURA: Small pleural effusions, larger on the right, with adjacent atelectasis. No pneumothorax  VESSELS: Atherosclerotic changes of the aorta and native coronary arteries.  HEART: Mild cardiomegaly No pericardial effusion. CABG. Hypodensity of the blood pool in relation to left ventricular myocardium suggests underlying anemia. Cardiac generator pack implanted within the anterior left upper chest wall with right atrial and ventricular electrodes as well as a single retained abandoned lead curled inferiorly within the right atrium  MEDIASTINUM AND PRASHANTH: Calcified right hilar and mediastinal lymph nodes.  CHEST WALL AND LOWER NECK: Small volume of intravascular air within the right internal jugular and subclavian veins as well as right upper extremity and chest branch veins likely correlates with venous catheter   placement attempt. Right upper extremity venous varicosities measuring up to approximately 2.2 cm. Nondisplaced subacute to chronic fractures of right sixth through ninth ribs posteriorly. Healed sternotomy without wires. Mild bilateral gynecomastia.    ABDOMEN AND PELVIS:  LIVER: Unremarkable as imaged  BILE DUCTS: Normal caliber.  GALLBLADDER: Cholelithiasis.  SPLEEN: Within normal size limits.  PANCREAS: Atrophic. Likely vascular calcifications at the level of the pancreatic head. Probable adjacent cysts at the pancreatic head and neck measuring up to 1 cm  ADRENALS: Within normal limits.  KIDNEYS/URETERS: Mildly decreased renal length bilaterally. No hydronephrosis or renal calculus. Variably sized bilateral hypodensities probably represent cysts although are incompletely assessed by this exam  BLADDER: Partially obscured by metallic imaging artifact from the left total hip arthroplasty. Contains Davis catheter and small volume nondependent intraluminal air.  REPRODUCTIVE ORGANS: Obscured by imaging artifact  BOWEL: Unobstructed. Colonic diverticulosis  PERITONEUM: Small bowel mesenteric edema. Small volume dependent pelvic fluid  VESSELS: Atherosclerotic changes. Right femoral arterial catheter terminates within the mid right external iliac artery  RETROPERITONEUM/LYMPH NODES: No lymphadenopathy.  ABDOMINAL WALL: Cachexia  BONES: Left total hip arthroplasty. Multilevel degenerative changes throughout the spine.    IMPRESSION:  - No imaging evidence of acute traumatic injury involving the chest, abdomen or pelvis.  - Likely aspirated material within bilateral lower lobes.  - Pulmonary interstitial edema with probable alveolar edema versus pneumonia or aspiration. Small pleural effusions

## 2022-12-29 NOTE — ED PROVIDER NOTE - NSICDXPASTSURGICALHX_GEN_ALL_CORE_FT
PAST SURGICAL HISTORY:  Artificial cardiac pacemaker     History of esophagogastroduodenoscopy (EGD)     Pacemaker     S/P CABG x 1

## 2022-12-29 NOTE — PROCEDURE NOTE - NSPROCDETAILS_GEN_ALL_CORE
location identified, draped/prepped, sterile technique used, needle inserted/introduced/positive blood return obtained via catheter/connected to a pressurized flush line/sutured in place/hemostasis with direct pressure, dressing applied/Seldinger technique/all materials/supplies accounted for at end of procedure ultrasound used for arterial puncture/location identified, draped/prepped, sterile technique used, needle inserted/introduced/positive blood return obtained via catheter/connected to a pressurized flush line/sutured in place/hemostasis with direct pressure, dressing applied/Seldinger technique/all materials/supplies accounted for at end of procedure

## 2022-12-29 NOTE — H&P ADULT - ASSESSMENT
ASSESSMENT:  89 year old male history of CAD, s/p CABG, PPM, HTN, CKD 4, HLD, gout, upper GI bleed 2/2022, recent COVID 12/25/22, presenting as a level 1 trauma activation after being found down unresponsive in his backyard by his son. Patient arrived hypothermic to 78F, hypotensive to 60/40, responded to 110's/70's with warm IVF resuscitation, HR 70's, O2 100%, GCS 10 (4,2,4). During initial resuscitation became progressively more obtunded and was intubated for airway protection  eFAST negative  CXR and PXR in trauma bay unremarkable    PLAN:  - Admit to trauma surgery in SICU, Dr. Nava  - F/u CT H/Cspine/C/A/P  - C/w resuscitation for hypothermia   - Wean vent as tolerated  - NPO, aspiration precautions  - Received Zosyn/Vanco in ED  - DVT ppx if no contraindication  - Evaluated with Dr. Nava and Dr. Julianne David, PGY 5  Surgery [id number]

## 2022-12-30 NOTE — DIETITIAN INITIAL EVALUATION ADULT - REASON FOR ADMISSION
Pt is a 89 yo M with PMH: congenital deafness (communicates with ASL), CAD, s/p triple vessel CABG, s/p biventricular PPM, HTN, CKD stage IV, BPH, gout, anemia. Presented as a level 1 trauma after being found down outside. Diagnosed with COVID19 on 12/25. In the ED, mental status was progressively worsening, so intubated for airway protection. Pt was also hypothermic and hypotensive. Vasopressor support initiated, R femoral arterial line placed and central line placed. Imaging revealed bilateral nasal bone and nasal septum fractures. S/P laceration repair 12/29. Pt admitted to SICU for shock, ?septic/hypothermic. CT head negative for any acute pathology.

## 2022-12-30 NOTE — DIETITIAN INITIAL EVALUATION ADULT - ADD RECOMMEND
1. Recommend multivitamin (if no medication contraindications) to aid in prevention of micronutrient deficiencies. Trend electrolytes and replete PRN.  2. Defer advancement of diet to medical team. If EN feeds initiated, monitor GI tolerance. RD to remain available to adjust EN formulary, volume/rate PRN.   3. Monitor wt trends/labs/skin integrity/hydration status/bowel regularity.

## 2022-12-30 NOTE — DIETITIAN INITIAL EVALUATION ADULT - ORAL INTAKE PTA/DIET HISTORY
Spoke with son of pt to obtain collateral diet hx. Pt intubated at this time. Per son, reports pt with good appetite/PO intake PTA. Consumes three meals daily; lives with son and daughter-in-law who assist in food procurement and meal preparation. Denies specific dietary preferences (enjoys dry cereal, orange/banana, sandwiches, fish/chicken, etc). Denies food allergies. Endorsed recently coughing more often after swallowing thin liquids, however eats regular consistency diet (occasionally requires meat cut up). Denies N/V; occasional bouts of diarrhea/constipation. Denies intake of vitamins/supplements PTA.

## 2022-12-30 NOTE — DIETITIAN INITIAL EVALUATION ADULT - NSFNSPHYEXAMSKINFT_GEN_A_CORE
skin lesions: R heel, B/L shin, B/L shoulders, B/L knuckles; wound: L lateral eyelid laceration, L eyebrow laceration, bridge of nose laceration, L upper lip laceration

## 2022-12-30 NOTE — DIETITIAN INITIAL EVALUATION ADULT - PERTINENT LABORATORY DATA
12-30    136  |  107  |  73<H>  ----------------------------<  164<H>  4.7   |  17<L>  |  3.06<H>    Ca    7.6<L>      30 Dec 2022 05:01  Phos  5.2     12-30  Mg     2.2     12-30    TPro  5.3<L>  /  Alb  3.0<L>  /  TBili  1.1  /  DBili  x   /  AST  26  /  ALT  15  /  AlkPhos  78  12-29  POCT Blood Glucose.: 163 mg/dL (12-30-22 @ 04:54)  A1C with Estimated Average Glucose Result: 5.6 % (12-29-22 @ 12:31)

## 2022-12-30 NOTE — DIETITIAN INITIAL EVALUATION ADULT - ENTERAL
Defer initiation of EN to medical team. If warranted, consider Vital AF at 10-20ml/hr. Increase as tolerated to GOAL rate 50ml/hr x 24 hrs. To provide (based on dosing wt 60kg): 1200ml, 1440kcal (24kcal/kg), 90g protein (1.5g protein/kg).

## 2022-12-30 NOTE — PROGRESS NOTE ADULT - ATTENDING COMMENTS
89 y/o male w/ a PMHx of CAD s/p triple vessel CABG, HTN, HLD, CKD stage IV, BPH, gout, and anemia who presented as a level 1 trauma after being found down outside. As per the son, patient went to bed around 2130 yesterday and was found down outside around 0600 w/ his walker. According to son he is normal A&Ox4 but had been intermittently lethargic & confused after he was found to be COVID-19 positive on 12/25.   Injuries include facial lac and fx. Pt hypothermic    More awake, follows simple command with sign language. Pt is deaf  Acute resp failure post trauma, ABG metabolic and resp acidosis, vent adj with increase in Ve    In septic cardiogenic shock, SVO2 66  ECHO EF 2%  Titrate Levo, on Vaso, add Dobutamine    BC grew GPC in pairs and chain on Vanco and Meropenem. Vanco dose by level  Pt COVID +, not a candidate for Remdesivir sec to CKD  Difficulty passing NGT, will attempt again, tube feed if able to pass  IVF with bicarb to improve metabolic acidosis, correction of metabolic acidosis will help in extubation  ISS/HbA1C  DVT ppx with renally adjusted dose Lovenox, stable HCT

## 2022-12-30 NOTE — PROGRESS NOTE ADULT - ASSESSMENT
89 year old male history of CAD, s/p CABG, PPM, HTN, CKD 4, HLD, gout, upper GI bleed 2/2022, recent COVID 12/25/22, presenting as a level 1 trauma activation after being found down unresponsive in his backyard by his son. Patient arrived hypothermic to 78F, hypotensive to 60/40, responded to 110's/70's with warm IVF resuscitation, HR 70's, O2 100%, GCS 10 (4,2,4). During initial resuscitation became progressively more obtunded and was intubated for airway protection. eFAST negative  CXR and PXR in trauma bay unremarkable    PLAN:  - Wean vent as tolerated        Surgery [id number] 89 year old male history of CAD, s/p CABG, PPM, HTN, CKD 4, HLD, gout, upper GI bleed 2/2022, recent COVID 12/25/22, presenting as a level 1 trauma activation after being found down unresponsive in his backyard by his son. Patient arrived hypothermic to 78F, hypotensive to 60/40, responded to 110's/70's with warm IVF resuscitation, HR 70's, O2 100%, GCS 10 (4,2,4). During initial resuscitation became progressively more obtunded and was intubated for airway protection. eFAST negative  CXR and PXR in trauma bay unremarkable    PLAN:  - Wean vent as tolerated  - tertiary when patient is more responsive   - appreciate excellent care per SICU        Surgery [id number]

## 2022-12-30 NOTE — PROGRESS NOTE ADULT - ASSESSMENT
Patient is a 89 y/o male w/ a PMHx of congenital deafness (communicates w/ ASL), CAD s/p triple vessel CABG, s/p biventricular PPM, HTN, HLD, CKD stage IV, BPH, gout, and anemia presenting after being found down outside hypothermic, hypotensive, and altered requiring intubation AMS w/ imaging revealing bilateral nasal bone and nasal septum fractures. Patient admits to SICU for shock, ?septic, hypothermic.     Neuro: altered mental status, intubated  - CT head negative for any acute pathology   - Multimodal pain control w/ standing IV acetaminophen and low dose PRN Dilaudid IVP    Resp: intubated for altered mental status, bilateral pleural effusions  - Mechanical ventilation for as patient was intubated for altered mental status  - Will SBT w/ goal to extubate when patient is awake & able to follow commands      CV: Shock, distributive?  - Will wean vasopressor support w/ goal MAP > 65 mmHg  - Patient w/ significant bleeding from facial lacerations so will transfuse 2 units of PRBCs  -Lactate is 1.3  -echo ordered   -PPM interrogated: Presenting rhythm: AFL-VPaced  Underlying rhythm: AFL w/ CHB, Pt is pacemaker dependent.     GI: no acute issues  - NPO  - Unable to place OGT as patient has significant edema of the soft palate  - Protonix for stress ulcer prophylaxis    Renal: Acute on CKD stage IV (baseline Cr 2-3)  - NS at 75 mL/hr while NPO  - s/p 1L fluid and 2 units of blood     Heme:   - Monitor CBC and coags  - VTE ppx: HSQ  -SCDs    ID: COVID-19 infection  - Will not give remdesivir for now given patient's LUCY  - currenlty on meropenem: emp  -cultures sent, UA neg  - Will send procalcitonin    Endo: hyperglycemia  - Monitor glucose w/ ISS q6hrs for glycemic control  - Will send HgbA1C    MSK: bilateral nasal bone & nasal septum fracture, facial lacerations  - Outpatient follow-up w/ plastics  - Sinus precautions  - Facial lacerations repaired w/ Nylon sutures on 12/29           Patient is a 89 y/o male w/ a PMHx of congenital deafness (communicates w/ ASL), CAD s/p triple vessel CABG, s/p biventricular PPM, HTN, HLD, CKD stage IV, BPH, gout, and anemia presenting after being found down outside hypothermic, hypotensive, and altered requiring intubation AMS w/ imaging revealing bilateral nasal bone and nasal septum fractures. Patient admits to SICU for shock, ?septic, hypothermic.     Neuro: altered mental status, intubated  - CT head negative for any acute pathology   - Multimodal pain control w/ standing IV acetaminophen and low dose PRN Dilaudid IVP    Resp: intubated for altered mental status, bilateral pleural effusions  - Mechanical ventilation for as patient was intubated for altered mental status  - Will SBT w/ goal to extubate when patient is awake & able to follow commands      CV: Shock, distributive?  - Will wean vasopressor support w/ goal MAP > 65 mmHg  - Patient w/ significant bleeding from facial lacerations so will transfuse 2 units of PRBCs  -Lactate is 1.3  - POCUS 12/30- hypokinetic  - Dobutamine started  -echo ordered   -PPM interrogated: Presenting rhythm: AFL-VPaced  Underlying rhythm: AFL w/ CHB, Pt is pacemaker dependent.     GI: no acute issues  - NPO  - Unable to place OGT as patient has significant edema of the soft palate  - Protonix for stress ulcer prophylaxis    Renal: Acute on CKD stage IV (baseline Cr 2-3)  - NS at 75 mL/hr while NPO  - s/p 1L fluid and 2 units of blood   - bicarb gtt started    Heme:   - Monitor CBC and coags  - VTE ppx: HSQ  -SCDs    ID: COVID-19 infection  - Will not give remdesivir for now given patient's LUCY  - currenlty on meropenem: emp  -cultures sent, UA neg  - Will send procalcitonin  - daily vanc levels    Endo: hyperglycemia  - Monitor glucose w/ ISS q6hrs for glycemic control  - Will send HgbA1C    MSK: bilateral nasal bone & nasal septum fracture, facial lacerations  - Outpatient follow-up w/ plastics  - Sinus precautions  - Facial lacerations repaired w/ Nylon sutures on 12/29

## 2022-12-30 NOTE — PROGRESS NOTE ADULT - SUBJECTIVE AND OBJECTIVE BOX
Trauma Surgery Progress Note  Patient is a 89y old  Male who presents with a chief complaint of Pt is a 89 yo M with PMH: congenital deafness (communicates with ASL), CAD, s/p triple vessel CABG, s/p biventricular PPM, HTN, CKD stage IV, BPH, gout, anemia. Presented as a level 1 trauma after being found down outside. Diagnosed with COVID19 on . In the ED, mental status was progressively worsening, so intubated for airway protection. Pt was also hypothermic and hypotensive. Vasopressor support initiated, R femoral arterial line placed and central line placed. Imaging revealed bilateral nasal bone and nasal septum fractures. S/P laceration repair . Pt admitted to SICU for shock, ?septic/hypothermic. CT head negative for any acute pathology.      (30 Dec 2022 07:02)      INTERVAL EVENTS:   - patient is slowly waking up, remain intubated  -coming down slowly on levophed drip  -VTE ppx: HSQ  -PPM interrogated: Presenting rhythm: AFL-VPaced  Underlying rhythm: AFL w/ CHB, Pt is pacemaker dependent.  - POCUS- euvolemic, hypokinetic      SUBJECTIVE: Patient seen and examined at bedside with surgical team. Pt intubated and sedated, mildly arousable to touch. Pt requiring both natacha and vaso for pressure support.    OBJECTIVE:    Vital Signs Last 24 Hrs  T(C): 36.2 (30 Dec 2022 11:00), Max: 37.4 (29 Dec 2022 18:00)  T(F): 97.2 (30 Dec 2022 11:00), Max: 99.3 (29 Dec 2022 18:00)  HR: 63 (30 Dec 2022 12:30) (59 - 99)  BP: 112/57 (30 Dec 2022 07:00) (112/57 - 122/59)  BP(mean): 85 (30 Dec 2022 00:15) (83 - 85)  RR: 16 (30 Dec 2022 12:30) (11 - 25)  SpO2: 99% (30 Dec 2022 12:30) (95% - 100%)    Parameters below as of 30 Dec 2022 07:00  Patient On (Oxygen Delivery Method): ventilator    O2 Concentration (%): 30I&O's Detail    29 Dec 2022 07:01  -  30 Dec 2022 07:00  --------------------------------------------------------  IN:    Dexmedetomidine: 7.5 mL    IV PiggyBack: 1100 mL    IV PiggyBack: 300 mL    Norepinephrine: 663.9 mL    PRBCs (Packed Red Blood Cells): 600 mL    sodium chloride 0.9%: 1650 mL    Vasopressin: 81 mL  Total IN: 4402.4 mL    OUT:    Indwelling Catheter - Urethral (mL): 760 mL  Total OUT: 760 mL    Total NET: 3642.4 mL      30 Dec 2022 07:01  -  30 Dec 2022 13:17  --------------------------------------------------------  IN:    Dexmedetomidine: 3 mL    IV PiggyBack: 100 mL    Norepinephrine: 60.9 mL    sodium chloride 0.9%: 375 mL    Vasopressin: 22.5 mL  Total IN: 561.4 mL    OUT:    Indwelling Catheter - Urethral (mL): 105 mL  Total OUT: 105 mL    Total NET: 456.4 mL      MEDICATIONS  (STANDING):  acetaminophen   IVPB .. 1000 milliGRAM(s) IV Intermittent every 6 hours  artificial  tears Solution 1 Drop(s) Both EYES four times a day  bacitracin   Ointment 1 Application(s) Topical two times a day  chlorhexidine 0.12% Liquid 15 milliLiter(s) Oral Mucosa every 12 hours  chlorhexidine 2% Cloths 1 Application(s) Topical <User Schedule>  dexMEDEtomidine Infusion 0.2 MICROgram(s)/kG/Hr (3 mL/Hr) IV Continuous <Continuous>  DOBUTamine Infusion 2.5 MICROgram(s)/kG/Min (4.5 mL/Hr) IV Continuous <Continuous>  enoxaparin Injectable 30 milliGRAM(s) SubCutaneous every 24 hours  insulin lispro (ADMELOG) corrective regimen sliding scale   SubCutaneous every 6 hours  meropenem  IVPB 500 milliGRAM(s) IV Intermittent every 12 hours  norepinephrine Infusion 0.19 MICROgram(s)/kG/Min (21.4 mL/Hr) IV Continuous <Continuous>  pantoprazole  Injectable 40 milliGRAM(s) IV Push daily  sodium bicarbonate  Infusion 0.075 mEq/kG/Hr (30 mL/Hr) IV Continuous <Continuous>  sodium chloride 0.65% Nasal 1 Spray(s) Both Nostrils daily  sodium chloride 0.9%. 1000 milliLiter(s) (75 mL/Hr) IV Continuous <Continuous>  vasopressin Infusion 0.03 Unit(s)/Min (4.5 mL/Hr) IV Continuous <Continuous>    MEDICATIONS  (PRN):  HYDROmorphone  Injectable 0.25 milliGRAM(s) IV Push every 4 hours PRN Severe Pain (7 - 10)      PHYSICAL EXAM:  Constitutional: intubated and sedated  Respiratory: Unlabored breathing  Abdomen: Soft, nondistended, NTTP. No rebound or guarding.  Extremities: WWP, HANCOCK spontaneously    LABS:                        8.8    13.53 )-----------( 132      ( 30 Dec 2022 09:12 )             26.8     12-    137  |  110<H>  |  75<H>  ----------------------------<  129<H>  4.8   |  16<L>  |  3.11<H>    Ca    7.8<L>      30 Dec 2022 09:12  Phos  4.8     12-  Mg     2.2         TPro  5.3<L>  /  Alb  3.0<L>  /  TBili  1.1  /  DBili  x   /  AST  26  /  ALT  15  /  AlkPhos  78  12-    PT/INR - ( 30 Dec 2022 01:05 )   PT: 15.7 sec;   INR: 1.35 ratio         PTT - ( 30 Dec 2022 01:05 )  PTT:35.5 sec  LIVER FUNCTIONS - ( 29 Dec 2022 07:39 )  Alb: 3.0 g/dL / Pro: 5.3 g/dL / ALK PHOS: 78 U/L / ALT: 15 U/L / AST: 26 U/L / GGT: x           Urinalysis Basic - ( 29 Dec 2022 09:07 )    Color: Light Yellow / Appearance: Clear / S.016 / pH: x  Gluc: x / Ketone: Negative  / Bili: Negative / Urobili: Negative   Blood: x / Protein: Trace / Nitrite: Negative   Leuk Esterase: Negative / RBC: 9 /hpf / WBC 1 /HPF   Sq Epi: x / Non Sq Epi: 1 /hpf / Bacteria: Negative          IMAGING:     Trauma Surgery Progress Note  Patient is a 89y old  Male who presents with a chief complaint of Pt is a 89 yo M with PMH: congenital deafness (communicates with ASL), CAD, s/p triple vessel CABG, s/p biventricular PPM, HTN, CKD stage IV, BPH, gout, anemia. Presented as a level 1 trauma after being found down outside. Diagnosed with COVID19 on . In the ED, mental status was progressively worsening, so intubated for airway protection. Pt was also hypothermic and hypotensive. Vasopressor support initiated, R femoral arterial line placed and central line placed. Imaging revealed bilateral nasal bone and nasal septum fractures. S/P laceration repair . Pt admitted to SICU for shock, ?septic/hypothermic. CT head negative for any acute pathology.      (30 Dec 2022 07:02)      INTERVAL EVENTS:   - patient is slowly waking up, remain intubated  -coming down slowly on levophed drip  -VTE ppx: HSQ  -PPM interrogated: Presenting rhythm: AFL-VPaced  Underlying rhythm: AFL w/ CHB, Pt is pacemaker dependent.  - POCUS- euvolemic, hypokinetic      SUBJECTIVE: Patient seen and examined at bedside with surgical team. Pt intubated and sedated, mildly arousable to touch. Pt requiring both natacha and vaso for pressure support.    OBJECTIVE:    Vital Signs Last 24 Hrs  T(C): 36.2 (30 Dec 2022 11:00), Max: 37.4 (29 Dec 2022 18:00)  T(F): 97.2 (30 Dec 2022 11:00), Max: 99.3 (29 Dec 2022 18:00)  HR: 63 (30 Dec 2022 12:30) (59 - 99)  BP: 112/57 (30 Dec 2022 07:00) (112/57 - 122/59)  BP(mean): 85 (30 Dec 2022 00:15) (83 - 85)  RR: 16 (30 Dec 2022 12:30) (11 - 25)  SpO2: 99% (30 Dec 2022 12:30) (95% - 100%)    Parameters below as of 30 Dec 2022 07:00  Patient On (Oxygen Delivery Method): ventilator    O2 Concentration (%): 30I&O's Detail    29 Dec 2022 07:01  -  30 Dec 2022 07:00  --------------------------------------------------------  IN:    Dexmedetomidine: 7.5 mL    IV PiggyBack: 1100 mL    IV PiggyBack: 300 mL    Norepinephrine: 663.9 mL    PRBCs (Packed Red Blood Cells): 600 mL    sodium chloride 0.9%: 1650 mL    Vasopressin: 81 mL  Total IN: 4402.4 mL    OUT:    Indwelling Catheter - Urethral (mL): 760 mL  Total OUT: 760 mL    Total NET: 3642.4 mL      30 Dec 2022 07:01  -  30 Dec 2022 13:17  --------------------------------------------------------  IN:    Dexmedetomidine: 3 mL    IV PiggyBack: 100 mL    Norepinephrine: 60.9 mL    sodium chloride 0.9%: 375 mL    Vasopressin: 22.5 mL  Total IN: 561.4 mL    OUT:    Indwelling Catheter - Urethral (mL): 105 mL  Total OUT: 105 mL    Total NET: 456.4 mL      MEDICATIONS  (STANDING):  acetaminophen   IVPB .. 1000 milliGRAM(s) IV Intermittent every 6 hours  artificial  tears Solution 1 Drop(s) Both EYES four times a day  bacitracin   Ointment 1 Application(s) Topical two times a day  chlorhexidine 0.12% Liquid 15 milliLiter(s) Oral Mucosa every 12 hours  chlorhexidine 2% Cloths 1 Application(s) Topical <User Schedule>  dexMEDEtomidine Infusion 0.2 MICROgram(s)/kG/Hr (3 mL/Hr) IV Continuous <Continuous>  DOBUTamine Infusion 2.5 MICROgram(s)/kG/Min (4.5 mL/Hr) IV Continuous <Continuous>  enoxaparin Injectable 30 milliGRAM(s) SubCutaneous every 24 hours  insulin lispro (ADMELOG) corrective regimen sliding scale   SubCutaneous every 6 hours  meropenem  IVPB 500 milliGRAM(s) IV Intermittent every 12 hours  norepinephrine Infusion 0.19 MICROgram(s)/kG/Min (21.4 mL/Hr) IV Continuous <Continuous>  pantoprazole  Injectable 40 milliGRAM(s) IV Push daily  sodium bicarbonate  Infusion 0.075 mEq/kG/Hr (30 mL/Hr) IV Continuous <Continuous>  sodium chloride 0.65% Nasal 1 Spray(s) Both Nostrils daily  sodium chloride 0.9%. 1000 milliLiter(s) (75 mL/Hr) IV Continuous <Continuous>  vasopressin Infusion 0.03 Unit(s)/Min (4.5 mL/Hr) IV Continuous <Continuous>    MEDICATIONS  (PRN):  HYDROmorphone  Injectable 0.25 milliGRAM(s) IV Push every 4 hours PRN Severe Pain (7 - 10)      PHYSICAL EXAM:  Constitutional: moves upper extremities   Respiratory: intubated and ventilator dependent  Abdomen: Soft, nondistended, pt moved upper extremities on abdominal palpation possibly 2/2 pain but unable to determine at this time  Extremities: LUE bruised from fingers to bicep area. RUE w/ bruising from fingers to forearm    LABS:                        8.8    13.53 )-----------( 132      ( 30 Dec 2022 09:12 )             26.8     12-    137  |  110<H>  |  75<H>  ----------------------------<  129<H>  4.8   |  16<L>  |  3.11<H>    Ca    7.8<L>      30 Dec 2022 09:12  Phos  4.8     12-  Mg     2.2     12    TPro  5.3<L>  /  Alb  3.0<L>  /  TBili  1.1  /  DBili  x   /  AST  26  /  ALT  15  /  AlkPhos  78  12-    PT/INR - ( 30 Dec 2022 01:05 )   PT: 15.7 sec;   INR: 1.35 ratio         PTT - ( 30 Dec 2022 01:05 )  PTT:35.5 sec  LIVER FUNCTIONS - ( 29 Dec 2022 07:39 )  Alb: 3.0 g/dL / Pro: 5.3 g/dL / ALK PHOS: 78 U/L / ALT: 15 U/L / AST: 26 U/L / GGT: x           Urinalysis Basic - ( 29 Dec 2022 09:07 )    Color: Light Yellow / Appearance: Clear / S.016 / pH: x  Gluc: x / Ketone: Negative  / Bili: Negative / Urobili: Negative   Blood: x / Protein: Trace / Nitrite: Negative   Leuk Esterase: Negative / RBC: 9 /hpf / WBC 1 /HPF   Sq Epi: x / Non Sq Epi: 1 /hpf / Bacteria: Negative          IMAGING:     Trauma Surgery Progress Note  Patient is a 89y old  Male who presents with a chief complaint of Pt is a 89 yo M with PMH: congenital deafness (communicates with ASL), CAD, s/p triple vessel CABG, s/p biventricular PPM, HTN, CKD stage IV, BPH, gout, anemia. Presented as a level 1 trauma after being found down outside. Diagnosed with COVID19 on . In the ED, mental status was progressively worsening, so intubated for airway protection. Pt was also hypothermic and hypotensive. Vasopressor support initiated, R femoral arterial line placed and central line placed. Imaging revealed bilateral nasal bone and nasal septum fractures. S/P laceration repair . Pt admitted to SICU for shock, ?septic/hypothermic. CT head negative for any acute pathology.      (30 Dec 2022 07:02)      INTERVAL EVENTS:   - patient is slowly waking up, remain intubated  -coming down slowly on levophed drip  -VTE ppx: HSQ  -PPM interrogated: Presenting rhythm: AFL-VPaced  Underlying rhythm: AFL w/ CHB, Pt is pacemaker dependent.  - POCUS- euvolemic, hypokinetic      SUBJECTIVE: Patient seen and examined at bedside with surgical team. Pt intubated and sedated, mildly arousable to touch. Pt requiring both natacha and vaso for pressure support.    OBJECTIVE:    Vital Signs Last 24 Hrs  T(C): 36.2 (30 Dec 2022 11:00), Max: 37.4 (29 Dec 2022 18:00)  T(F): 97.2 (30 Dec 2022 11:00), Max: 99.3 (29 Dec 2022 18:00)  HR: 63 (30 Dec 2022 12:30) (59 - 99)  BP: 112/57 (30 Dec 2022 07:00) (112/57 - 122/59)  BP(mean): 85 (30 Dec 2022 00:15) (83 - 85)  RR: 16 (30 Dec 2022 12:30) (11 - 25)  SpO2: 99% (30 Dec 2022 12:30) (95% - 100%)    Parameters below as of 30 Dec 2022 07:00  Patient On (Oxygen Delivery Method): ventilator    O2 Concentration (%): 30I&O's Detail    29 Dec 2022 07:01  -  30 Dec 2022 07:00  --------------------------------------------------------  IN:    Dexmedetomidine: 7.5 mL    IV PiggyBack: 1100 mL    IV PiggyBack: 300 mL    Norepinephrine: 663.9 mL    PRBCs (Packed Red Blood Cells): 600 mL    sodium chloride 0.9%: 1650 mL    Vasopressin: 81 mL  Total IN: 4402.4 mL    OUT:    Indwelling Catheter - Urethral (mL): 760 mL  Total OUT: 760 mL    Total NET: 3642.4 mL      30 Dec 2022 07:01  -  30 Dec 2022 13:17  --------------------------------------------------------  IN:    Dexmedetomidine: 3 mL    IV PiggyBack: 100 mL    Norepinephrine: 60.9 mL    sodium chloride 0.9%: 375 mL    Vasopressin: 22.5 mL  Total IN: 561.4 mL    OUT:    Indwelling Catheter - Urethral (mL): 105 mL  Total OUT: 105 mL    Total NET: 456.4 mL      MEDICATIONS  (STANDING):  acetaminophen   IVPB .. 1000 milliGRAM(s) IV Intermittent every 6 hours  artificial  tears Solution 1 Drop(s) Both EYES four times a day  bacitracin   Ointment 1 Application(s) Topical two times a day  chlorhexidine 0.12% Liquid 15 milliLiter(s) Oral Mucosa every 12 hours  chlorhexidine 2% Cloths 1 Application(s) Topical <User Schedule>  dexMEDEtomidine Infusion 0.2 MICROgram(s)/kG/Hr (3 mL/Hr) IV Continuous <Continuous>  DOBUTamine Infusion 2.5 MICROgram(s)/kG/Min (4.5 mL/Hr) IV Continuous <Continuous>  enoxaparin Injectable 30 milliGRAM(s) SubCutaneous every 24 hours  insulin lispro (ADMELOG) corrective regimen sliding scale   SubCutaneous every 6 hours  meropenem  IVPB 500 milliGRAM(s) IV Intermittent every 12 hours  norepinephrine Infusion 0.19 MICROgram(s)/kG/Min (21.4 mL/Hr) IV Continuous <Continuous>  pantoprazole  Injectable 40 milliGRAM(s) IV Push daily  sodium bicarbonate  Infusion 0.075 mEq/kG/Hr (30 mL/Hr) IV Continuous <Continuous>  sodium chloride 0.65% Nasal 1 Spray(s) Both Nostrils daily  sodium chloride 0.9%. 1000 milliLiter(s) (75 mL/Hr) IV Continuous <Continuous>  vasopressin Infusion 0.03 Unit(s)/Min (4.5 mL/Hr) IV Continuous <Continuous>    MEDICATIONS  (PRN):  HYDROmorphone  Injectable 0.25 milliGRAM(s) IV Push every 4 hours PRN Severe Pain (7 - 10)      PHYSICAL EXAM:  Constitutional: moves upper extremities   HEENT: face with multiple abrasions and dried blood diffusely, ecchymosis from nasal fracture  Respiratory: intubated and ventilator dependent  Abdomen: Soft, nondistended, pt moved upper extremities on abdominal palpation possibly 2/2 pain but unable to determine at this time  Extremities: LUE bruised from fingers to bicep area. RUE w/ bruising from fingers to forearm    LABS:                        8.8    13.53 )-----------( 132      ( 30 Dec 2022 09:12 )             26.8     12-30    137  |  110<H>  |  75<H>  ----------------------------<  129<H>  4.8   |  16<L>  |  3.11<H>    Ca    7.8<L>      30 Dec 2022 09:12  Phos  4.8     12-30  Mg     2.2     12-30    TPro  5.3<L>  /  Alb  3.0<L>  /  TBili  1.1  /  DBili  x   /  AST  26  /  ALT  15  /  AlkPhos  78  12-29    PT/INR - ( 30 Dec 2022 01:05 )   PT: 15.7 sec;   INR: 1.35 ratio         PTT - ( 30 Dec 2022 01:05 )  PTT:35.5 sec  LIVER FUNCTIONS - ( 29 Dec 2022 07:39 )  Alb: 3.0 g/dL / Pro: 5.3 g/dL / ALK PHOS: 78 U/L / ALT: 15 U/L / AST: 26 U/L / GGT: x           Urinalysis Basic - ( 29 Dec 2022 09:07 )    Color: Light Yellow / Appearance: Clear / S.016 / pH: x  Gluc: x / Ketone: Negative  / Bili: Negative / Urobili: Negative   Blood: x / Protein: Trace / Nitrite: Negative   Leuk Esterase: Negative / RBC: 9 /hpf / WBC 1 /HPF   Sq Epi: x / Non Sq Epi: 1 /hpf / Bacteria: Negative          IMAGING:

## 2022-12-30 NOTE — PROGRESS NOTE ADULT - SUBJECTIVE AND OBJECTIVE BOX
24 HOUR EVENTS:      HISTORY        SUBJECTIVE/ROS:  [ ] A ten-point review of systems was otherwise negative except as noted.  [x ] Due to altered mental status/intubation, subjective information were not able to be obtained from the patient. History was obtained, to the extent possible, from review of the chart and collateral sources of information.      NEURO  Exam: awake, alert, oriented  Meds: acetaminophen   IVPB .. 750 milliGRAM(s) IV Intermittent every 6 hours    [x] Adequacy of sedation and pain control has been assessed and adjusted      RESPIRATORY  RR: 21 (12-30-22 @ 00:00) (15 - 26)  SpO2: 98% (12-30-22 @ 00:00) (85% - 100%)  Exam: unlabored, clear to auscultation bilaterally  Mechanical Ventilation: Mode: AC/ CMV (Assist Control/ Continuous Mandatory Ventilation), RR (machine): 16, RR (patient): 16, TV (machine): 400, FiO2: 30, PEEP: 5, ITime: 0.9, MAP: 7.5, PIP: 16  ABG - ( 29 Dec 2022 20:56 )  pH: 7.28  /  pCO2: 37    /  pO2: 127   / HCO3: 17    / Base Excess: -8.6  /  SaO2: 99.1      [N/A] Extubation Readiness Assessed      CARDIOVASCULAR  HR: 60 (12-30-22 @ 00:00) (59 - 67)  BP: 115/59 (12-29-22 @ 19:30) (90/51 - 132/58)  BP(mean): 83 (12-29-22 @ 19:30) (70 - 83)  ABP: 116/47 (12-30-22 @ 00:00) (90/42 - 158/69)  ABP(mean): 70 (12-30-22 @ 00:00) (58 - 105)    Lactate, Blood: 4.7 mmol/L (12-29 @ 07:39)    Exam: regular rate and rhythm  Cardiac Rhythm: sinus  Perfusion     [x]Adequate   [ ]Inadequate  Mentation   [x]Normal       [ ]Reduced  Extremities  [x]Warm         [ ]Cool  Volume Status [ ]Hypervolemic [x]Euvolemic [ ]Hypovolemic  Meds: norepinephrine Infusion 0.19 MICROgram(s)/kG/Min IV Continuous <Continuous>        GI/NUTRITION  Exam: soft, nontender, nondistended, incision C/D/I  Diet: npo  Meds: pantoprazole  Injectable 40 milliGRAM(s) IV Push daily      GENITOURINARY  I&O's Detail    12-29 @ 07:01  -  12-30 @ 00:13  --------------------------------------------------------  IN:    IV PiggyBack: 300 mL    IV PiggyBack: 725 mL    Norepinephrine: 546.8 mL    PRBCs (Packed Red Blood Cells): 600 mL    sodium chloride 0.9%: 1125 mL    Vasopressin: 49.5 mL  Total IN: 3346.3 mL    OUT:    Indwelling Catheter - Urethral (mL): 600 mL  Total OUT: 600 mL    Total NET: 2746.3 mL        Weight (kg): 60 (12-29 @ 09:39)  12-29    136  |  108  |  75<H>  ----------------------------<  154<H>  4.9   |  18<L>  |  3.07<H>    Ca    7.8<L>      29 Dec 2022 21:08  Phos  5.9     12-29  Mg     2.4     12-29    TPro  5.3<L>  /  Alb  3.0<L>  /  TBili  1.1  /  DBili  x   /  AST  26  /  ALT  15  /  AlkPhos  78  12-29    [ ] Davis catheter, indication: N/A  Meds: sodium chloride 0.9%. 1000 milliLiter(s) IV Continuous <Continuous>        HEMATOLOGIC  Meds: heparin   Injectable 5000 Unit(s) SubCutaneous every 8 hours    [x] VTE Prophylaxis                        9.6    11.98 )-----------( 161      ( 29 Dec 2022 21:08 )             29.2     PT/INR - ( 29 Dec 2022 09:07 )   PT: 17.7 sec;   INR: 1.52 ratio         PTT - ( 29 Dec 2022 09:07 )  PTT:37.4 sec  Transfusion     [ ] PRBC   [ ] Platelets   [ ] FFP   [ ] Cryoprecipitate      INFECTIOUS DISEASES  WBC Count: 11.98 K/uL (12-29 @ 21:08)  WBC Count: 10.85 K/uL (12-29 @ 16:59)  WBC Count: 8.79 K/uL (12-29 @ 12:31)  WBC Count: 10.81 K/uL (12-29 @ 07:39)    RECENT CULTURES:  Specimen Source: Combi-Cath Combi-Cath  Date/Time: 12-29 @ 16:18  Culture Results: --  Gram Stain:   Rare polymorphonuclear leukocytes per low power field  No Squamous epithelial cells per low power field  No organisms seen  Organism: --    Meds: meropenem  IVPB 500 milliGRAM(s) IV Intermittent every 12 hours        ENDOCRINE  CAPILLARY BLOOD GLUCOSE      POCT Blood Glucose.: 144 mg/dL (29 Dec 2022 22:59)  POCT Blood Glucose.: 198 mg/dL (29 Dec 2022 17:24)  POCT Blood Glucose.: 248 mg/dL (29 Dec 2022 11:31)  POCT Blood Glucose.: 182 mg/dL (29 Dec 2022 07:30)    Meds: insulin lispro (ADMELOG) corrective regimen sliding scale   SubCutaneous every 6 hours  vasopressin Infusion 0.03 Unit(s)/Min IV Continuous <Continuous>        ACCESS DEVICES:  [ ] Peripheral IV  [ ] Central Venous Line	[ ] R	[ ] L	[ ] IJ	[ ] Fem	[ ] SC	Placed:   [ ] Arterial Line		[ ] R	[ ] L	[ ] Fem	[ ] Rad	[ ] Ax	Placed:   [ ] PICC:					[ ] Mediport  [ ] Urinary Catheter, Date Placed:   [x] Necessity of urinary, arterial, and venous catheters discussed    OTHER MEDICATIONS:  artificial  tears Solution 1 Drop(s) Both EYES four times a day  chlorhexidine 0.12% Liquid 15 milliLiter(s) Oral Mucosa every 12 hours  chlorhexidine 2% Cloths 1 Application(s) Topical <User Schedule>      CODE STATUS:      IMAGING:   24 HOUR EVENTS:  - patient is slowly waking up, remain intubated  -coming down slowly on levophed drip  -VTE ppx: HSQ  -PPM interrogated: Presenting rhythm: AFL-VPaced  Underlying rhythm: AFL w/ CHB, Pt is pacemaker dependent.     HISTORY  Patient is a 89 y/o male w/ a PMHx of congenital deafness (communicates w/ ASL), CAD s/p triple vessel CABG, s/p biventricular PPM, HTN, HLD, CKD stage IV, BPH, gout, and anemia who presented as a level 1 trauma after being found down outside. As per the son, patient went to bed around 2130 yesterday and was found down outside around 0600 w/ his walker. There are two steps that lead outdoor. Of note, son states that he is normally A&Ox4 but had been intermittently lethargic & confused after he was found to be COVID-19 positive on 12/25. In the ED, GCS was 10 but mental status was progressively worsening so he was intubated for airway protection. He was also hypothermic to 25° C and hypotensive w/ BP in the 60s/40s that improved to the 110s/70s after a liter bolus of warm crystalloid but he became hypotensive again so vasopressor support was initiated and a right femoral arterial line was placed.       SUBJECTIVE/ROS:  [ ] A ten-point review of systems was otherwise negative except as noted.  [x ] Due to altered mental status/intubation, subjective information were not able to be obtained from the patient. History was obtained, to the extent possible, from review of the chart and collateral sources of information.      NEURO  Exam: intubated, slowly moving extr  Meds: acetaminophen   IVPB .. 750 milliGRAM(s) IV Intermittent every 6 hours    [x] Adequacy of sedation and pain control has been assessed and adjusted      RESPIRATORY  RR: 21 (12-30-22 @ 00:00) (15 - 26)  SpO2: 98% (12-30-22 @ 00:00) (85% - 100%)  Exam: unlabored, clear to auscultation bilaterally  Mechanical Ventilation: Mode: AC/ CMV (Assist Control/ Continuous Mandatory Ventilation), RR (machine): 16, RR (patient): 16, TV (machine): 400, FiO2: 30, PEEP: 5, ITime: 0.9, MAP: 7.5, PIP: 16  ABG - ( 29 Dec 2022 20:56 )  pH: 7.28  /  pCO2: 37    /  pO2: 127   / HCO3: 17    / Base Excess: -8.6  /  SaO2: 99.1      [N/A] Extubation Readiness Assessed      CARDIOVASCULAR  HR: 60 (12-30-22 @ 00:00) (59 - 67)  BP: 115/59 (12-29-22 @ 19:30) (90/51 - 132/58)  BP(mean): 83 (12-29-22 @ 19:30) (70 - 83)  ABP: 116/47 (12-30-22 @ 00:00) (90/42 - 158/69)  ABP(mean): 70 (12-30-22 @ 00:00) (58 - 105)    Lactate, Blood: 4.7 mmol/L (12-29 @ 07:39)    Exam: regular rate and rhythm  Cardiac Rhythm: sinus  Perfusion     [x]Adequate   [ ]Inadequate  Mentation   [x]Normal       [ ]Reduced  Extremities  [x]Warm         [ ]Cool  Volume Status [ ]Hypervolemic [x]Euvolemic [ ]Hypovolemic  Meds: norepinephrine Infusion 0.19 MICROgram(s)/kG/Min IV Continuous <Continuous>        GI/NUTRITION  Exam: soft, nontender, nondistended, incision C/D/I  Diet: npo  Meds: pantoprazole  Injectable 40 milliGRAM(s) IV Push daily      GENITOURINARY  I&O's Detail    12-29 @ 07:01  -  12-30 @ 00:13  --------------------------------------------------------  IN:    IV PiggyBack: 300 mL    IV PiggyBack: 725 mL    Norepinephrine: 546.8 mL    PRBCs (Packed Red Blood Cells): 600 mL    sodium chloride 0.9%: 1125 mL    Vasopressin: 49.5 mL  Total IN: 3346.3 mL    OUT:    Indwelling Catheter - Urethral (mL): 600 mL  Total OUT: 600 mL    Total NET: 2746.3 mL        Weight (kg): 60 (12-29 @ 09:39)  12-29    136  |  108  |  75<H>  ----------------------------<  154<H>  4.9   |  18<L>  |  3.07<H>    Ca    7.8<L>      29 Dec 2022 21:08  Phos  5.9     12-29  Mg     2.4     12-29    TPro  5.3<L>  /  Alb  3.0<L>  /  TBili  1.1  /  DBili  x   /  AST  26  /  ALT  15  /  AlkPhos  78  12-29    [ ] Davis catheter, indication: N/A  Meds: sodium chloride 0.9%. 1000 milliLiter(s) IV Continuous <Continuous>        HEMATOLOGIC  Meds: heparin   Injectable 5000 Unit(s) SubCutaneous every 8 hours    [x] VTE Prophylaxis                        9.6    11.98 )-----------( 161      ( 29 Dec 2022 21:08 )             29.2     PT/INR - ( 29 Dec 2022 09:07 )   PT: 17.7 sec;   INR: 1.52 ratio         PTT - ( 29 Dec 2022 09:07 )  PTT:37.4 sec  Transfusion     [ ] PRBC   [ ] Platelets   [ ] FFP   [ ] Cryoprecipitate      INFECTIOUS DISEASES  WBC Count: 11.98 K/uL (12-29 @ 21:08)  WBC Count: 10.85 K/uL (12-29 @ 16:59)  WBC Count: 8.79 K/uL (12-29 @ 12:31)  WBC Count: 10.81 K/uL (12-29 @ 07:39)    RECENT CULTURES:  Specimen Source: Combi-Cath Combi-Cath  Date/Time: 12-29 @ 16:18  Culture Results: --  Gram Stain:   Rare polymorphonuclear leukocytes per low power field  No Squamous epithelial cells per low power field  No organisms seen  Organism: --    Meds: meropenem  IVPB 500 milliGRAM(s) IV Intermittent every 12 hours        ENDOCRINE  CAPILLARY BLOOD GLUCOSE      POCT Blood Glucose.: 144 mg/dL (29 Dec 2022 22:59)  POCT Blood Glucose.: 198 mg/dL (29 Dec 2022 17:24)  POCT Blood Glucose.: 248 mg/dL (29 Dec 2022 11:31)  POCT Blood Glucose.: 182 mg/dL (29 Dec 2022 07:30)    Meds: insulin lispro (ADMELOG) corrective regimen sliding scale   SubCutaneous every 6 hours  vasopressin Infusion 0.03 Unit(s)/Min IV Continuous <Continuous>        ACCESS DEVICES:  [ ] Peripheral IV  [ ] Central Venous Line	[ ] R	[ ] L	[ ] IJ	[ ] Fem	[ ] SC	Placed:   [ ] Arterial Line		[ ] R	[ ] L	[ ] Fem	[ ] Rad	[ ] Ax	Placed:   [ ] PICC:					[ ] Mediport  [ ] Urinary Catheter, Date Placed:   [x] Necessity of urinary, arterial, and venous catheters discussed    OTHER MEDICATIONS:  artificial  tears Solution 1 Drop(s) Both EYES four times a day  chlorhexidine 0.12% Liquid 15 milliLiter(s) Oral Mucosa every 12 hours  chlorhexidine 2% Cloths 1 Application(s) Topical <User Schedule>      CODE STATUS:      IMAGING:   24 HOUR EVENTS:  - patient is slowly waking up, remain intubated  -coming down slowly on levophed drip  -VTE ppx: HSQ  -PPM interrogated: Presenting rhythm: AFL-VPaced  Underlying rhythm: AFL w/ CHB, Pt is pacemaker dependent.  - POCUS- euvolemic, hypokinetic      HISTORY  Patient is a 89 y/o male w/ a PMHx of congenital deafness (communicates w/ ASL), CAD s/p triple vessel CABG, s/p biventricular PPM, HTN, HLD, CKD stage IV, BPH, gout, and anemia who presented as a level 1 trauma after being found down outside. As per the son, patient went to bed around 2130 yesterday and was found down outside around 0600 w/ his walker. There are two steps that lead outdoor. Of note, son states that he is normally A&Ox4 but had been intermittently lethargic & confused after he was found to be COVID-19 positive on 12/25. In the ED, GCS was 10 but mental status was progressively worsening so he was intubated for airway protection. He was also hypothermic to 25° C and hypotensive w/ BP in the 60s/40s that improved to the 110s/70s after a liter bolus of warm crystalloid but he became hypotensive again so vasopressor support was initiated and a right femoral arterial line was placed.       SUBJECTIVE/ROS:  [ ] A ten-point review of systems was otherwise negative except as noted.  [x ] Due to altered mental status/intubation, subjective information were not able to be obtained from the patient. History was obtained, to the extent possible, from review of the chart and collateral sources of information.      NEURO  Exam: intubated, slowly moving extr  Meds: acetaminophen   IVPB .. 750 milliGRAM(s) IV Intermittent every 6 hours    [x] Adequacy of sedation and pain control has been assessed and adjusted      RESPIRATORY  RR: 21 (12-30-22 @ 00:00) (15 - 26)  SpO2: 98% (12-30-22 @ 00:00) (85% - 100%)  Exam: unlabored, clear to auscultation bilaterally  Mechanical Ventilation: Mode: AC/ CMV (Assist Control/ Continuous Mandatory Ventilation), RR (machine): 16, RR (patient): 16, TV (machine): 400, FiO2: 30, PEEP: 5, ITime: 0.9, MAP: 7.5, PIP: 16  ABG - ( 29 Dec 2022 20:56 )  pH: 7.28  /  pCO2: 37    /  pO2: 127   / HCO3: 17    / Base Excess: -8.6  /  SaO2: 99.1      [N/A] Extubation Readiness Assessed      CARDIOVASCULAR  HR: 60 (12-30-22 @ 00:00) (59 - 67)  BP: 115/59 (12-29-22 @ 19:30) (90/51 - 132/58)  BP(mean): 83 (12-29-22 @ 19:30) (70 - 83)  ABP: 116/47 (12-30-22 @ 00:00) (90/42 - 158/69)  ABP(mean): 70 (12-30-22 @ 00:00) (58 - 105)    Lactate, Blood: 4.7 mmol/L (12-29 @ 07:39)    Exam: regular rate and rhythm  Cardiac Rhythm: sinus  Perfusion     [x]Adequate   [ ]Inadequate  Mentation   [x]Normal       [ ]Reduced  Extremities  [x]Warm         [ ]Cool  Volume Status [ ]Hypervolemic [x]Euvolemic [ ]Hypovolemic  Meds: norepinephrine Infusion 0.19 MICROgram(s)/kG/Min IV Continuous <Continuous>        GI/NUTRITION  Exam: soft, nontender, nondistended, incision C/D/I  Diet: npo  Meds: pantoprazole  Injectable 40 milliGRAM(s) IV Push daily      GENITOURINARY  I&O's Detail    12-29 @ 07:01  -  12-30 @ 00:13  --------------------------------------------------------  IN:    IV PiggyBack: 300 mL    IV PiggyBack: 725 mL    Norepinephrine: 546.8 mL    PRBCs (Packed Red Blood Cells): 600 mL    sodium chloride 0.9%: 1125 mL    Vasopressin: 49.5 mL  Total IN: 3346.3 mL    OUT:    Indwelling Catheter - Urethral (mL): 600 mL  Total OUT: 600 mL    Total NET: 2746.3 mL        Weight (kg): 60 (12-29 @ 09:39)  12-29    136  |  108  |  75<H>  ----------------------------<  154<H>  4.9   |  18<L>  |  3.07<H>    Ca    7.8<L>      29 Dec 2022 21:08  Phos  5.9     12-29  Mg     2.4     12-29    TPro  5.3<L>  /  Alb  3.0<L>  /  TBili  1.1  /  DBili  x   /  AST  26  /  ALT  15  /  AlkPhos  78  12-29    [ ] Davis catheter, indication: N/A  Meds: sodium chloride 0.9%. 1000 milliLiter(s) IV Continuous <Continuous>        HEMATOLOGIC  Meds: heparin   Injectable 5000 Unit(s) SubCutaneous every 8 hours    [x] VTE Prophylaxis                        9.6    11.98 )-----------( 161      ( 29 Dec 2022 21:08 )             29.2     PT/INR - ( 29 Dec 2022 09:07 )   PT: 17.7 sec;   INR: 1.52 ratio         PTT - ( 29 Dec 2022 09:07 )  PTT:37.4 sec  Transfusion     [ ] PRBC   [ ] Platelets   [ ] FFP   [ ] Cryoprecipitate      INFECTIOUS DISEASES  WBC Count: 11.98 K/uL (12-29 @ 21:08)  WBC Count: 10.85 K/uL (12-29 @ 16:59)  WBC Count: 8.79 K/uL (12-29 @ 12:31)  WBC Count: 10.81 K/uL (12-29 @ 07:39)    RECENT CULTURES:  Specimen Source: Combi-Cath Combi-Cath  Date/Time: 12-29 @ 16:18  Culture Results: --  Gram Stain:   Rare polymorphonuclear leukocytes per low power field  No Squamous epithelial cells per low power field  No organisms seen  Organism: --    Meds: meropenem  IVPB 500 milliGRAM(s) IV Intermittent every 12 hours        ENDOCRINE  CAPILLARY BLOOD GLUCOSE      POCT Blood Glucose.: 144 mg/dL (29 Dec 2022 22:59)  POCT Blood Glucose.: 198 mg/dL (29 Dec 2022 17:24)  POCT Blood Glucose.: 248 mg/dL (29 Dec 2022 11:31)  POCT Blood Glucose.: 182 mg/dL (29 Dec 2022 07:30)    Meds: insulin lispro (ADMELOG) corrective regimen sliding scale   SubCutaneous every 6 hours  vasopressin Infusion 0.03 Unit(s)/Min IV Continuous <Continuous>        ACCESS DEVICES:  [ ] Peripheral IV  [ ] Central Venous Line	[ ] R	[ ] L	[ ] IJ	[ ] Fem	[ ] SC	Placed:   [ ] Arterial Line		[ ] R	[ ] L	[ ] Fem	[ ] Rad	[ ] Ax	Placed:   [ ] PICC:					[ ] Mediport  [ ] Urinary Catheter, Date Placed:   [x] Necessity of urinary, arterial, and venous catheters discussed    OTHER MEDICATIONS:  artificial  tears Solution 1 Drop(s) Both EYES four times a day  chlorhexidine 0.12% Liquid 15 milliLiter(s) Oral Mucosa every 12 hours  chlorhexidine 2% Cloths 1 Application(s) Topical <User Schedule>      CODE STATUS:      IMAGING:   24 HOUR EVENTS:  - patient is slowly waking up, remain intubated  -coming down slowly on levophed drip  - POCUS- euvolemic, hypokinetic  - dobutamine started  - f/u c-collar clearance    HISTORY  Patient is a 89 y/o male w/ a PMHx of congenital deafness (communicates w/ ASL), CAD s/p triple vessel CABG, s/p biventricular PPM, HTN, HLD, CKD stage IV, BPH, gout, and anemia who presented as a level 1 trauma after being found down outside. As per the son, patient went to bed around 2130 yesterday and was found down outside around 0600 w/ his walker. There are two steps that lead outdoor. Of note, son states that he is normally A&Ox4 but had been intermittently lethargic & confused after he was found to be COVID-19 positive on 12/25. In the ED, GCS was 10 but mental status was progressively worsening so he was intubated for airway protection. He was also hypothermic to 25° C and hypotensive w/ BP in the 60s/40s that improved to the 110s/70s after a liter bolus of warm crystalloid but he became hypotensive again so vasopressor support was initiated and a right femoral arterial line was placed.       SUBJECTIVE/ROS:  [ ] A ten-point review of systems was otherwise negative except as noted.  [x ] Due to altered mental status/intubation, subjective information were not able to be obtained from the patient. History was obtained, to the extent possible, from review of the chart and collateral sources of information.      NEURO  Exam: intubated, slowly moving extr  Meds: acetaminophen   IVPB .. 750 milliGRAM(s) IV Intermittent every 6 hours    [x] Adequacy of sedation and pain control has been assessed and adjusted      RESPIRATORY  RR: 21 (12-30-22 @ 00:00) (15 - 26)  SpO2: 98% (12-30-22 @ 00:00) (85% - 100%)  Exam: unlabored, clear to auscultation bilaterally  Mechanical Ventilation: Mode: AC/ CMV (Assist Control/ Continuous Mandatory Ventilation), RR (machine): 16, RR (patient): 16, TV (machine): 400, FiO2: 30, PEEP: 5, ITime: 0.9, MAP: 7.5, PIP: 16  ABG - ( 29 Dec 2022 20:56 )  pH: 7.28  /  pCO2: 37    /  pO2: 127   / HCO3: 17    / Base Excess: -8.6  /  SaO2: 99.1      [N/A] Extubation Readiness Assessed      CARDIOVASCULAR  HR: 60 (12-30-22 @ 00:00) (59 - 67)  BP: 115/59 (12-29-22 @ 19:30) (90/51 - 132/58)  BP(mean): 83 (12-29-22 @ 19:30) (70 - 83)  ABP: 116/47 (12-30-22 @ 00:00) (90/42 - 158/69)  ABP(mean): 70 (12-30-22 @ 00:00) (58 - 105)    Lactate, Blood: 4.7 mmol/L (12-29 @ 07:39)    Exam: regular rate and rhythm  Cardiac Rhythm: sinus  Perfusion     [x]Adequate   [ ]Inadequate  Mentation   [x]Normal       [ ]Reduced  Extremities  [x]Warm         [ ]Cool  Volume Status [ ]Hypervolemic [x]Euvolemic [ ]Hypovolemic  Meds: norepinephrine Infusion 0.19 MICROgram(s)/kG/Min IV Continuous <Continuous>        GI/NUTRITION  Exam: soft, nontender, nondistended, incision C/D/I  Diet: npo  Meds: pantoprazole  Injectable 40 milliGRAM(s) IV Push daily      GENITOURINARY  I&O's Detail    12-29 @ 07:01  -  12-30 @ 00:13  --------------------------------------------------------  IN:    IV PiggyBack: 300 mL    IV PiggyBack: 725 mL    Norepinephrine: 546.8 mL    PRBCs (Packed Red Blood Cells): 600 mL    sodium chloride 0.9%: 1125 mL    Vasopressin: 49.5 mL  Total IN: 3346.3 mL    OUT:    Indwelling Catheter - Urethral (mL): 600 mL  Total OUT: 600 mL    Total NET: 2746.3 mL        Weight (kg): 60 (12-29 @ 09:39)  12-29    136  |  108  |  75<H>  ----------------------------<  154<H>  4.9   |  18<L>  |  3.07<H>    Ca    7.8<L>      29 Dec 2022 21:08  Phos  5.9     12-29  Mg     2.4     12-29    TPro  5.3<L>  /  Alb  3.0<L>  /  TBili  1.1  /  DBili  x   /  AST  26  /  ALT  15  /  AlkPhos  78  12-29    [ ] Davis catheter, indication: N/A  Meds: sodium chloride 0.9%. 1000 milliLiter(s) IV Continuous <Continuous>        HEMATOLOGIC  Meds: heparin   Injectable 5000 Unit(s) SubCutaneous every 8 hours    [x] VTE Prophylaxis                        9.6    11.98 )-----------( 161      ( 29 Dec 2022 21:08 )             29.2     PT/INR - ( 29 Dec 2022 09:07 )   PT: 17.7 sec;   INR: 1.52 ratio         PTT - ( 29 Dec 2022 09:07 )  PTT:37.4 sec  Transfusion     [ ] PRBC   [ ] Platelets   [ ] FFP   [ ] Cryoprecipitate      INFECTIOUS DISEASES  WBC Count: 11.98 K/uL (12-29 @ 21:08)  WBC Count: 10.85 K/uL (12-29 @ 16:59)  WBC Count: 8.79 K/uL (12-29 @ 12:31)  WBC Count: 10.81 K/uL (12-29 @ 07:39)    RECENT CULTURES:  Specimen Source: Combi-Cath Combi-Cath  Date/Time: 12-29 @ 16:18  Culture Results: --  Gram Stain:   Rare polymorphonuclear leukocytes per low power field  No Squamous epithelial cells per low power field  No organisms seen  Organism: --    Meds: meropenem  IVPB 500 milliGRAM(s) IV Intermittent every 12 hours        ENDOCRINE  CAPILLARY BLOOD GLUCOSE      POCT Blood Glucose.: 144 mg/dL (29 Dec 2022 22:59)  POCT Blood Glucose.: 198 mg/dL (29 Dec 2022 17:24)  POCT Blood Glucose.: 248 mg/dL (29 Dec 2022 11:31)  POCT Blood Glucose.: 182 mg/dL (29 Dec 2022 07:30)    Meds: insulin lispro (ADMELOG) corrective regimen sliding scale   SubCutaneous every 6 hours  vasopressin Infusion 0.03 Unit(s)/Min IV Continuous <Continuous>        ACCESS DEVICES:  [ ] Peripheral IV  [ ] Central Venous Line	[ ] R	[ ] L	[ ] IJ	[ ] Fem	[ ] SC	Placed:   [ ] Arterial Line		[ ] R	[ ] L	[ ] Fem	[ ] Rad	[ ] Ax	Placed:   [ ] PICC:					[ ] Mediport  [ ] Urinary Catheter, Date Placed:   [x] Necessity of urinary, arterial, and venous catheters discussed    OTHER MEDICATIONS:  artificial  tears Solution 1 Drop(s) Both EYES four times a day  chlorhexidine 0.12% Liquid 15 milliLiter(s) Oral Mucosa every 12 hours  chlorhexidine 2% Cloths 1 Application(s) Topical <User Schedule>      CODE STATUS:      IMAGING:

## 2022-12-30 NOTE — DIETITIAN INITIAL EVALUATION ADULT - [id number]_GEN_A_CORE
-No documented BM's recorded thus far. Not on apparent bowel regimen at this time. Audible bowel sounds noted as per nursing flow sheet.  -Continues on IVF NaCl 0.9%, infusing at 75ml/hr for hydration.   -Continues on antibiotics as prescribed.   -Remains sedated, on pressors. Receiving Levophed (infusing at 13.5ml/hr at time of RD visit) and Vasopressin (infusing at 4.5ml/hr at time of RD visit); Precedex.   -Continues on insulin lispro sliding scale to aid in management of BG.

## 2022-12-30 NOTE — DIETITIAN INITIAL EVALUATION ADULT - ETIOLOGY
increased physiological demand of nutrient in setting of s/p trauma, infection complex clinical course; unable to place NGT due to significant edema of soft palate

## 2022-12-30 NOTE — DIETITIAN INITIAL EVALUATION ADULT - PERTINENT MEDS FT
MEDICATIONS  (STANDING):  artificial  tears Solution 1 Drop(s) Both EYES four times a day  bacitracin   Ointment 1 Application(s) Topical two times a day  chlorhexidine 0.12% Liquid 15 milliLiter(s) Oral Mucosa every 12 hours  chlorhexidine 2% Cloths 1 Application(s) Topical <User Schedule>  dexMEDEtomidine Infusion 0.2 MICROgram(s)/kG/Hr (3 mL/Hr) IV Continuous <Continuous>  heparin   Injectable 5000 Unit(s) SubCutaneous every 8 hours  insulin lispro (ADMELOG) corrective regimen sliding scale   SubCutaneous every 6 hours  meropenem  IVPB 500 milliGRAM(s) IV Intermittent every 12 hours  norepinephrine Infusion 0.19 MICROgram(s)/kG/Min (21.4 mL/Hr) IV Continuous <Continuous>  pantoprazole  Injectable 40 milliGRAM(s) IV Push daily  sodium chloride 0.65% Nasal 1 Spray(s) Both Nostrils daily  sodium chloride 0.9%. 1000 milliLiter(s) (75 mL/Hr) IV Continuous <Continuous>  vasopressin Infusion 0.03 Unit(s)/Min (4.5 mL/Hr) IV Continuous <Continuous>    MEDICATIONS  (PRN):

## 2022-12-30 NOTE — CHART NOTE - NSCHARTNOTEFT_GEN_A_CORE
Plastic surgery attending Dr. Emilie Day consulted for nasal bone fracture and multiple facial lacerations on 12/29. Case and imaging were reviewed by Dr. Day and recommendations were relayed to SICU team as follows:      HPI:   89 year old male history of CAD, s/p CABG, PPM, HTN, CKD 4, HLD, gout, upper GI bleed 2/2022, recent COVID 12/25/22, presenting as a level 1 trauma activation after being found down on the ground in his backyard by his son. Son states he found his father next to his walker unresponsive. Patient arrived hypothermic to 78F, hypotensive to 60/40, responded to 110's/70's with warm IVF resuscitation, HR 70's, O2 100%, GCS 10 (4,2,4)  Secondary Survey:   General: Altered, elderly male  HEENT: Dry blood throughout scalp and face, 3cm full-thickness laceration above left eyebrow, L eye periorbital ecchymosis, L nare laceration, dry blood in both nares and on tongue, no active bleeding anywhere, EOMI, PEERL.  Neck: Soft, midline trachea.  Chest: No chest wall deformities.   Cardiac: S1, S2, RRR  Respiratory: Bilateral breath sounds, clear and equal bilaterally  Abdomen: Soft, non-distended, no masses palpated  Groin: Normal appearing  Ext: palp brachial b/l UE, b/l femoral palp, cannot assess motor and sensory function due to AMS  Back: no palpable stepoff/deformity  Rectal: No fe blood        General: NAD  Neuro: Sedated  HEENT: L periorbital edema and ecchymosis, facial lacerations of forehead, nose and lip dry and intact  Respiratory: Intubated  CVS: Regular rate and rhythm  Extremities: No edema, well-perfused      ____________________________________________  RADIOLOGY:  CT maxillofacial: Comminuted bilateral nasal bone fractures and fracture   of the bony nasal septum. Large left periorbital swelling and hematoma.   The bony orbits and intraocular contents are intact. No retrobulbar   hematoma. There is a nonspecific 0.9 cm nodular lesion along the lateral   aspect of the left inferior rectus muscle. Further evaluation may be   obtained with nonemergent contrast-enhanced MRI of the orbits if no   contraindication.      A/P: 89 year old male history of CAD, s/p CABG, PPM, HTN, CKD 4, HLD, gout, upper GI bleed 2/2022, recent COVID 12/25/22, presenting as a level 1 trauma activation after being found down for unknown amount of time, presented with isolated comminuted bilateral nasal bone fractures, fracture of bony nasal septum, and facial lacerations of the right brow, nasal dorsum, and upper lip. Currently intubated/sedated and on pressor support.   - Nonop management of nasal bone fractures  - Facial lacerations closed bedside with 5-0 Nylon  - Bacitracin BID to affected areas  - Saline nasal spray daily  - f/u outpatient with Dr. Day Plastic surgery attending Dr. Emilie Day consulted for nasal bone fracture and multiple facial lacerations on 12/29. Case and imaging were reviewed by Dr. Day and recommendations were relayed to SICU team as follows:      HPI:   89 year old male history of CAD, s/p CABG, PPM, HTN, CKD 4, HLD, gout, upper GI bleed 2/2022, recent COVID 12/25/22, presenting as a level 1 trauma activation after being found down on the ground in his backyard by his son. Son states he found his father next to his walker unresponsive. Patient arrived hypothermic to 78F, hypotensive to 60/40, responded to 110's/70's with warm IVF resuscitation, HR 70's, O2 100%, GCS 10 (4,2,4)  Secondary Survey:   General: Altered, elderly male  HEENT: Dry blood throughout scalp and face, 3cm full-thickness laceration above left eyebrow, L eye periorbital ecchymosis, L nare laceration, dry blood in both nares and on tongue, no active bleeding anywhere, EOMI, PEERL.  Neck: Soft, midline trachea.  Chest: No chest wall deformities.   Cardiac: S1, S2, RRR  Respiratory: Bilateral breath sounds, clear and equal bilaterally  Abdomen: Soft, non-distended, no masses palpated  Groin: Normal appearing  Ext: palp brachial b/l UE, b/l femoral palp, cannot assess motor and sensory function due to AMS  Back: no palpable stepoff/deformity  Rectal: No fe blood        General: NAD  Neuro: Sedated  HEENT: L periorbital edema and ecchymosis, facial lacerations of forehead, nose and lip dry and intact  Respiratory: Intubated  CVS: Regular rate and rhythm  Extremities: No edema, well-perfused      ____________________________________________  RADIOLOGY:  CT maxillofacial: Comminuted bilateral nasal bone fractures and fracture   of the bony nasal septum. Large left periorbital swelling and hematoma.   The bony orbits and intraocular contents are intact. No retrobulbar   hematoma. There is a nonspecific 0.9 cm nodular lesion along the lateral   aspect of the left inferior rectus muscle. Further evaluation may be   obtained with nonemergent contrast-enhanced MRI of the orbits if no   contraindication.      A/P: 89 year old male history of CAD, s/p CABG, PPM, HTN, CKD 4, HLD, gout, upper GI bleed 2/2022, recent COVID 12/25/22, presenting as a level 1 trauma activation after being found down for unknown amount of time, presented with isolated comminuted bilateral nasal bone fractures, fracture of bony nasal septum, and facial lacerations of the right brow, nasal dorsum, and upper lip. Currently intubated/sedated and on pressor support.   - Nonop management of nasal bone fractures  - Facial lacerations closed bedside with 5-0 Nylon  - Bacitracin BID to affected areas  - Saline nasal spray daily  - f/u outpatient with Dr. Day    Spoke to the resident regarding patient presentation. Agree with the above findings and plan was discussed with the resident. Follow up at an outpatient for suture removal and a wound check   Nasal precautions and ABX on discharge

## 2022-12-30 NOTE — DIETITIAN INITIAL EVALUATION ADULT - OTHER INFO
Adama Sarmiento [id number] requested last op note for Mr Chelsea Parks op note dated 1/30/18 & discharge summary dated 2/6/18 to (f) [phone], confirmation received. Mr Roman Elizabeth has follow up appt on 2/28/18.
Dosing wt (12/29): 132 lbs  Wt trend (per Min GARZA): (8/18/21): 132.1 lbs; (7/22/21): 135 lbs; (4/12/21): 132.8 lbs

## 2022-12-31 NOTE — PROGRESS NOTE ADULT - ATTENDING COMMENTS
plastic surgical consultation noted - no surgical intervention needed for nasal bone fractures  focus of care at this stage is management of ventilator

## 2022-12-31 NOTE — PROGRESS NOTE ADULT - ASSESSMENT
Patient is a 89 y/o male w/ a PMHx of congenital deafness (communicates w/ ASL), CAD s/p triple vessel CABG, s/p biventricular PPM, HTN, HLD, CKD stage IV, BPH, gout, and anemia presenting after being found down outside hypothermic, hypotensive, and altered requiring intubation AMS w/ imaging revealing bilateral nasal bone and nasal septum fractures. Patient admits to SICU for shock, ?septic, hypothermic.     Neuro: altered mental status, intubated  -MS: opens eyes to touch, moves extr  - CT head negative for any acute pathology   - Multimodal pain control w/ standing IV acetaminophen and low dose PRN Dilaudid IVP    Resp: intubated for altered mental status, bilateral pleural effusions  - Mechanical ventilation for as patient was intubated for altered mental status  - Will SBT w/ goal to extubate when patient is awake & able to follow commands      CV: Shock, distributive? vs cardiogenic  - Will wean vasopressor support w/ goal MAP > 65 mmHg: levo and vaso  - Patient w/ significant bleeding from facial lacerations so will transfuse 2 units of PRBCs  -added dobutamine drip for Svo2 of 66%, and echo showed Severe segmental left ventricular systolic dysfunction.   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   -Lactate is cleared    GI: no acute issues  - NPO  - Unable to place OGT as patient has significant edema of the soft palate  - Protonix for stress ulcer prophylaxis    Renal: Acute on CKD stage IV (baseline Cr 2-3)  - NS at 75 mL/hr while NPO  - s/p 1L fluid and 2 units of blood   - bicarb gtt started for acidosis    Heme:   - Monitor CBC and coags  - VTE ppx: HSQ  -SCDs    ID: COVID-19 infection  - Will not give remdesivir for now given patient's LUCY  - currenlty on meropenem: emp  -cultures sent, UA neg, one bottle G+cocci in chains and pair, received 1d ose of vanco  -repeat blood cultures  - daily vanc levels    Endo: hyperglycemia  - Monitor glucose w/ ISS q6hrs for glycemic control  - HgbA1C 5.6%    MSK: bilateral nasal bone & nasal septum fracture, facial lacerations  - Outpatient follow-up w/ plastics  - Sinus precautions  - Facial lacerations repaired w/ Nylon sutures on 12/29    GOC:  -full code   Patient is a 89 y/o male w/ a PMHx of congenital deafness (communicates w/ ASL), CAD s/p triple vessel CABG, s/p biventricular PPM, HTN, HLD, CKD stage IV, BPH, gout, and anemia presenting after being found down outside hypothermic, hypotensive, and altered requiring intubation AMS w/ imaging revealing bilateral nasal bone and nasal septum fractures. Patient admits to SICU for shock, ?septic, hypothermic.     Neuro: altered mental status, intubated  -MS: opens eyes to touch, moves extr  - CT head negative for any acute pathology   - Multimodal pain control w/ standing IV acetaminophen and low dose PRN Dilaudid IVP    Resp: intubated for altered mental status, bilateral pleural effusions  - Mechanical ventilation for as patient was intubated for altered mental status  - Will SBT w/ goal to extubate when patient is awake & able to follow commands      CV: Shock, distributive? vs cardiogenic  - Will wean vasopressor support w/ goal MAP > 65 mmHg: levo and vaso  - Patient w/ significant bleeding from facial lacerations so will transfuse 2 units of PRBCs  -added dobutamine drip for Svo2 of 66%, and echo showed Severe segmental left ventricular systolic dysfunction.   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   -Lactate is cleared    GI: no acute issues  - NPO  - Unable to place OGT as patient has significant edema of the soft palate  - Protonix for stress ulcer prophylaxis    Renal: Acute on CKD stage IV (baseline Cr 2-3)  - s/p 1L fluid and 2 units of blood   - bicarb gtt 150mEq @50cc/hr started for acidosis    Heme:   - Monitor CBC and coags  - VTE ppx: HSQ  -SCDs    ID: COVID-19 infection  - Will not give remdesivir for now given patient's LUCY  - currenlty on meropenem: emp  -cultures sent, UA neg, one bottle G+cocci in chains and pair, received 1d ose of vanco  -repeat blood cultures  - daily vanc levels    Endo: hyperglycemia  - Monitor glucose w/ ISS q6hrs for glycemic control  - HgbA1C 5.6%    MSK: bilateral nasal bone & nasal septum fracture, facial lacerations  - Outpatient follow-up w/ plastics  - Sinus precautions  - Facial lacerations repaired w/ Nylon sutures on 12/29    GOC:  -full code

## 2022-12-31 NOTE — PROGRESS NOTE ADULT - SUBJECTIVE AND OBJECTIVE BOX
Trauma Surgery Progress Note  Patient is a 89y old  Male who presents with a chief complaint of s/p fall (31 Dec 2022 00:01)      INTERVAL EVENTS:   - POCUS (12/30)- euvolemic   - Echo 20%, akinetic  - bicarb gtt 30 cc/hr > inc to 50, for acidosis  - Dobutamine started + levo/vaso, low SvO2 66%  - repeat blood cx    SUBJECTIVE: Patient seen and examined at bedside with surgical team, patient without complaints. Denies fever, chills, CP, SOB, nausea, vomiting, abdominal pain.    OBJECTIVE:    Vital Signs Last 24 Hrs  T(C): 36.6 (31 Dec 2022 15:00), Max: 36.7 (31 Dec 2022 03:00)  T(F): 97.9 (31 Dec 2022 15:00), Max: 98.1 (31 Dec 2022 03:00)  HR: 60 (31 Dec 2022 16:00) (59 - 78)  BP: 111/55 (31 Dec 2022 16:00) (94/55 - 134/63)  BP(mean): 79 (31 Dec 2022 16:00) (69 - 90)  RR: 17 (31 Dec 2022 16:00) (16 - 36)  SpO2: 98% (31 Dec 2022 16:00) (94% - 100%)    Parameters below as of 31 Dec 2022 15:00  Patient On (Oxygen Delivery Method): ventilator    O2 Concentration (%): 30I&O's Detail    30 Dec 2022 07:01  -  31 Dec 2022 07:00  --------------------------------------------------------  IN:    Dexmedetomidine: 3 mL    DOBUTamine: 78.8 mL    IV PiggyBack: 300 mL    IV PiggyBack: 100 mL    Norepinephrine: 96 mL    Norepinephrine: 69.1 mL    Sodium Bicarbonate: 800 mL    Sodium Bicarbonate: 45 mL    sodium chloride 0.9%: 600 mL    Vasopressin: 103.5 mL  Total IN: 2195.4 mL    OUT:    Indwelling Catheter - Urethral (mL): 590 mL  Total OUT: 590 mL    Total NET: 1605.4 mL      31 Dec 2022 07:01  -  31 Dec 2022 17:01  --------------------------------------------------------  IN:    DOBUTamine: 4.5 mL    DOBUTamine: 27 mL    Norepinephrine: 12.5 mL    Sodium Bicarbonate: 90 mL    Sodium Bicarbonate: 50 mL  Total IN: 184 mL    OUT:    Indwelling Catheter - Urethral (mL): 120 mL  Total OUT: 120 mL    Total NET: 64 mL      MEDICATIONS  (STANDING):  artificial  tears Solution 1 Drop(s) Both EYES four times a day  bacitracin   Ointment 1 Application(s) Topical two times a day  chlorhexidine 0.12% Liquid 15 milliLiter(s) Oral Mucosa every 12 hours  chlorhexidine 2% Cloths 1 Application(s) Topical <User Schedule>  DOBUTamine Infusion 2.5 MICROgram(s)/kG/Min (4.5 mL/Hr) IV Continuous <Continuous>  enoxaparin Injectable 30 milliGRAM(s) SubCutaneous every 24 hours  insulin lispro (ADMELOG) corrective regimen sliding scale   SubCutaneous every 6 hours  meropenem  IVPB 500 milliGRAM(s) IV Intermittent every 12 hours  norepinephrine Infusion 0.19 MICROgram(s)/kG/Min (21.4 mL/Hr) IV Continuous <Continuous>  pantoprazole  Injectable 40 milliGRAM(s) IV Push daily  sodium bicarbonate  Infusion 0.075 mEq/kG/Hr (30 mL/Hr) IV Continuous <Continuous>  sodium chloride 0.65% Nasal 1 Spray(s) Both Nostrils daily    MEDICATIONS  (PRN):  HYDROmorphone  Injectable 0.25 milliGRAM(s) IV Push every 4 hours PRN Severe Pain (7 - 10)      PHYSICAL EXAM:  Constitutional: A&Ox3, NAD  Respiratory: Unlabored breathing  Abdomen: Soft, nondistended, NTTP. No rebound or guarding.  Extremities: WWP, HANCOCK spontaneously    LABS:                        7.8    11.21 )-----------( 113      ( 31 Dec 2022 11:46 )             24.2     12-31    138  |  107  |  72<H>  ----------------------------<  107<H>  4.3   |  19<L>  |  3.09<H>    Ca    7.6<L>      31 Dec 2022 11:46  Phos  4.7     12-31  Mg     2.1     12-31    TPro  4.4<L>  /  Alb  2.3<L>  /  TBili  0.6  /  DBili  0.2  /  AST  18  /  ALT  12  /  AlkPhos  49  12-31    PT/INR - ( 31 Dec 2022 02:22 )   PT: 15.0 sec;   INR: 1.30 ratio         PTT - ( 31 Dec 2022 02:22 )  PTT:38.5 sec  LIVER FUNCTIONS - ( 31 Dec 2022 13:15 )  Alb: 2.3 g/dL / Pro: 4.4 g/dL / ALK PHOS: 49 U/L / ALT: 12 U/L / AST: 18 U/L / GGT: x                 IMAGING:

## 2022-12-31 NOTE — PROGRESS NOTE ADULT - ASSESSMENT
89 year old male history of CAD, s/p CABG, PPM, HTN, CKD 4, HLD, gout, upper GI bleed 2/2022, recent COVID 12/25/22, presenting as a level 1 trauma activation after being found down unresponsive in his backyard by his son. Patient arrived hypothermic to 78F, hypotensive to 60/40, responded to 110's/70's with warm IVF resuscitation, HR 70's, O2 100%, GCS 10 (4,2,4). During initial resuscitation became progressively more obtunded and was intubated for airway protection. eFAST negative  CXR and PXR in trauma bay unremarkable    PLAN:  - Wean vent as tolerated  - tertiary when patient is more responsive   - appreciate excellent care per SICU        Surgery [id number]

## 2022-12-31 NOTE — CHART NOTE - NSCHARTNOTEFT_GEN_A_CORE
EEG preliminary read (not final) on the initial recording hour(s) = x 2 hours     No seizures or epileptiform discharges recorded.  Moderate slowing noted, nonspecific.    [facility] EEG Reading Room Ph#: [phone]  Epilepsy Answering Service after 5PM and before 8:30AM: Ph#: [phone]

## 2022-12-31 NOTE — PROGRESS NOTE ADULT - SUBJECTIVE AND OBJECTIVE BOX
24 HOUR EVENTS:  -       HISTORY  Patient is a 89 y/o male w/ a PMHx of congenital deafness (communicates w/ ASL), CAD s/p triple vessel CABG, s/p biventricular PPM, HTN, HLD, CKD stage IV, BPH, gout, and anemia who presented as a level 1 trauma after being found down outside. As per the son, patient went to bed around 2130 yesterday and was found down outside around 0600 w/ his walker. There are two steps that lead outdoor. Of note, son states that he is normally A&Ox4 but had been intermittently lethargic & confused after he was found to be COVID-19 positive on 12/25. In the ED, GCS was 10 but mental status was progressively worsening so he was intubated for airway protection. He was also hypothermic to 25° C and hypotensive w/ BP in the 60s/40s that improved to the 110s/70s after a liter bolus of warm crystalloid but he became hypotensive again so vasopressor support was initiated and a right femoral arterial line was placed.       SUBJECTIVE/ROS:  [x ] A ten-point review of systems was otherwise negative except as noted.  [ ] Due to altered mental status/intubation, subjective information were not able to be obtained from the patient. History was obtained, to the extent possible, from review of the chart and collateral sources of information.      NEURO  Exam: awake, alert, oriented  Meds: acetaminophen   IVPB .. 1000 milliGRAM(s) IV Intermittent every 6 hours  dexMEDEtomidine Infusion 0.2 MICROgram(s)/kG/Hr IV Continuous <Continuous>  HYDROmorphone  Injectable 0.25 milliGRAM(s) IV Push every 4 hours PRN Severe Pain (7 - 10)    [x] Adequacy of sedation and pain control has been assessed and adjusted      RESPIRATORY  RR: 19 (12-30-22 @ 23:00) (11 - 24)  SpO2: 98% (12-30-22 @ 23:00) (95% - 100%)  Wt(kg): --  Exam: unlabored, clear to auscultation bilaterally  Mechanical Ventilation: Mode: AC/ CMV (Assist Control/ Continuous Mandatory Ventilation), RR (machine): 16, RR (patient): 16, TV (machine): 400, FiO2: 30, PEEP: 5, ITime: 0.9, MAP: 7.2, PIP: 15  ABG - ( 30 Dec 2022 22:00 )  pH: 7.32  /  pCO2: 37    /  pO2: 127   / HCO3: 19    / Base Excess: -6.4  /  SaO2: 99.3    Lactate: x                [N/A] Extubation Readiness Assessed  Meds:       CARDIOVASCULAR  HR: 62 (12-30-22 @ 23:00) (59 - 99)  BP: 109/53 (12-30-22 @ 19:00) (109/53 - 122/59)  BP(mean): 77 (12-30-22 @ 19:00) (77 - 85)  ABP: 116/38 (12-30-22 @ 23:00) (97/36 - 161/19)  ABP(mean): 63 (12-30-22 @ 23:00) (55 - 99)  Wt(kg): --  CVP(cm H2O): --  VBG - ( 30 Dec 2022 22:00 )  pH: 7.26  /  pCO2: 41    /  pO2: 44    / HCO3: 18    / Base Excess: -8.3  /  SaO2: 71.6   Lactate: x                  Exam: regular rate and rhythm  Cardiac Rhythm: sinus  Perfusion     [x]Adequate   [ ]Inadequate  Mentation   [x]Normal       [ ]Reduced  Extremities  [x]Warm         [ ]Cool  Volume Status [ ]Hypervolemic [x]Euvolemic [ ]Hypovolemic  Meds: DOBUTamine Infusion 2.5 MICROgram(s)/kG/Min IV Continuous <Continuous>  norepinephrine Infusion 0.19 MICROgram(s)/kG/Min IV Continuous <Continuous>        GI/NUTRITION  Exam: soft, nontender, nondistended, incision C/D/I  Diet:  Meds: pantoprazole  Injectable 40 milliGRAM(s) IV Push daily      GENITOURINARY  I&O's Detail    12-29 @ 07:01  -  12-30 @ 07:00  --------------------------------------------------------  IN:    Dexmedetomidine: 7.5 mL    IV PiggyBack: 1100 mL    IV PiggyBack: 300 mL    Norepinephrine: 663.9 mL    PRBCs (Packed Red Blood Cells): 600 mL    sodium chloride 0.9%: 1650 mL    Vasopressin: 81 mL  Total IN: 4402.4 mL    OUT:    Indwelling Catheter - Urethral (mL): 760 mL  Total OUT: 760 mL    Total NET: 3642.4 mL      12-30 @ 07:01  -  12-31 @ 00:01  --------------------------------------------------------  IN:    Dexmedetomidine: 3 mL    DOBUTamine: 42.8 mL    IV PiggyBack: 100 mL    IV PiggyBack: 150 mL    Norepinephrine: 96 mL    Norepinephrine: 43 mL    Sodium Bicarbonate: 45 mL    Sodium Bicarbonate: 400 mL    sodium chloride 0.9%: 600 mL    Vasopressin: 72 mL  Total IN: 1551.8 mL    OUT:    Indwelling Catheter - Urethral (mL): 420 mL  Total OUT: 420 mL    Total NET: 1131.8 mL          12-30    138  |  108  |  70<H>  ----------------------------<  119<H>  4.5   |  18<L>  |  3.05<H>    Ca    7.7<L>      30 Dec 2022 22:10  Phos  5.0     12-30  Mg     2.2     12-30    TPro  5.3<L>  /  Alb  3.0<L>  /  TBili  1.1  /  DBili  x   /  AST  26  /  ALT  15  /  AlkPhos  78  12-29    [ ] Davis catheter, indication: N/A  Meds: sodium bicarbonate  Infusion 0.125 mEq/kG/Hr IV Continuous <Continuous>        HEMATOLOGIC  Meds: enoxaparin Injectable 30 milliGRAM(s) SubCutaneous every 24 hours    [x] VTE Prophylaxis                        8.1    11.12 )-----------( 104      ( 30 Dec 2022 22:10 )             25.3     PT/INR - ( 30 Dec 2022 01:05 )   PT: 15.7 sec;   INR: 1.35 ratio         PTT - ( 30 Dec 2022 01:05 )  PTT:35.5 sec  Transfusion     [ ] PRBC   [ ] Platelets   [ ] FFP   [ ] Cryoprecipitate      INFECTIOUS DISEASES  WBC Count: 11.12 K/uL (12-30 @ 22:10)  WBC Count: 11.79 K/uL (12-30 @ 18:39)  WBC Count: 13.51 K/uL (12-30 @ 15:09)  WBC Count: 13.53 K/uL (12-30 @ 09:12)  WBC Count: 12.86 K/uL (12-30 @ 05:01)  WBC Count: 12.91 K/uL (12-30 @ 01:05)    RECENT CULTURES:  Specimen Source: Combi-Cath Combi-Cath  Date/Time: 12-29 @ 16:18  Culture Results:   Normal Respiratory Dariela present  Gram Stain:   Rare polymorphonuclear leukocytes per low power field  No Squamous epithelial cells per low power field  No organisms seen  Organism: --  Specimen Source: .Blood Blood-Peripheral  Date/Time: 12-29 @ 07:35  Culture Results:   Growth in anaerobic bottle: Gram Positive Cocci in Pairs and Chains  ***Blood Panel PCR results on this specimen are available  approximately 3 hours after the Gram stain result.***  Gram stain, PCR, and/or culture results may not always  correspond due to difference in methodologies.  ************************************************************  This PCR assay was performed by multiplex PCR. This  Assay tests for 66 bacterial and resistance gene targets.  Please refer to the [facility] test directory  at [email or web address] for details.  Gram Stain:   Growth in anaerobic bottle: Gram Positive Cocci in Pairs and Chains  Organism: Blood Culture PCR  Specimen Source: .Blood Blood-Peripheral  Date/Time: 12-29 @ 07:33  Culture Results:   No growth to date.  Gram Stain: --  Organism: --    Meds: meropenem  IVPB 500 milliGRAM(s) IV Intermittent every 12 hours        ENDOCRINE  CAPILLARY BLOOD GLUCOSE      POCT Blood Glucose.: 109 mg/dL (30 Dec 2022 22:56)  POCT Blood Glucose.: 125 mg/dL (30 Dec 2022 18:24)  POCT Blood Glucose.: 118 mg/dL (30 Dec 2022 11:06)  POCT Blood Glucose.: 163 mg/dL (30 Dec 2022 04:54)    Meds: insulin lispro (ADMELOG) corrective regimen sliding scale   SubCutaneous every 6 hours  vasopressin Infusion 0.03 Unit(s)/Min IV Continuous <Continuous>        ACCESS DEVICES:  [ ] Peripheral IV  [ ] Central Venous Line	[ ] R	[ ] L	[ ] IJ	[ ] Fem	[ ] SC	Placed:   [ ] Arterial Line		[ ] R	[ ] L	[ ] Fem	[ ] Rad	[ ] Ax	Placed:   [ ] PICC:					[ ] Mediport  [ ] Urinary Catheter, Date Placed:   [x] Necessity of urinary, arterial, and venous catheters discussed    OTHER MEDICATIONS:  artificial  tears Solution 1 Drop(s) Both EYES four times a day  bacitracin   Ointment 1 Application(s) Topical two times a day  chlorhexidine 0.12% Liquid 15 milliLiter(s) Oral Mucosa every 12 hours  chlorhexidine 2% Cloths 1 Application(s) Topical <User Schedule>  sodium chloride 0.65% Nasal 1 Spray(s) Both Nostrils daily      CODE STATUS:      IMAGING:     24 HOUR EVENTS:  - POCUS (12/30)- euvolemic   - Echo 20%, akinetic  - bicarb gtt 30 cc/hr > inc to 50, for acidosis  - Dobutamine started + levo/vaso, low SvO2 66%  - repeat blood cx        HISTORY  Patient is a 89 y/o male w/ a PMHx of congenital deafness (communicates w/ ASL), CAD s/p triple vessel CABG, s/p biventricular PPM, HTN, HLD, CKD stage IV, BPH, gout, and anemia who presented as a level 1 trauma after being found down outside. As per the son, patient went to bed around 2130 yesterday and was found down outside around 0600 w/ his walker. There are two steps that lead outdoor. Of note, son states that he is normally A&Ox4 but had been intermittently lethargic & confused after he was found to be COVID-19 positive on 12/25. In the ED, GCS was 10 but mental status was progressively worsening so he was intubated for airway protection. He was also hypothermic to 25° C and hypotensive w/ BP in the 60s/40s that improved to the 110s/70s after a liter bolus of warm crystalloid but he became hypotensive again so vasopressor support was initiated and a right femoral arterial line was placed.       SUBJECTIVE/ROS:  [x ] A ten-point review of systems was otherwise negative except as noted.  [ ] Due to altered mental status/intubation, subjective information were not able to be obtained from the patient. History was obtained, to the extent possible, from review of the chart and collateral sources of information.      NEURO  Exam: intubated, deaf, moves extr  Meds: acetaminophen   IVPB .. 1000 milliGRAM(s) IV Intermittent every 6 hours  dexMEDEtomidine Infusion 0.2 MICROgram(s)/kG/Hr IV Continuous <Continuous>  HYDROmorphone  Injectable 0.25 milliGRAM(s) IV Push every 4 hours PRN Severe Pain (7 - 10)    [x] Adequacy of sedation and pain control has been assessed and adjusted      RESPIRATORY  RR: 19 (12-30-22 @ 23:00) (11 - 24)  SpO2: 98% (12-30-22 @ 23:00) (95% - 100%)  Exam: unlabored, clear to auscultation bilaterally  Mechanical Ventilation: Mode: AC/ CMV (Assist Control/ Continuous Mandatory Ventilation), RR (machine): 16, RR (patient): 16, TV (machine): 400, FiO2: 30, PEEP: 5, ITime: 0.9, MAP: 7.2, PIP: 15  ABG - ( 30 Dec 2022 22:00 )  pH: 7.32  /  pCO2: 37    /  pO2: 127   / HCO3: 19    / Base Excess: -6.4  /  SaO2: 99.3          [N/A] Extubation Readiness Assessed      CARDIOVASCULAR  HR: 62 (12-30-22 @ 23:00) (59 - 99)  BP: 109/53 (12-30-22 @ 19:00) (109/53 - 122/59)  BP(mean): 77 (12-30-22 @ 19:00) (77 - 85)  ABP: 116/38 (12-30-22 @ 23:00) (97/36 - 161/19)  ABP(mean): 63 (12-30-22 @ 23:00) (55 - 99)  VBG - ( 30 Dec 2022 22:00 )  pH: 7.26  /  pCO2: 41    /  pO2: 44    / HCO3: 18    / Base Excess: -8.3  /  SaO2: 71.6       Exam: regular rate and rhythm  Cardiac Rhythm: sinus  Perfusion     [x]Adequate   [ ]Inadequate  Mentation   [x]Normal       [ ]Reduced  Extremities  [x]Warm         [ ]Cool  Volume Status [ ]Hypervolemic [x]Euvolemic [ ]Hypovolemic  Meds: DOBUTamine Infusion 2.5 MICROgram(s)/kG/Min IV Continuous <Continuous>  norepinephrine Infusion 0.19 MICROgram(s)/kG/Min IV Continuous <Continuous>        GI/NUTRITION  Exam: soft, nontender, nondistended, incision C/D/I  Diet: npo  Meds: pantoprazole  Injectable 40 milliGRAM(s) IV Push daily      GENITOURINARY  I&O's Detail    12-29 @ 07:01  -  12-30 @ 07:00  --------------------------------------------------------  IN:    Dexmedetomidine: 7.5 mL    IV PiggyBack: 1100 mL    IV PiggyBack: 300 mL    Norepinephrine: 663.9 mL    PRBCs (Packed Red Blood Cells): 600 mL    sodium chloride 0.9%: 1650 mL    Vasopressin: 81 mL  Total IN: 4402.4 mL    OUT:    Indwelling Catheter - Urethral (mL): 760 mL  Total OUT: 760 mL    Total NET: 3642.4 mL      12-30 @ 07:01  -  12-31 @ 00:01  --------------------------------------------------------  IN:    Dexmedetomidine: 3 mL    DOBUTamine: 42.8 mL    IV PiggyBack: 100 mL    IV PiggyBack: 150 mL    Norepinephrine: 96 mL    Norepinephrine: 43 mL    Sodium Bicarbonate: 45 mL    Sodium Bicarbonate: 400 mL    sodium chloride 0.9%: 600 mL    Vasopressin: 72 mL  Total IN: 1551.8 mL    OUT:    Indwelling Catheter - Urethral (mL): 420 mL  Total OUT: 420 mL    Total NET: 1131.8 mL          12-30    138  |  108  |  70<H>  ----------------------------<  119<H>  4.5   |  18<L>  |  3.05<H>    Ca    7.7<L>      30 Dec 2022 22:10  Phos  5.0     12-30  Mg     2.2     12-30    TPro  5.3<L>  /  Alb  3.0<L>  /  TBili  1.1  /  DBili  x   /  AST  26  /  ALT  15  /  AlkPhos  78  12-29    [ ] Davis catheter, indication: N/A  Meds: sodium bicarbonate  Infusion 0.125 mEq/kG/Hr IV Continuous <Continuous>        HEMATOLOGIC  Meds: enoxaparin Injectable 30 milliGRAM(s) SubCutaneous every 24 hours    [x] VTE Prophylaxis                        8.1    11.12 )-----------( 104      ( 30 Dec 2022 22:10 )             25.3     PT/INR - ( 30 Dec 2022 01:05 )   PT: 15.7 sec;   INR: 1.35 ratio         PTT - ( 30 Dec 2022 01:05 )  PTT:35.5 sec  Transfusion     [ ] PRBC   [ ] Platelets   [ ] FFP   [ ] Cryoprecipitate      INFECTIOUS DISEASES  WBC Count: 11.12 K/uL (12-30 @ 22:10)  WBC Count: 11.79 K/uL (12-30 @ 18:39)  WBC Count: 13.51 K/uL (12-30 @ 15:09)  WBC Count: 13.53 K/uL (12-30 @ 09:12)  WBC Count: 12.86 K/uL (12-30 @ 05:01)  WBC Count: 12.91 K/uL (12-30 @ 01:05)    RECENT CULTURES:  Specimen Source: Combi-Cath Combi-Cath  Date/Time: 12-29 @ 16:18  Culture Results:   Normal Respiratory Dariela present  Gram Stain:   Rare polymorphonuclear leukocytes per low power field  No Squamous epithelial cells per low power field  No organisms seen  Organism: --  Specimen Source: .Blood Blood-Peripheral  Date/Time: 12-29 @ 07:35  Culture Results:   Growth in anaerobic bottle: Gram Positive Cocci in Pairs and Chains  ***Blood Panel PCR results on this specimen are available  approximately 3 hours after the Gram stain result.***  Gram stain, PCR, and/or culture results may not always  correspond due to difference in methodologies.  ************************************************************  This PCR assay was performed by multiplex PCR. This  Assay tests for 66 bacterial and resistance gene targets.  Please refer to the [facility] test directory  at [email or web address] for details.  Gram Stain:   Growth in anaerobic bottle: Gram Positive Cocci in Pairs and Chains  Organism: Blood Culture PCR  Specimen Source: .Blood Blood-Peripheral  Date/Time: 12-29 @ 07:33  Culture Results:   No growth to date.  Gram Stain: --  Organism: --    Meds: meropenem  IVPB 500 milliGRAM(s) IV Intermittent every 12 hours        ENDOCRINE  CAPILLARY BLOOD GLUCOSE      POCT Blood Glucose.: 109 mg/dL (30 Dec 2022 22:56)  POCT Blood Glucose.: 125 mg/dL (30 Dec 2022 18:24)  POCT Blood Glucose.: 118 mg/dL (30 Dec 2022 11:06)  POCT Blood Glucose.: 163 mg/dL (30 Dec 2022 04:54)    Meds: insulin lispro (ADMELOG) corrective regimen sliding scale   SubCutaneous every 6 hours  vasopressin Infusion 0.03 Unit(s)/Min IV Continuous <Continuous>        ACCESS DEVICES:  [x ] Peripheral IV  [x ] Central Venous Line	[ ] R	[ ] L	[ ] IJ	[ ] Fem	[ ] SC	Placed:   [x ] Arterial Line		[ ] R	[ ] L	[ ] Fem	[ ] Rad	[ ] Ax	Placed:   [ ] PICC:					[ ] Mediport  [x ] Urinary Catheter, Date Placed:   [x] Necessity of urinary, arterial, and venous catheters discussed    OTHER MEDICATIONS:  artificial  tears Solution 1 Drop(s) Both EYES four times a day  bacitracin   Ointment 1 Application(s) Topical two times a day  chlorhexidine 0.12% Liquid 15 milliLiter(s) Oral Mucosa every 12 hours  chlorhexidine 2% Cloths 1 Application(s) Topical <User Schedule>  sodium chloride 0.65% Nasal 1 Spray(s) Both Nostrils daily      CODE STATUS:  full code

## 2022-12-31 NOTE — PROGRESS NOTE ADULT - ATTENDING COMMENTS
89 y/o male w/ a PMHx of CAD s/p triple vessel CABG, HTN, HLD, CKD stage IV, BPH, gout, and anemia who presented as a level 1 trauma after being found down outside. As per the son, patient went to bed around 2130 yesterday and was found down outside around 0600 w/ his walker. According to son he is normal A&Ox4 but had been intermittently lethargic & confused after he was found to be COVID-19 positive on .   Injuries include facial lac and fx. Pt hypothermic    Open eyes, does not follow, CTH neg, ?anoxic vs seizure. EEG ordered, may need repeat CTH  Acute resp failure post trauma, ABG metabolic acidosis with resp compensation  In septic cardiogenic shock, SVO2 increased with , increase  to 5 mcg, Ci 2.3  ECHO EF 20%  Titrate Levo, on Vaso,     BC grew Strep mitis probably contaminant, repeat cultures sent.  on Vanco and Meropenem. Vanco level 15.9. Will DC Vanco if   Pt COVID +, not a candidate for Remdesivir sec to CKD  Difficulty passing NGT, will attempt again, tube feed if able to pass  IVF with bicarb to improve metabolic acidosis, correction of metabolic acidosis will help in extubation  ISS/HbA1C  DVT ppx with renally adjusted dose Lovenox, stable HCT 89 y/o male w/ a PMHx of CAD s/p triple vessel CABG, HTN, HLD, CKD stage IV, BPH, gout, and anemia who presented as a level 1 trauma after being found down outside. As per the son, patient went to bed around 2130 yesterday and was found down outside around 0600 w/ his walker. According to son he is normal A&Ox4 but had been intermittently lethargic & confused after he was found to be COVID-19 positive on .   Injuries include facial lac and fx. Pt hypothermic    Open eyes, does not follow, CTH neg, ?anoxic vs seizure. EEG ordered, may need repeat CTH  Acute resp failure post trauma, ABG metabolic acidosis with resp compensation  In septic cardiogenic shock, SVO2 increased with , increase  to 5 mcg, Ci 2.3  ECHO EF 20%  Titrate Levo, on Vaso,   Will try NGT placement again, NGT feed once available  BC grew Strep mitis probably contaminant, repeat cultures sent.  on Vanco and Meropenem. Vanco level 15.9. Will DC Vanco if cultures remain neg  Pt COVID +, not a candidate for Remdesivir sec to CKD  Decrease IVF with bicarb rate, one dose Lasix  ISS/HbA1C  DVT ppx with renally adjusted dose Lovenox, stable HCT

## 2023-01-01 DIAGNOSIS — Z51.5 ENCOUNTER FOR PALLIATIVE CARE: ICD-10-CM

## 2023-01-01 DIAGNOSIS — H91.90 UNSPECIFIED HEARING LOSS, UNSPECIFIED EAR: ICD-10-CM

## 2023-01-01 DIAGNOSIS — R13.10 DYSPHAGIA, UNSPECIFIED: ICD-10-CM

## 2023-01-01 DIAGNOSIS — S09.93XA UNSPECIFIED INJURY OF FACE, INITIAL ENCOUNTER: ICD-10-CM

## 2023-01-01 DIAGNOSIS — R53.81 OTHER MALAISE: ICD-10-CM

## 2023-01-01 LAB
ANION GAP SERPL CALC-SCNC: 10 MMOL/L — SIGNIFICANT CHANGE UP (ref 5–17)
ANION GAP SERPL CALC-SCNC: 11 MMOL/L — SIGNIFICANT CHANGE UP (ref 5–17)
ANION GAP SERPL CALC-SCNC: 11 MMOL/L — SIGNIFICANT CHANGE UP (ref 5–17)
ANION GAP SERPL CALC-SCNC: 13 MMOL/L — SIGNIFICANT CHANGE UP (ref 5–17)
ANION GAP SERPL CALC-SCNC: 15 MMOL/L — SIGNIFICANT CHANGE UP (ref 5–17)
ANION GAP SERPL CALC-SCNC: 16 MMOL/L — SIGNIFICANT CHANGE UP (ref 5–17)
ANION GAP SERPL CALC-SCNC: 9 MMOL/L — SIGNIFICANT CHANGE UP (ref 5–17)
ANION GAP SERPL CALC-SCNC: 9 MMOL/L — SIGNIFICANT CHANGE UP (ref 5–17)
APPEARANCE UR: ABNORMAL
APTT BLD: 37.6 SEC — HIGH (ref 27.5–35.5)
APTT BLD: 38.9 SEC — HIGH (ref 27.5–35.5)
APTT BLD: 43.2 SEC — HIGH (ref 27.5–35.5)
APTT BLD: 43.9 SEC — HIGH (ref 27.5–35.5)
APTT BLD: 47.7 SEC — HIGH (ref 27.5–35.5)
APTT BLD: 49.2 SEC — HIGH (ref 27.5–35.5)
BACTERIA # UR AUTO: NEGATIVE — SIGNIFICANT CHANGE UP
BILIRUB UR-MCNC: NEGATIVE — SIGNIFICANT CHANGE UP
BLD GP AB SCN SERPL QL: NEGATIVE — SIGNIFICANT CHANGE UP
BUN SERPL-MCNC: 64 MG/DL — HIGH (ref 7–23)
BUN SERPL-MCNC: 69 MG/DL — HIGH (ref 7–23)
BUN SERPL-MCNC: 70 MG/DL — HIGH (ref 7–23)
BUN SERPL-MCNC: 71 MG/DL — HIGH (ref 7–23)
BUN SERPL-MCNC: 71 MG/DL — HIGH (ref 7–23)
BUN SERPL-MCNC: 72 MG/DL — HIGH (ref 7–23)
BUN SERPL-MCNC: 73 MG/DL — HIGH (ref 7–23)
BUN SERPL-MCNC: 75 MG/DL — HIGH (ref 7–23)
CALCIUM SERPL-MCNC: 7.8 MG/DL — LOW (ref 8.4–10.5)
CALCIUM SERPL-MCNC: 7.9 MG/DL — LOW (ref 8.4–10.5)
CALCIUM SERPL-MCNC: 8 MG/DL — LOW (ref 8.4–10.5)
CALCIUM SERPL-MCNC: 8.1 MG/DL — LOW (ref 8.4–10.5)
CALCIUM SERPL-MCNC: 8.7 MG/DL — SIGNIFICANT CHANGE UP (ref 8.4–10.5)
CALCIUM SERPL-MCNC: 8.8 MG/DL — SIGNIFICANT CHANGE UP (ref 8.4–10.5)
CALCIUM SERPL-MCNC: 9.1 MG/DL — SIGNIFICANT CHANGE UP (ref 8.4–10.5)
CALCIUM SERPL-MCNC: 9.1 MG/DL — SIGNIFICANT CHANGE UP (ref 8.4–10.5)
CHLORIDE SERPL-SCNC: 101 MMOL/L — SIGNIFICANT CHANGE UP (ref 96–108)
CHLORIDE SERPL-SCNC: 106 MMOL/L — SIGNIFICANT CHANGE UP (ref 96–108)
CHLORIDE SERPL-SCNC: 107 MMOL/L — SIGNIFICANT CHANGE UP (ref 96–108)
CHLORIDE SERPL-SCNC: 109 MMOL/L — HIGH (ref 96–108)
CHLORIDE SERPL-SCNC: 109 MMOL/L — HIGH (ref 96–108)
CHLORIDE SERPL-SCNC: 110 MMOL/L — HIGH (ref 96–108)
CHLORIDE SERPL-SCNC: 111 MMOL/L — HIGH (ref 96–108)
CK SERPL-CCNC: 80 U/L — SIGNIFICANT CHANGE UP (ref 30–200)
CK SERPL-CCNC: 82 U/L — SIGNIFICANT CHANGE UP (ref 30–200)
CO2 SERPL-SCNC: 21 MMOL/L — LOW (ref 22–31)
CO2 SERPL-SCNC: 24 MMOL/L — SIGNIFICANT CHANGE UP (ref 22–31)
CO2 SERPL-SCNC: 25 MMOL/L — SIGNIFICANT CHANGE UP (ref 22–31)
CO2 SERPL-SCNC: 26 MMOL/L — SIGNIFICANT CHANGE UP (ref 22–31)
CO2 SERPL-SCNC: 26 MMOL/L — SIGNIFICANT CHANGE UP (ref 22–31)
CO2 SERPL-SCNC: 29 MMOL/L — SIGNIFICANT CHANGE UP (ref 22–31)
COLOR SPEC: YELLOW — SIGNIFICANT CHANGE UP
CREAT SERPL-MCNC: 2.34 MG/DL — HIGH (ref 0.5–1.3)
CREAT SERPL-MCNC: 2.39 MG/DL — HIGH (ref 0.5–1.3)
CREAT SERPL-MCNC: 2.44 MG/DL — HIGH (ref 0.5–1.3)
CREAT SERPL-MCNC: 2.44 MG/DL — HIGH (ref 0.5–1.3)
CREAT SERPL-MCNC: 2.46 MG/DL — HIGH (ref 0.5–1.3)
CREAT SERPL-MCNC: 2.51 MG/DL — HIGH (ref 0.5–1.3)
CREAT SERPL-MCNC: 2.52 MG/DL — HIGH (ref 0.5–1.3)
CREAT SERPL-MCNC: 2.69 MG/DL — HIGH (ref 0.5–1.3)
CREAT SERPL-MCNC: 3 MG/DL — HIGH (ref 0.5–1.3)
CREAT SERPL-MCNC: 3.09 MG/DL — HIGH (ref 0.5–1.3)
CULTURE RESULTS: SIGNIFICANT CHANGE UP
DIFF PNL FLD: ABNORMAL
EGFR: 18 ML/MIN/1.73M2 — LOW
EGFR: 18 ML/MIN/1.73M2 — LOW
EGFR: 21 ML/MIN/1.73M2 — LOW
EGFR: 22 ML/MIN/1.73M2 — LOW
EGFR: 23 ML/MIN/1.73M2 — LOW
EGFR: 24 ML/MIN/1.73M2 — LOW
EGFR: 25 ML/MIN/1.73M2 — LOW
EPI CELLS # UR: 1 /HPF — SIGNIFICANT CHANGE UP
FERRITIN SERPL-MCNC: 529 NG/ML — HIGH (ref 30–400)
GAS PNL BLDA: SIGNIFICANT CHANGE UP
GLUCOSE BLDC GLUCOMTR-MCNC: 118 MG/DL — HIGH (ref 70–99)
GLUCOSE BLDC GLUCOMTR-MCNC: 135 MG/DL — HIGH (ref 70–99)
GLUCOSE BLDC GLUCOMTR-MCNC: 142 MG/DL — HIGH (ref 70–99)
GLUCOSE BLDC GLUCOMTR-MCNC: 87 MG/DL — SIGNIFICANT CHANGE UP (ref 70–99)
GLUCOSE BLDC GLUCOMTR-MCNC: 89 MG/DL — SIGNIFICANT CHANGE UP (ref 70–99)
GLUCOSE BLDC GLUCOMTR-MCNC: 92 MG/DL — SIGNIFICANT CHANGE UP (ref 70–99)
GLUCOSE BLDC GLUCOMTR-MCNC: 94 MG/DL — SIGNIFICANT CHANGE UP (ref 70–99)
GLUCOSE BLDC GLUCOMTR-MCNC: 95 MG/DL — SIGNIFICANT CHANGE UP (ref 70–99)
GLUCOSE SERPL-MCNC: 103 MG/DL — HIGH (ref 70–99)
GLUCOSE SERPL-MCNC: 106 MG/DL — HIGH (ref 70–99)
GLUCOSE SERPL-MCNC: 129 MG/DL — HIGH (ref 70–99)
GLUCOSE SERPL-MCNC: 165 MG/DL — HIGH (ref 70–99)
GLUCOSE SERPL-MCNC: 185 MG/DL — HIGH (ref 70–99)
GLUCOSE SERPL-MCNC: 366 MG/DL — HIGH (ref 70–99)
GLUCOSE SERPL-MCNC: 76 MG/DL — SIGNIFICANT CHANGE UP (ref 70–99)
GLUCOSE SERPL-MCNC: 82 MG/DL — SIGNIFICANT CHANGE UP (ref 70–99)
GLUCOSE SERPL-MCNC: 86 MG/DL — SIGNIFICANT CHANGE UP (ref 70–99)
GLUCOSE SERPL-MCNC: 92 MG/DL — SIGNIFICANT CHANGE UP (ref 70–99)
GLUCOSE UR QL: NEGATIVE — SIGNIFICANT CHANGE UP
HCT VFR BLD CALC: 21.9 % — LOW (ref 39–50)
HCT VFR BLD CALC: 22.2 % — LOW (ref 39–50)
HCT VFR BLD CALC: 22.7 % — LOW (ref 39–50)
HCT VFR BLD CALC: 25.4 % — LOW (ref 39–50)
HCT VFR BLD CALC: 25.8 % — LOW (ref 39–50)
HCT VFR BLD CALC: 26.7 % — LOW (ref 39–50)
HCT VFR BLD CALC: 26.9 % — LOW (ref 39–50)
HCT VFR BLD CALC: 27 % — LOW (ref 39–50)
HCT VFR BLD CALC: 28 % — LOW (ref 39–50)
HCT VFR BLD CALC: 28.1 % — LOW (ref 39–50)
HCT VFR BLD CALC: 29.5 % — LOW (ref 39–50)
HGB BLD-MCNC: 7.1 G/DL — LOW (ref 13–17)
HGB BLD-MCNC: 7.2 G/DL — LOW (ref 13–17)
HGB BLD-MCNC: 7.3 G/DL — LOW (ref 13–17)
HGB BLD-MCNC: 7.9 G/DL — LOW (ref 13–17)
HGB BLD-MCNC: 8.3 G/DL — LOW (ref 13–17)
HGB BLD-MCNC: 8.4 G/DL — LOW (ref 13–17)
HGB BLD-MCNC: 8.5 G/DL — LOW (ref 13–17)
HGB BLD-MCNC: 8.7 G/DL — LOW (ref 13–17)
HGB BLD-MCNC: 8.8 G/DL — LOW (ref 13–17)
HGB BLD-MCNC: 8.9 G/DL — LOW (ref 13–17)
HGB BLD-MCNC: 9.2 G/DL — LOW (ref 13–17)
HYALINE CASTS # UR AUTO: 5 /LPF — HIGH (ref 0–2)
INR BLD: 1.2 RATIO — HIGH (ref 0.88–1.16)
INR BLD: 1.21 RATIO — HIGH (ref 0.88–1.16)
INR BLD: 1.23 RATIO — HIGH (ref 0.88–1.16)
INR BLD: 1.24 RATIO — HIGH (ref 0.88–1.16)
INR BLD: 1.26 RATIO — HIGH (ref 0.88–1.16)
INR BLD: 1.27 RATIO — HIGH (ref 0.88–1.16)
IRON SATN MFR SERPL: 11 % — LOW (ref 16–55)
IRON SATN MFR SERPL: 14 UG/DL — LOW (ref 45–165)
KETONES UR-MCNC: NEGATIVE — SIGNIFICANT CHANGE UP
LEUKOCYTE ESTERASE UR-ACNC: NEGATIVE — SIGNIFICANT CHANGE UP
MAGNESIUM SERPL-MCNC: 2.2 MG/DL — SIGNIFICANT CHANGE UP (ref 1.6–2.6)
MAGNESIUM SERPL-MCNC: 2.3 MG/DL — SIGNIFICANT CHANGE UP (ref 1.6–2.6)
MAGNESIUM SERPL-MCNC: 2.8 MG/DL — HIGH (ref 1.6–2.6)
MCHC RBC-ENTMCNC: 31 PG — SIGNIFICANT CHANGE UP (ref 27–34)
MCHC RBC-ENTMCNC: 31.1 GM/DL — LOW (ref 32–36)
MCHC RBC-ENTMCNC: 31.1 PG — SIGNIFICANT CHANGE UP (ref 27–34)
MCHC RBC-ENTMCNC: 31.1 PG — SIGNIFICANT CHANGE UP (ref 27–34)
MCHC RBC-ENTMCNC: 31.2 GM/DL — LOW (ref 32–36)
MCHC RBC-ENTMCNC: 31.2 PG — SIGNIFICANT CHANGE UP (ref 27–34)
MCHC RBC-ENTMCNC: 31.2 PG — SIGNIFICANT CHANGE UP (ref 27–34)
MCHC RBC-ENTMCNC: 31.3 GM/DL — LOW (ref 32–36)
MCHC RBC-ENTMCNC: 31.5 GM/DL — LOW (ref 32–36)
MCHC RBC-ENTMCNC: 31.6 GM/DL — LOW (ref 32–36)
MCHC RBC-ENTMCNC: 31.6 PG — SIGNIFICANT CHANGE UP (ref 27–34)
MCHC RBC-ENTMCNC: 31.6 PG — SIGNIFICANT CHANGE UP (ref 27–34)
MCHC RBC-ENTMCNC: 31.8 GM/DL — LOW (ref 32–36)
MCHC RBC-ENTMCNC: 31.9 PG — SIGNIFICANT CHANGE UP (ref 27–34)
MCHC RBC-ENTMCNC: 32.2 GM/DL — SIGNIFICANT CHANGE UP (ref 32–36)
MCHC RBC-ENTMCNC: 32.4 GM/DL — SIGNIFICANT CHANGE UP (ref 32–36)
MCHC RBC-ENTMCNC: 32.4 GM/DL — SIGNIFICANT CHANGE UP (ref 32–36)
MCV RBC AUTO: 100.4 FL — HIGH (ref 80–100)
MCV RBC AUTO: 96.1 FL — SIGNIFICANT CHANGE UP (ref 80–100)
MCV RBC AUTO: 96.3 FL — SIGNIFICANT CHANGE UP (ref 80–100)
MCV RBC AUTO: 97 FL — SIGNIFICANT CHANGE UP (ref 80–100)
MCV RBC AUTO: 97.3 FL — SIGNIFICANT CHANGE UP (ref 80–100)
MCV RBC AUTO: 97.9 FL — SIGNIFICANT CHANGE UP (ref 80–100)
MCV RBC AUTO: 98.2 FL — SIGNIFICANT CHANGE UP (ref 80–100)
MCV RBC AUTO: 98.5 FL — SIGNIFICANT CHANGE UP (ref 80–100)
MCV RBC AUTO: 99.3 FL — SIGNIFICANT CHANGE UP (ref 80–100)
MCV RBC AUTO: 99.6 FL — SIGNIFICANT CHANGE UP (ref 80–100)
MCV RBC AUTO: 99.6 FL — SIGNIFICANT CHANGE UP (ref 80–100)
NITRITE UR-MCNC: NEGATIVE — SIGNIFICANT CHANGE UP
NRBC # BLD: 0 /100 WBCS — SIGNIFICANT CHANGE UP (ref 0–0)
PH UR: 6 — SIGNIFICANT CHANGE UP (ref 5–8)
PHOSPHATE SERPL-MCNC: 2.8 MG/DL — SIGNIFICANT CHANGE UP (ref 2.5–4.5)
PHOSPHATE SERPL-MCNC: 3 MG/DL — SIGNIFICANT CHANGE UP (ref 2.5–4.5)
PHOSPHATE SERPL-MCNC: 3.1 MG/DL — SIGNIFICANT CHANGE UP (ref 2.5–4.5)
PHOSPHATE SERPL-MCNC: 3.2 MG/DL — SIGNIFICANT CHANGE UP (ref 2.5–4.5)
PHOSPHATE SERPL-MCNC: 3.2 MG/DL — SIGNIFICANT CHANGE UP (ref 2.5–4.5)
PHOSPHATE SERPL-MCNC: 3.3 MG/DL — SIGNIFICANT CHANGE UP (ref 2.5–4.5)
PHOSPHATE SERPL-MCNC: 3.4 MG/DL — SIGNIFICANT CHANGE UP (ref 2.5–4.5)
PHOSPHATE SERPL-MCNC: 3.5 MG/DL — SIGNIFICANT CHANGE UP (ref 2.5–4.5)
PHOSPHATE SERPL-MCNC: 3.6 MG/DL — SIGNIFICANT CHANGE UP (ref 2.5–4.5)
PHOSPHATE SERPL-MCNC: 4.5 MG/DL — SIGNIFICANT CHANGE UP (ref 2.5–4.5)
PLATELET # BLD AUTO: 119 K/UL — LOW (ref 150–400)
PLATELET # BLD AUTO: 120 K/UL — LOW (ref 150–400)
PLATELET # BLD AUTO: 122 K/UL — LOW (ref 150–400)
PLATELET # BLD AUTO: 132 K/UL — LOW (ref 150–400)
PLATELET # BLD AUTO: 141 K/UL — LOW (ref 150–400)
PLATELET # BLD AUTO: 143 K/UL — LOW (ref 150–400)
PLATELET # BLD AUTO: 161 K/UL — SIGNIFICANT CHANGE UP (ref 150–400)
PLATELET # BLD AUTO: 183 K/UL — SIGNIFICANT CHANGE UP (ref 150–400)
PLATELET # BLD AUTO: 226 K/UL — SIGNIFICANT CHANGE UP (ref 150–400)
PLATELET # BLD AUTO: 231 K/UL — SIGNIFICANT CHANGE UP (ref 150–400)
PLATELET # BLD AUTO: 235 K/UL — SIGNIFICANT CHANGE UP (ref 150–400)
POTASSIUM SERPL-MCNC: 3.9 MMOL/L — SIGNIFICANT CHANGE UP (ref 3.5–5.3)
POTASSIUM SERPL-MCNC: 4 MMOL/L — SIGNIFICANT CHANGE UP (ref 3.5–5.3)
POTASSIUM SERPL-MCNC: 4.1 MMOL/L — SIGNIFICANT CHANGE UP (ref 3.5–5.3)
POTASSIUM SERPL-MCNC: 4.3 MMOL/L — SIGNIFICANT CHANGE UP (ref 3.5–5.3)
POTASSIUM SERPL-MCNC: 4.4 MMOL/L — SIGNIFICANT CHANGE UP (ref 3.5–5.3)
POTASSIUM SERPL-MCNC: 4.5 MMOL/L — SIGNIFICANT CHANGE UP (ref 3.5–5.3)
POTASSIUM SERPL-MCNC: 4.6 MMOL/L — SIGNIFICANT CHANGE UP (ref 3.5–5.3)
POTASSIUM SERPL-SCNC: 3.9 MMOL/L — SIGNIFICANT CHANGE UP (ref 3.5–5.3)
POTASSIUM SERPL-SCNC: 4 MMOL/L — SIGNIFICANT CHANGE UP (ref 3.5–5.3)
POTASSIUM SERPL-SCNC: 4.1 MMOL/L — SIGNIFICANT CHANGE UP (ref 3.5–5.3)
POTASSIUM SERPL-SCNC: 4.3 MMOL/L — SIGNIFICANT CHANGE UP (ref 3.5–5.3)
POTASSIUM SERPL-SCNC: 4.4 MMOL/L — SIGNIFICANT CHANGE UP (ref 3.5–5.3)
POTASSIUM SERPL-SCNC: 4.5 MMOL/L — SIGNIFICANT CHANGE UP (ref 3.5–5.3)
POTASSIUM SERPL-SCNC: 4.6 MMOL/L — SIGNIFICANT CHANGE UP (ref 3.5–5.3)
PROCALCITONIN SERPL-MCNC: 0.81 NG/ML — HIGH (ref 0.02–0.1)
PROCALCITONIN SERPL-MCNC: 1.16 NG/ML — HIGH (ref 0.02–0.1)
PROT UR-MCNC: ABNORMAL
PROTHROM AB SERPL-ACNC: 13.9 SEC — HIGH (ref 10.5–13.4)
PROTHROM AB SERPL-ACNC: 14 SEC — HIGH (ref 10.5–13.4)
PROTHROM AB SERPL-ACNC: 14.3 SEC — HIGH (ref 10.5–13.4)
PROTHROM AB SERPL-ACNC: 14.4 SEC — HIGH (ref 10.5–13.4)
PROTHROM AB SERPL-ACNC: 14.5 SEC — HIGH (ref 10.5–13.4)
PROTHROM AB SERPL-ACNC: 14.8 SEC — HIGH (ref 10.5–13.4)
RBC # BLD: 2.25 M/UL — LOW (ref 4.2–5.8)
RBC # BLD: 2.26 M/UL — LOW (ref 4.2–5.8)
RBC # BLD: 2.34 M/UL — LOW (ref 4.2–5.8)
RBC # BLD: 2.55 M/UL — LOW (ref 4.2–5.8)
RBC # BLD: 2.66 M/UL — LOW (ref 4.2–5.8)
RBC # BLD: 2.68 M/UL — LOW (ref 4.2–5.8)
RBC # BLD: 2.73 M/UL — LOW (ref 4.2–5.8)
RBC # BLD: 2.81 M/UL — LOW (ref 4.2–5.8)
RBC # BLD: 2.82 M/UL — LOW (ref 4.2–5.8)
RBC # BLD: 2.86 M/UL — LOW (ref 4.2–5.8)
RBC # BLD: 2.97 M/UL — LOW (ref 4.2–5.8)
RBC # FLD: 17 % — HIGH (ref 10.3–14.5)
RBC # FLD: 17.1 % — HIGH (ref 10.3–14.5)
RBC # FLD: 17.2 % — HIGH (ref 10.3–14.5)
RBC # FLD: 17.3 % — HIGH (ref 10.3–14.5)
RBC # FLD: 17.6 % — HIGH (ref 10.3–14.5)
RBC # FLD: 17.7 % — HIGH (ref 10.3–14.5)
RBC # FLD: 17.9 % — HIGH (ref 10.3–14.5)
RBC CASTS # UR COMP ASSIST: 159 /HPF — HIGH (ref 0–4)
RH IG SCN BLD-IMP: POSITIVE — SIGNIFICANT CHANGE UP
SODIUM SERPL-SCNC: 138 MMOL/L — SIGNIFICANT CHANGE UP (ref 135–145)
SODIUM SERPL-SCNC: 140 MMOL/L — SIGNIFICANT CHANGE UP (ref 135–145)
SODIUM SERPL-SCNC: 141 MMOL/L — SIGNIFICANT CHANGE UP (ref 135–145)
SODIUM SERPL-SCNC: 143 MMOL/L — SIGNIFICANT CHANGE UP (ref 135–145)
SODIUM SERPL-SCNC: 145 MMOL/L — SIGNIFICANT CHANGE UP (ref 135–145)
SODIUM SERPL-SCNC: 146 MMOL/L — HIGH (ref 135–145)
SODIUM SERPL-SCNC: 147 MMOL/L — HIGH (ref 135–145)
SODIUM SERPL-SCNC: 147 MMOL/L — HIGH (ref 135–145)
SODIUM SERPL-SCNC: 148 MMOL/L — HIGH (ref 135–145)
SODIUM SERPL-SCNC: 148 MMOL/L — HIGH (ref 135–145)
SP GR SPEC: 1.02 — SIGNIFICANT CHANGE UP (ref 1.01–1.02)
SPECIMEN SOURCE: SIGNIFICANT CHANGE UP
TIBC SERPL-MCNC: 126 UG/DL — LOW (ref 220–430)
TRANSFERRIN SERPL-MCNC: 85 MG/DL — LOW (ref 200–360)
UIBC SERPL-MCNC: 112 UG/DL — SIGNIFICANT CHANGE UP (ref 110–370)
UROBILINOGEN FLD QL: ABNORMAL
VANCOMYCIN FLD-MCNC: 13.1 UG/ML — SIGNIFICANT CHANGE UP
WBC # BLD: 10.06 K/UL — SIGNIFICANT CHANGE UP (ref 3.8–10.5)
WBC # BLD: 10.32 K/UL — SIGNIFICANT CHANGE UP (ref 3.8–10.5)
WBC # BLD: 10.42 K/UL — SIGNIFICANT CHANGE UP (ref 3.8–10.5)
WBC # BLD: 10.88 K/UL — HIGH (ref 3.8–10.5)
WBC # BLD: 11.12 K/UL — HIGH (ref 3.8–10.5)
WBC # BLD: 11.15 K/UL — HIGH (ref 3.8–10.5)
WBC # BLD: 12.03 K/UL — HIGH (ref 3.8–10.5)
WBC # BLD: 13.16 K/UL — HIGH (ref 3.8–10.5)
WBC # BLD: 13.57 K/UL — HIGH (ref 3.8–10.5)
WBC # BLD: 8.29 K/UL — SIGNIFICANT CHANGE UP (ref 3.8–10.5)
WBC # BLD: 8.58 K/UL — SIGNIFICANT CHANGE UP (ref 3.8–10.5)
WBC # FLD AUTO: 10.06 K/UL — SIGNIFICANT CHANGE UP (ref 3.8–10.5)
WBC # FLD AUTO: 10.32 K/UL — SIGNIFICANT CHANGE UP (ref 3.8–10.5)
WBC # FLD AUTO: 10.42 K/UL — SIGNIFICANT CHANGE UP (ref 3.8–10.5)
WBC # FLD AUTO: 10.88 K/UL — HIGH (ref 3.8–10.5)
WBC # FLD AUTO: 11.12 K/UL — HIGH (ref 3.8–10.5)
WBC # FLD AUTO: 11.15 K/UL — HIGH (ref 3.8–10.5)
WBC # FLD AUTO: 12.03 K/UL — HIGH (ref 3.8–10.5)
WBC # FLD AUTO: 13.16 K/UL — HIGH (ref 3.8–10.5)
WBC # FLD AUTO: 13.57 K/UL — HIGH (ref 3.8–10.5)
WBC # FLD AUTO: 8.29 K/UL — SIGNIFICANT CHANGE UP (ref 3.8–10.5)
WBC # FLD AUTO: 8.58 K/UL — SIGNIFICANT CHANGE UP (ref 3.8–10.5)
WBC UR QL: 4 /HPF — SIGNIFICANT CHANGE UP (ref 0–5)

## 2023-01-01 PROCEDURE — P9016: CPT

## 2023-01-01 PROCEDURE — 97530 THERAPEUTIC ACTIVITIES: CPT

## 2023-01-01 PROCEDURE — 86901 BLOOD TYPING SEROLOGIC RH(D): CPT

## 2023-01-01 PROCEDURE — 99233 SBSQ HOSP IP/OBS HIGH 50: CPT

## 2023-01-01 PROCEDURE — 84145 PROCALCITONIN (PCT): CPT

## 2023-01-01 PROCEDURE — 86900 BLOOD TYPING SEROLOGIC ABO: CPT

## 2023-01-01 PROCEDURE — 82947 ASSAY GLUCOSE BLOOD QUANT: CPT

## 2023-01-01 PROCEDURE — 94002 VENT MGMT INPAT INIT DAY: CPT

## 2023-01-01 PROCEDURE — 95700 EEG CONT REC W/VID EEG TECH: CPT

## 2023-01-01 PROCEDURE — 82962 GLUCOSE BLOOD TEST: CPT

## 2023-01-01 PROCEDURE — 85014 HEMATOCRIT: CPT

## 2023-01-01 PROCEDURE — 82550 ASSAY OF CK (CPK): CPT

## 2023-01-01 PROCEDURE — 82728 ASSAY OF FERRITIN: CPT

## 2023-01-01 PROCEDURE — 95711 VEEG 2-12 HR UNMONITORED: CPT

## 2023-01-01 PROCEDURE — 84480 ASSAY TRIIODOTHYRONINE (T3): CPT

## 2023-01-01 PROCEDURE — 80053 COMPREHEN METABOLIC PANEL: CPT

## 2023-01-01 PROCEDURE — 84466 ASSAY OF TRANSFERRIN: CPT

## 2023-01-01 PROCEDURE — 71045 X-RAY EXAM CHEST 1 VIEW: CPT | Mod: 26

## 2023-01-01 PROCEDURE — 99232 SBSQ HOSP IP/OBS MODERATE 35: CPT

## 2023-01-01 PROCEDURE — 97110 THERAPEUTIC EXERCISES: CPT

## 2023-01-01 PROCEDURE — 93308 TTE F-UP OR LMTD: CPT

## 2023-01-01 PROCEDURE — 72125 CT NECK SPINE W/O DYE: CPT | Mod: MA

## 2023-01-01 PROCEDURE — 87641 MR-STAPH DNA AMP PROBE: CPT

## 2023-01-01 PROCEDURE — 71250 CT THORAX DX C-: CPT | Mod: MA

## 2023-01-01 PROCEDURE — 99291 CRITICAL CARE FIRST HOUR: CPT

## 2023-01-01 PROCEDURE — 83540 ASSAY OF IRON: CPT

## 2023-01-01 PROCEDURE — 81001 URINALYSIS AUTO W/SCOPE: CPT

## 2023-01-01 PROCEDURE — 99232 SBSQ HOSP IP/OBS MODERATE 35: CPT | Mod: GC

## 2023-01-01 PROCEDURE — 87637 SARSCOV2&INF A&B&RSV AMP PRB: CPT

## 2023-01-01 PROCEDURE — 87150 DNA/RNA AMPLIFIED PROBE: CPT

## 2023-01-01 PROCEDURE — 85730 THROMBOPLASTIN TIME PARTIAL: CPT

## 2023-01-01 PROCEDURE — 72170 X-RAY EXAM OF PELVIS: CPT

## 2023-01-01 PROCEDURE — 83605 ASSAY OF LACTIC ACID: CPT

## 2023-01-01 PROCEDURE — 85025 COMPLETE CBC W/AUTO DIFF WBC: CPT

## 2023-01-01 PROCEDURE — 85027 COMPLETE CBC AUTOMATED: CPT

## 2023-01-01 PROCEDURE — 82435 ASSAY OF BLOOD CHLORIDE: CPT

## 2023-01-01 PROCEDURE — 87077 CULTURE AEROBIC IDENTIFY: CPT

## 2023-01-01 PROCEDURE — 70450 CT HEAD/BRAIN W/O DYE: CPT | Mod: 26

## 2023-01-01 PROCEDURE — 97162 PT EVAL MOD COMPLEX 30 MIN: CPT

## 2023-01-01 PROCEDURE — 84295 ASSAY OF SERUM SODIUM: CPT

## 2023-01-01 PROCEDURE — 82803 BLOOD GASES ANY COMBINATION: CPT

## 2023-01-01 PROCEDURE — 82140 ASSAY OF AMMONIA: CPT

## 2023-01-01 PROCEDURE — 83550 IRON BINDING TEST: CPT

## 2023-01-01 PROCEDURE — 95714 VEEG EA 12-26 HR UNMNTR: CPT

## 2023-01-01 PROCEDURE — 70450 CT HEAD/BRAIN W/O DYE: CPT | Mod: MA

## 2023-01-01 PROCEDURE — 85018 HEMOGLOBIN: CPT

## 2023-01-01 PROCEDURE — 36430 TRANSFUSION BLD/BLD COMPNT: CPT

## 2023-01-01 PROCEDURE — 71045 X-RAY EXAM CHEST 1 VIEW: CPT

## 2023-01-01 PROCEDURE — 87070 CULTURE OTHR SPECIMN AEROBIC: CPT

## 2023-01-01 PROCEDURE — 99291 CRITICAL CARE FIRST HOUR: CPT | Mod: 25

## 2023-01-01 PROCEDURE — 85610 PROTHROMBIN TIME: CPT

## 2023-01-01 PROCEDURE — 99223 1ST HOSP IP/OBS HIGH 75: CPT

## 2023-01-01 PROCEDURE — 80202 ASSAY OF VANCOMYCIN: CPT

## 2023-01-01 PROCEDURE — 74176 CT ABD & PELVIS W/O CONTRAST: CPT | Mod: MA

## 2023-01-01 PROCEDURE — 87640 STAPH A DNA AMP PROBE: CPT

## 2023-01-01 PROCEDURE — 83735 ASSAY OF MAGNESIUM: CPT

## 2023-01-01 PROCEDURE — 71045 X-RAY EXAM CHEST 1 VIEW: CPT | Mod: 26,76

## 2023-01-01 PROCEDURE — 70486 CT MAXILLOFACIAL W/O DYE: CPT | Mod: MD

## 2023-01-01 PROCEDURE — 97165 OT EVAL LOW COMPLEX 30 MIN: CPT

## 2023-01-01 PROCEDURE — 87040 BLOOD CULTURE FOR BACTERIA: CPT

## 2023-01-01 PROCEDURE — 84100 ASSAY OF PHOSPHORUS: CPT

## 2023-01-01 PROCEDURE — 82330 ASSAY OF CALCIUM: CPT

## 2023-01-01 PROCEDURE — 96374 THER/PROPH/DIAG INJ IV PUSH: CPT

## 2023-01-01 PROCEDURE — 85396 CLOTTING ASSAY WHOLE BLOOD: CPT

## 2023-01-01 PROCEDURE — 80048 BASIC METABOLIC PNL TOTAL CA: CPT

## 2023-01-01 PROCEDURE — 94799 UNLISTED PULMONARY SVC/PX: CPT

## 2023-01-01 PROCEDURE — 84443 ASSAY THYROID STIM HORMONE: CPT

## 2023-01-01 PROCEDURE — 93005 ELECTROCARDIOGRAM TRACING: CPT

## 2023-01-01 PROCEDURE — 80307 DRUG TEST PRSMV CHEM ANLYZR: CPT

## 2023-01-01 PROCEDURE — 96375 TX/PRO/DX INJ NEW DRUG ADDON: CPT

## 2023-01-01 PROCEDURE — 80076 HEPATIC FUNCTION PANEL: CPT

## 2023-01-01 PROCEDURE — 86923 COMPATIBILITY TEST ELECTRIC: CPT

## 2023-01-01 PROCEDURE — 82565 ASSAY OF CREATININE: CPT

## 2023-01-01 PROCEDURE — 36415 COLL VENOUS BLD VENIPUNCTURE: CPT

## 2023-01-01 PROCEDURE — 93010 ELECTROCARDIOGRAM REPORT: CPT

## 2023-01-01 PROCEDURE — 76377 3D RENDER W/INTRP POSTPROCES: CPT

## 2023-01-01 PROCEDURE — 93321 DOPPLER ECHO F-UP/LMTD STD: CPT

## 2023-01-01 PROCEDURE — 92610 EVALUATE SWALLOWING FUNCTION: CPT

## 2023-01-01 PROCEDURE — 84132 ASSAY OF SERUM POTASSIUM: CPT

## 2023-01-01 PROCEDURE — 83036 HEMOGLOBIN GLYCOSYLATED A1C: CPT

## 2023-01-01 PROCEDURE — 94640 AIRWAY INHALATION TREATMENT: CPT

## 2023-01-01 PROCEDURE — 83690 ASSAY OF LIPASE: CPT

## 2023-01-01 PROCEDURE — 99221 1ST HOSP IP/OBS SF/LOW 40: CPT

## 2023-01-01 PROCEDURE — 84436 ASSAY OF TOTAL THYROXINE: CPT

## 2023-01-01 PROCEDURE — 95718 EEG PHYS/QHP 2-12 HR W/VEEG: CPT

## 2023-01-01 PROCEDURE — 99497 ADVNCD CARE PLAN 30 MIN: CPT | Mod: 25

## 2023-01-01 PROCEDURE — 99498 ADVNCD CARE PLAN ADDL 30 MIN: CPT

## 2023-01-01 PROCEDURE — 86850 RBC ANTIBODY SCREEN: CPT

## 2023-01-01 PROCEDURE — 94003 VENT MGMT INPAT SUBQ DAY: CPT

## 2023-01-01 RX ORDER — POTASSIUM CHLORIDE 20 MEQ
20 PACKET (EA) ORAL ONCE
Refills: 0 | Status: COMPLETED | OUTPATIENT
Start: 2023-01-01 | End: 2023-01-01

## 2023-01-01 RX ORDER — MAGNESIUM SULFATE 500 MG/ML
2 VIAL (ML) INJECTION ONCE
Refills: 0 | Status: COMPLETED | OUTPATIENT
Start: 2023-01-01 | End: 2023-01-01

## 2023-01-01 RX ORDER — HYDROMORPHONE HYDROCHLORIDE 2 MG/ML
0.25 INJECTION INTRAMUSCULAR; INTRAVENOUS; SUBCUTANEOUS ONCE
Refills: 0 | Status: DISCONTINUED | OUTPATIENT
Start: 2023-01-01 | End: 2023-01-01

## 2023-01-01 RX ORDER — ASCORBIC ACID 60 MG
500 TABLET,CHEWABLE ORAL EVERY 24 HOURS
Refills: 0 | Status: DISCONTINUED | OUTPATIENT
Start: 2023-01-01 | End: 2023-01-01

## 2023-01-01 RX ORDER — MORPHINE SULFATE 50 MG/1
1 CAPSULE, EXTENDED RELEASE ORAL
Qty: 100 | Refills: 0 | Status: DISCONTINUED | OUTPATIENT
Start: 2023-01-01 | End: 2023-01-01

## 2023-01-01 RX ORDER — METOPROLOL TARTRATE 50 MG
12.5 TABLET ORAL EVERY 12 HOURS
Refills: 0 | Status: DISCONTINUED | OUTPATIENT
Start: 2023-01-01 | End: 2023-01-01

## 2023-01-01 RX ORDER — HYDROMORPHONE HYDROCHLORIDE 2 MG/ML
0.25 INJECTION INTRAMUSCULAR; INTRAVENOUS; SUBCUTANEOUS
Refills: 0 | Status: DISCONTINUED | OUTPATIENT
Start: 2023-01-01 | End: 2023-01-01

## 2023-01-01 RX ORDER — FUROSEMIDE 40 MG
20 TABLET ORAL ONCE
Refills: 0 | Status: COMPLETED | OUTPATIENT
Start: 2023-01-01 | End: 2023-01-01

## 2023-01-01 RX ORDER — HALOPERIDOL DECANOATE 100 MG/ML
1 INJECTION INTRAMUSCULAR ONCE
Refills: 0 | Status: COMPLETED | OUTPATIENT
Start: 2023-01-01 | End: 2023-01-01

## 2023-01-01 RX ORDER — ERYTHROPOIETIN 10000 [IU]/ML
10000 INJECTION, SOLUTION INTRAVENOUS; SUBCUTANEOUS
Refills: 0 | Status: DISCONTINUED | OUTPATIENT
Start: 2023-01-01 | End: 2023-01-01

## 2023-01-01 RX ORDER — IRON SUCROSE 20 MG/ML
200 INJECTION, SOLUTION INTRAVENOUS EVERY 24 HOURS
Refills: 0 | Status: DISCONTINUED | OUTPATIENT
Start: 2023-01-01 | End: 2023-01-01

## 2023-01-01 RX ORDER — FUROSEMIDE 40 MG
40 TABLET ORAL ONCE
Refills: 0 | Status: COMPLETED | OUTPATIENT
Start: 2023-01-01 | End: 2023-01-01

## 2023-01-01 RX ORDER — ATORVASTATIN CALCIUM 80 MG/1
20 TABLET, FILM COATED ORAL AT BEDTIME
Refills: 0 | Status: DISCONTINUED | OUTPATIENT
Start: 2023-01-01 | End: 2023-01-01

## 2023-01-01 RX ORDER — SODIUM CHLORIDE 9 MG/ML
1000 INJECTION, SOLUTION INTRAVENOUS
Refills: 0 | Status: DISCONTINUED | OUTPATIENT
Start: 2023-01-01 | End: 2023-01-01

## 2023-01-01 RX ORDER — MORPHINE SULFATE 50 MG/1
2 CAPSULE, EXTENDED RELEASE ORAL ONCE
Refills: 0 | Status: DISCONTINUED | OUTPATIENT
Start: 2023-01-01 | End: 2023-01-01

## 2023-01-01 RX ORDER — ACETAMINOPHEN 500 MG
750 TABLET ORAL EVERY 6 HOURS
Refills: 0 | Status: DISCONTINUED | OUTPATIENT
Start: 2023-01-01 | End: 2023-01-01

## 2023-01-01 RX ORDER — FUROSEMIDE 40 MG
20 TABLET ORAL DAILY
Refills: 0 | Status: DISCONTINUED | OUTPATIENT
Start: 2023-01-01 | End: 2023-01-01

## 2023-01-01 RX ORDER — ROBINUL 0.2 MG/ML
0.4 INJECTION INTRAMUSCULAR; INTRAVENOUS EVERY 4 HOURS
Refills: 0 | Status: DISCONTINUED | OUTPATIENT
Start: 2023-01-01 | End: 2023-01-01

## 2023-01-01 RX ORDER — FUROSEMIDE 40 MG
40 TABLET ORAL DAILY
Refills: 0 | Status: DISCONTINUED | OUTPATIENT
Start: 2023-01-01 | End: 2023-01-01

## 2023-01-01 RX ORDER — PREGABALIN 225 MG/1
1000 CAPSULE ORAL EVERY 24 HOURS
Refills: 0 | Status: DISCONTINUED | OUTPATIENT
Start: 2023-01-01 | End: 2023-01-01

## 2023-01-01 RX ORDER — ACETYLCYSTEINE 200 MG/ML
4 VIAL (ML) MISCELLANEOUS EVERY 6 HOURS
Refills: 0 | Status: DISCONTINUED | OUTPATIENT
Start: 2023-01-01 | End: 2023-01-01

## 2023-01-01 RX ORDER — SODIUM BICARBONATE 1 MEQ/ML
1300 SYRINGE (ML) INTRAVENOUS EVERY 8 HOURS
Refills: 0 | Status: DISCONTINUED | OUTPATIENT
Start: 2023-01-01 | End: 2023-01-01

## 2023-01-01 RX ORDER — ACETAMINOPHEN 500 MG
650 TABLET ORAL EVERY 6 HOURS
Refills: 0 | Status: DISCONTINUED | OUTPATIENT
Start: 2023-01-01 | End: 2023-01-01

## 2023-01-01 RX ORDER — ROBINUL 0.2 MG/ML
0.4 INJECTION INTRAMUSCULAR; INTRAVENOUS ONCE
Refills: 0 | Status: COMPLETED | OUTPATIENT
Start: 2023-01-01 | End: 2023-01-01

## 2023-01-01 RX ORDER — MORPHINE SULFATE 50 MG/1
2 CAPSULE, EXTENDED RELEASE ORAL
Qty: 100 | Refills: 0 | Status: DISCONTINUED | OUTPATIENT
Start: 2023-01-01 | End: 2023-01-01

## 2023-01-01 RX ORDER — CHLORHEXIDINE GLUCONATE 213 G/1000ML
1 SOLUTION TOPICAL
Refills: 0 | Status: DISCONTINUED | OUTPATIENT
Start: 2023-01-01 | End: 2023-01-01

## 2023-01-01 RX ORDER — LANOLIN ALCOHOL/MO/W.PET/CERES
3 CREAM (GRAM) TOPICAL AT BEDTIME
Refills: 0 | Status: DISCONTINUED | OUTPATIENT
Start: 2023-01-01 | End: 2023-01-01

## 2023-01-01 RX ORDER — METOPROLOL TARTRATE 50 MG
12.5 TABLET ORAL ONCE
Refills: 0 | Status: DISCONTINUED | OUTPATIENT
Start: 2023-01-01 | End: 2023-01-01

## 2023-01-01 RX ORDER — ALLOPURINOL 300 MG
100 TABLET ORAL DAILY
Refills: 0 | Status: DISCONTINUED | OUTPATIENT
Start: 2023-01-01 | End: 2023-01-01

## 2023-01-01 RX ORDER — SOD SULF/SODIUM/NAHCO3/KCL/PEG
4000 SOLUTION, RECONSTITUTED, ORAL ORAL ONCE
Refills: 0 | Status: DISCONTINUED | OUTPATIENT
Start: 2023-01-01 | End: 2023-01-01

## 2023-01-01 RX ORDER — ASPIRIN/CALCIUM CARB/MAGNESIUM 324 MG
81 TABLET ORAL DAILY
Refills: 0 | Status: DISCONTINUED | OUTPATIENT
Start: 2023-01-01 | End: 2023-01-01

## 2023-01-01 RX ORDER — INSULIN LISPRO 100/ML
VIAL (ML) SUBCUTANEOUS EVERY 6 HOURS
Refills: 0 | Status: DISCONTINUED | OUTPATIENT
Start: 2023-01-01 | End: 2023-01-01

## 2023-01-01 RX ORDER — HEPARIN SODIUM 5000 [USP'U]/ML
5000 INJECTION INTRAVENOUS; SUBCUTANEOUS EVERY 8 HOURS
Refills: 0 | Status: DISCONTINUED | OUTPATIENT
Start: 2023-01-01 | End: 2023-01-01

## 2023-01-01 RX ORDER — FOLIC ACID 0.8 MG
1 TABLET ORAL EVERY 24 HOURS
Refills: 0 | Status: DISCONTINUED | OUTPATIENT
Start: 2023-01-01 | End: 2023-01-01

## 2023-01-01 RX ORDER — FUROSEMIDE 40 MG
80 TABLET ORAL ONCE
Refills: 0 | Status: COMPLETED | OUTPATIENT
Start: 2023-01-01 | End: 2023-01-01

## 2023-01-01 RX ORDER — FOLIC ACID 0.8 MG
1 TABLET ORAL DAILY
Refills: 0 | Status: DISCONTINUED | OUTPATIENT
Start: 2023-01-01 | End: 2023-01-01

## 2023-01-01 RX ORDER — DEXMEDETOMIDINE HYDROCHLORIDE IN 0.9% SODIUM CHLORIDE 4 UG/ML
0.1 INJECTION INTRAVENOUS
Qty: 200 | Refills: 0 | Status: DISCONTINUED | OUTPATIENT
Start: 2023-01-01 | End: 2023-01-01

## 2023-01-01 RX ORDER — IPRATROPIUM/ALBUTEROL SULFATE 18-103MCG
3 AEROSOL WITH ADAPTER (GRAM) INHALATION EVERY 6 HOURS
Refills: 0 | Status: DISCONTINUED | OUTPATIENT
Start: 2023-01-01 | End: 2023-01-01

## 2023-01-01 RX ORDER — ENOXAPARIN SODIUM 100 MG/ML
30 INJECTION SUBCUTANEOUS EVERY 24 HOURS
Refills: 0 | Status: DISCONTINUED | OUTPATIENT
Start: 2023-01-01 | End: 2023-01-01

## 2023-01-01 RX ADMIN — ENOXAPARIN SODIUM 30 MILLIGRAM(S): 100 INJECTION SUBCUTANEOUS at 12:38

## 2023-01-01 RX ADMIN — Medication 30 MEQ/KG/HR: at 02:15

## 2023-01-01 RX ADMIN — MEROPENEM 100 MILLIGRAM(S): 1 INJECTION INTRAVENOUS at 13:16

## 2023-01-01 RX ADMIN — ROBINUL 0.4 MILLIGRAM(S): 0.2 INJECTION INTRAMUSCULAR; INTRAVENOUS at 03:04

## 2023-01-01 RX ADMIN — Medication 3 MILLIGRAM(S): at 21:12

## 2023-01-01 RX ADMIN — Medication 1 APPLICATION(S): at 17:16

## 2023-01-01 RX ADMIN — MEROPENEM 100 MILLIGRAM(S): 1 INJECTION INTRAVENOUS at 01:40

## 2023-01-01 RX ADMIN — Medication 20 MILLIGRAM(S): at 12:38

## 2023-01-01 RX ADMIN — MEROPENEM 100 MILLIGRAM(S): 1 INJECTION INTRAVENOUS at 14:02

## 2023-01-01 RX ADMIN — Medication 1 MILLIGRAM(S): at 12:59

## 2023-01-01 RX ADMIN — Medication 1 DROP(S): at 05:05

## 2023-01-01 RX ADMIN — Medication 1 DROP(S): at 17:15

## 2023-01-01 RX ADMIN — ENOXAPARIN SODIUM 30 MILLIGRAM(S): 100 INJECTION SUBCUTANEOUS at 11:23

## 2023-01-01 RX ADMIN — CHLORHEXIDINE GLUCONATE 15 MILLILITER(S): 213 SOLUTION TOPICAL at 05:05

## 2023-01-01 RX ADMIN — IRON SUCROSE 200 MILLIGRAM(S): 20 INJECTION, SOLUTION INTRAVENOUS at 21:34

## 2023-01-01 RX ADMIN — Medication 1 SPRAY(S): at 13:00

## 2023-01-01 RX ADMIN — Medication 1 DROP(S): at 23:19

## 2023-01-01 RX ADMIN — Medication 1 APPLICATION(S): at 16:48

## 2023-01-01 RX ADMIN — MORPHINE SULFATE 2 MG/HR: 50 CAPSULE, EXTENDED RELEASE ORAL at 00:21

## 2023-01-01 RX ADMIN — Medication 1 APPLICATION(S): at 05:05

## 2023-01-01 RX ADMIN — SODIUM CHLORIDE 30 MILLILITER(S): 9 INJECTION, SOLUTION INTRAVENOUS at 21:19

## 2023-01-01 RX ADMIN — MEROPENEM 100 MILLIGRAM(S): 1 INJECTION INTRAVENOUS at 14:08

## 2023-01-01 RX ADMIN — Medication 3 MILLILITER(S): at 12:43

## 2023-01-01 RX ADMIN — Medication 3 MILLILITER(S): at 05:25

## 2023-01-01 RX ADMIN — Medication 40 MILLIGRAM(S): at 10:36

## 2023-01-01 RX ADMIN — Medication 1 DROP(S): at 00:30

## 2023-01-01 RX ADMIN — Medication 1 APPLICATION(S): at 17:01

## 2023-01-01 RX ADMIN — Medication 1 DROP(S): at 05:47

## 2023-01-01 RX ADMIN — ROBINUL 0.4 MILLIGRAM(S): 0.2 INJECTION INTRAMUSCULAR; INTRAVENOUS at 15:37

## 2023-01-01 RX ADMIN — Medication 1300 MILLIGRAM(S): at 13:16

## 2023-01-01 RX ADMIN — Medication 1 SPRAY(S): at 12:25

## 2023-01-01 RX ADMIN — IRON SUCROSE 200 MILLIGRAM(S): 20 INJECTION, SOLUTION INTRAVENOUS at 21:01

## 2023-01-01 RX ADMIN — Medication 1 APPLICATION(S): at 17:31

## 2023-01-01 RX ADMIN — SODIUM CHLORIDE 30 MILLILITER(S): 9 INJECTION, SOLUTION INTRAVENOUS at 20:43

## 2023-01-01 RX ADMIN — Medication 1 MILLIGRAM(S): at 21:34

## 2023-01-01 RX ADMIN — Medication 40 MILLIGRAM(S): at 22:00

## 2023-01-01 RX ADMIN — SODIUM CHLORIDE 30 MILLILITER(S): 9 INJECTION, SOLUTION INTRAVENOUS at 21:35

## 2023-01-01 RX ADMIN — PANTOPRAZOLE SODIUM 40 MILLIGRAM(S): 20 TABLET, DELAYED RELEASE ORAL at 11:53

## 2023-01-01 RX ADMIN — Medication 500 MILLIGRAM(S): at 23:18

## 2023-01-01 RX ADMIN — Medication 12.5 MILLIGRAM(S): at 18:48

## 2023-01-01 RX ADMIN — DEXMEDETOMIDINE HYDROCHLORIDE IN 0.9% SODIUM CHLORIDE 1.5 MICROGRAM(S)/KG/HR: 4 INJECTION INTRAVENOUS at 23:49

## 2023-01-01 RX ADMIN — ERYTHROPOIETIN 10000 UNIT(S): 10000 INJECTION, SOLUTION INTRAVENOUS; SUBCUTANEOUS at 11:16

## 2023-01-01 RX ADMIN — CHLORHEXIDINE GLUCONATE 15 MILLILITER(S): 213 SOLUTION TOPICAL at 05:17

## 2023-01-01 RX ADMIN — Medication 1 DROP(S): at 17:40

## 2023-01-01 RX ADMIN — Medication 1300 MILLIGRAM(S): at 05:09

## 2023-01-01 RX ADMIN — Medication 1300 MILLIGRAM(S): at 21:33

## 2023-01-01 RX ADMIN — MORPHINE SULFATE 2 MILLIGRAM(S): 50 CAPSULE, EXTENDED RELEASE ORAL at 04:00

## 2023-01-01 RX ADMIN — Medication 1 DROP(S): at 11:12

## 2023-01-01 RX ADMIN — ENOXAPARIN SODIUM 30 MILLIGRAM(S): 100 INJECTION SUBCUTANEOUS at 10:35

## 2023-01-01 RX ADMIN — Medication 21.4 MICROGRAM(S)/KG/MIN: at 07:33

## 2023-01-01 RX ADMIN — ROBINUL 0.4 MILLIGRAM(S): 0.2 INJECTION INTRAMUSCULAR; INTRAVENOUS at 13:12

## 2023-01-01 RX ADMIN — Medication 20 MILLIGRAM(S): at 05:52

## 2023-01-01 RX ADMIN — MORPHINE SULFATE 1 MG/HR: 50 CAPSULE, EXTENDED RELEASE ORAL at 14:13

## 2023-01-01 RX ADMIN — HALOPERIDOL DECANOATE 1 MILLIGRAM(S): 100 INJECTION INTRAMUSCULAR at 13:39

## 2023-01-01 RX ADMIN — CHLORHEXIDINE GLUCONATE 15 MILLILITER(S): 213 SOLUTION TOPICAL at 17:30

## 2023-01-01 RX ADMIN — Medication 1 DROP(S): at 05:52

## 2023-01-01 RX ADMIN — HYDROMORPHONE HYDROCHLORIDE 0.25 MILLIGRAM(S): 2 INJECTION INTRAMUSCULAR; INTRAVENOUS; SUBCUTANEOUS at 03:50

## 2023-01-01 RX ADMIN — HYDROMORPHONE HYDROCHLORIDE 0.25 MILLIGRAM(S): 2 INJECTION INTRAMUSCULAR; INTRAVENOUS; SUBCUTANEOUS at 11:16

## 2023-01-01 RX ADMIN — Medication 40 MILLIGRAM(S): at 09:11

## 2023-01-01 RX ADMIN — Medication 1 DROP(S): at 11:17

## 2023-01-01 RX ADMIN — Medication 650 MILLIGRAM(S): at 09:25

## 2023-01-01 RX ADMIN — Medication 1 APPLICATION(S): at 05:09

## 2023-01-01 RX ADMIN — Medication 1 DROP(S): at 17:33

## 2023-01-01 RX ADMIN — Medication 81 MILLIGRAM(S): at 13:01

## 2023-01-01 RX ADMIN — ENOXAPARIN SODIUM 30 MILLIGRAM(S): 100 INJECTION SUBCUTANEOUS at 13:01

## 2023-01-01 RX ADMIN — ENOXAPARIN SODIUM 30 MILLIGRAM(S): 100 INJECTION SUBCUTANEOUS at 11:53

## 2023-01-01 RX ADMIN — Medication 1 SPRAY(S): at 11:12

## 2023-01-01 RX ADMIN — MORPHINE SULFATE 1 MG/HR: 50 CAPSULE, EXTENDED RELEASE ORAL at 04:26

## 2023-01-01 RX ADMIN — Medication 40 MILLIGRAM(S): at 21:33

## 2023-01-01 RX ADMIN — Medication 1 DROP(S): at 13:00

## 2023-01-01 RX ADMIN — ERYTHROPOIETIN 10000 UNIT(S): 10000 INJECTION, SOLUTION INTRAVENOUS; SUBCUTANEOUS at 11:10

## 2023-01-01 RX ADMIN — Medication 4.5 MICROGRAM(S)/KG/MIN: at 07:33

## 2023-01-01 RX ADMIN — SODIUM CHLORIDE 50 MILLILITER(S): 9 INJECTION, SOLUTION INTRAVENOUS at 01:59

## 2023-01-01 RX ADMIN — Medication 1 SPRAY(S): at 12:38

## 2023-01-01 RX ADMIN — Medication 1 MILLIGRAM(S): at 21:55

## 2023-01-01 RX ADMIN — Medication 1 MILLIGRAM(S): at 21:02

## 2023-01-01 RX ADMIN — Medication 1 MILLIGRAM(S): at 21:01

## 2023-01-01 RX ADMIN — Medication 81 MILLIGRAM(S): at 12:59

## 2023-01-01 RX ADMIN — MORPHINE SULFATE 2 MILLIGRAM(S): 50 CAPSULE, EXTENDED RELEASE ORAL at 03:45

## 2023-01-01 RX ADMIN — HALOPERIDOL DECANOATE 1 MILLIGRAM(S): 100 INJECTION INTRAMUSCULAR at 21:16

## 2023-01-01 RX ADMIN — Medication 1 DROP(S): at 17:30

## 2023-01-01 RX ADMIN — HYDROMORPHONE HYDROCHLORIDE 0.25 MILLIGRAM(S): 2 INJECTION INTRAMUSCULAR; INTRAVENOUS; SUBCUTANEOUS at 11:46

## 2023-01-01 RX ADMIN — ENOXAPARIN SODIUM 30 MILLIGRAM(S): 100 INJECTION SUBCUTANEOUS at 10:37

## 2023-01-01 RX ADMIN — Medication 1 APPLICATION(S): at 17:39

## 2023-01-01 RX ADMIN — Medication 1 MILLIGRAM(S): at 13:01

## 2023-01-01 RX ADMIN — MEROPENEM 100 MILLIGRAM(S): 1 INJECTION INTRAVENOUS at 02:58

## 2023-01-01 RX ADMIN — CHLORHEXIDINE GLUCONATE 1 APPLICATION(S): 213 SOLUTION TOPICAL at 05:05

## 2023-01-01 RX ADMIN — Medication 30 MEQ/KG/HR: at 07:33

## 2023-01-01 RX ADMIN — HYDROMORPHONE HYDROCHLORIDE 0.25 MILLIGRAM(S): 2 INJECTION INTRAMUSCULAR; INTRAVENOUS; SUBCUTANEOUS at 17:10

## 2023-01-01 RX ADMIN — Medication 4.5 MICROGRAM(S)/KG/MIN: at 02:15

## 2023-01-01 RX ADMIN — HYDROMORPHONE HYDROCHLORIDE 0.25 MILLIGRAM(S): 2 INJECTION INTRAMUSCULAR; INTRAVENOUS; SUBCUTANEOUS at 01:34

## 2023-01-01 RX ADMIN — ATORVASTATIN CALCIUM 20 MILLIGRAM(S): 80 TABLET, FILM COATED ORAL at 21:20

## 2023-01-01 RX ADMIN — MEROPENEM 100 MILLIGRAM(S): 1 INJECTION INTRAVENOUS at 02:24

## 2023-01-01 RX ADMIN — Medication 1 DROP(S): at 12:38

## 2023-01-01 RX ADMIN — ENOXAPARIN SODIUM 30 MILLIGRAM(S): 100 INJECTION SUBCUTANEOUS at 12:59

## 2023-01-01 RX ADMIN — Medication 1 DROP(S): at 12:25

## 2023-01-01 RX ADMIN — HYDROMORPHONE HYDROCHLORIDE 0.25 MILLIGRAM(S): 2 INJECTION INTRAMUSCULAR; INTRAVENOUS; SUBCUTANEOUS at 16:55

## 2023-01-01 RX ADMIN — Medication 20 MILLIGRAM(S): at 12:59

## 2023-01-01 RX ADMIN — CHLORHEXIDINE GLUCONATE 1 APPLICATION(S): 213 SOLUTION TOPICAL at 05:48

## 2023-01-01 RX ADMIN — Medication 1 APPLICATION(S): at 17:50

## 2023-01-01 RX ADMIN — HYDROMORPHONE HYDROCHLORIDE 0.25 MILLIGRAM(S): 2 INJECTION INTRAMUSCULAR; INTRAVENOUS; SUBCUTANEOUS at 03:35

## 2023-01-01 RX ADMIN — Medication 1 SPRAY(S): at 11:17

## 2023-01-01 RX ADMIN — Medication 20 MILLIGRAM(S): at 09:24

## 2023-01-01 RX ADMIN — HYDROMORPHONE HYDROCHLORIDE 0.25 MILLIGRAM(S): 2 INJECTION INTRAMUSCULAR; INTRAVENOUS; SUBCUTANEOUS at 01:19

## 2023-01-01 RX ADMIN — SODIUM CHLORIDE 30 MILLILITER(S): 9 INJECTION, SOLUTION INTRAVENOUS at 10:36

## 2023-01-01 RX ADMIN — SODIUM CHLORIDE 30 MILLILITER(S): 9 INJECTION, SOLUTION INTRAVENOUS at 09:10

## 2023-01-01 RX ADMIN — IRON SUCROSE 200 MILLIGRAM(S): 20 INJECTION, SOLUTION INTRAVENOUS at 21:02

## 2023-01-01 RX ADMIN — Medication 1 APPLICATION(S): at 05:47

## 2023-01-01 RX ADMIN — Medication 1 APPLICATION(S): at 05:25

## 2023-01-01 RX ADMIN — Medication 12.5 MILLIGRAM(S): at 05:09

## 2023-01-01 RX ADMIN — Medication 3 MILLIGRAM(S): at 23:49

## 2023-01-01 RX ADMIN — Medication 650 MILLIGRAM(S): at 08:27

## 2023-01-01 RX ADMIN — CHLORHEXIDINE GLUCONATE 1 APPLICATION(S): 213 SOLUTION TOPICAL at 05:25

## 2023-01-01 RX ADMIN — Medication 2: at 01:11

## 2023-01-01 RX ADMIN — Medication 100 MILLIEQUIVALENT(S): at 05:14

## 2023-01-01 RX ADMIN — Medication 1 SPRAY(S): at 11:54

## 2023-01-01 RX ADMIN — Medication 1 DROP(S): at 23:50

## 2023-01-01 RX ADMIN — Medication 1 DROP(S): at 23:30

## 2023-01-01 RX ADMIN — Medication 25 GRAM(S): at 13:39

## 2023-01-01 RX ADMIN — Medication 1 DROP(S): at 05:25

## 2023-01-01 RX ADMIN — Medication 1 APPLICATION(S): at 05:52

## 2023-01-01 RX ADMIN — MORPHINE SULFATE 1 MG/HR: 50 CAPSULE, EXTENDED RELEASE ORAL at 04:04

## 2023-01-01 RX ADMIN — Medication 3 MILLILITER(S): at 00:28

## 2023-01-01 RX ADMIN — Medication 1 APPLICATION(S): at 17:33

## 2023-01-01 RX ADMIN — Medication 1 MILLIGRAM(S): at 21:19

## 2023-01-01 RX ADMIN — IRON SUCROSE 200 MILLIGRAM(S): 20 INJECTION, SOLUTION INTRAVENOUS at 21:55

## 2023-01-01 RX ADMIN — CHLORHEXIDINE GLUCONATE 1 APPLICATION(S): 213 SOLUTION TOPICAL at 05:08

## 2023-01-01 RX ADMIN — MEROPENEM 100 MILLIGRAM(S): 1 INJECTION INTRAVENOUS at 01:11

## 2023-01-01 RX ADMIN — CHLORHEXIDINE GLUCONATE 1 APPLICATION(S): 213 SOLUTION TOPICAL at 06:52

## 2023-01-01 RX ADMIN — Medication 1 DROP(S): at 23:14

## 2023-01-01 RX ADMIN — PREGABALIN 1000 MICROGRAM(S): 225 CAPSULE ORAL at 21:33

## 2023-01-01 RX ADMIN — Medication 1 APPLICATION(S): at 06:52

## 2023-01-01 RX ADMIN — Medication 1 DROP(S): at 05:10

## 2023-01-01 RX ADMIN — Medication 1 DROP(S): at 06:52

## 2023-01-01 RX ADMIN — CHLORHEXIDINE GLUCONATE 1 APPLICATION(S): 213 SOLUTION TOPICAL at 05:30

## 2023-01-01 RX ADMIN — MORPHINE SULFATE 1 MG/HR: 50 CAPSULE, EXTENDED RELEASE ORAL at 07:51

## 2023-01-01 RX ADMIN — Medication 1 DROP(S): at 05:16

## 2023-01-01 RX ADMIN — Medication 1 DROP(S): at 11:54

## 2023-01-01 RX ADMIN — Medication 1 DROP(S): at 17:47

## 2023-01-01 RX ADMIN — Medication 80 MILLIGRAM(S): at 14:02

## 2023-01-01 RX ADMIN — PANTOPRAZOLE SODIUM 40 MILLIGRAM(S): 20 TABLET, DELAYED RELEASE ORAL at 11:21

## 2023-01-01 RX ADMIN — MORPHINE SULFATE 1 MG/HR: 50 CAPSULE, EXTENDED RELEASE ORAL at 08:15

## 2023-01-01 RX ADMIN — Medication 1 DROP(S): at 00:00

## 2023-01-01 RX ADMIN — Medication 1 APPLICATION(S): at 05:11

## 2023-01-01 RX ADMIN — Medication 1 DROP(S): at 16:48

## 2023-01-01 RX ADMIN — Medication 100 MILLIGRAM(S): at 11:23

## 2023-01-01 RX ADMIN — MORPHINE SULFATE 1 MG/HR: 50 CAPSULE, EXTENDED RELEASE ORAL at 13:58

## 2023-01-01 RX ADMIN — CHLORHEXIDINE GLUCONATE 1 APPLICATION(S): 213 SOLUTION TOPICAL at 05:52

## 2023-01-01 RX ADMIN — ROBINUL 0.4 MILLIGRAM(S): 0.2 INJECTION INTRAMUSCULAR; INTRAVENOUS at 19:52

## 2023-01-01 RX ADMIN — ERYTHROPOIETIN 10000 UNIT(S): 10000 INJECTION, SOLUTION INTRAVENOUS; SUBCUTANEOUS at 15:50

## 2023-01-01 RX ADMIN — CHLORHEXIDINE GLUCONATE 1 APPLICATION(S): 213 SOLUTION TOPICAL at 05:10

## 2023-01-01 RX ADMIN — Medication 1 DROP(S): at 17:01

## 2023-01-01 RX ADMIN — ATORVASTATIN CALCIUM 20 MILLIGRAM(S): 80 TABLET, FILM COATED ORAL at 21:00

## 2023-01-01 RX ADMIN — Medication 40 MILLIGRAM(S): at 13:15

## 2023-01-01 NOTE — PROGRESS NOTE ADULT - ASSESSMENT
Assessment: Patient is a 89 y/o male w/ a PMHx of congenital deafness (communicates w/ ASL), CAD s/p triple vessel CABG, s/p biventricular PPM, HTN, HLD, CKD stage IV, BPH, gout, and anemia presenting after being found down outside hypothermic, hypotensive, and altered requiring intubation AMS w/ imaging revealing bilateral nasal bone and nasal septum fractures. Patient admits to SICU for shock, ?septic, hypothermic.     Neuro: altered mental status, intubated  -MS: opens eyes to touch, moves extr  - CT head negative for any acute pathology   - Multimodal pain control w/ standing IV acetaminophen and low dose PRN Dilaudid IVP    Resp: intubated for altered mental status, bilateral pleural effusions  - Mechanical ventilation for as patient was intubated for altered mental status  - Will SBT w/ goal to extubate when patient is awake & able to follow commands      CV: Shock, distributive? 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   -Lactate is cleared    GI: no acute issues  - tube feeds via OG   - Protonix for stress ulcer prophylaxis    Renal: Acute on CKD stage IV (baseline Cr 2-3)  - s/p 1L fluid and 2 units of blood   - bicarb gtt 150mEq @30cc/hr started for acidosis    Heme:   - Monitor CBC and coags  - VTE ppx: HSQ  - SCDs    ID: COVID-19 infection  - Will not give remdesivir for now given patient's LUCY  - currenlty on meropenem: emp  - cultures sent, UA neg, one bottle G+cocci in chains and pair, received 1d ose of vanco  - repeat blood cultures  - daily vanc levels    Endo: hyperglycemia  - Monitor glucose w/ ISS q6hrs for glycemic control  - HgbA1C 5.6%    MSK: bilateral nasal bone & nasal septum fracture, facial lacerations  - Outpatient follow-up w/ plastics  - Sinus precautions  - Facial lacerations repaired w/ Nylon sutures on 12/29    GOC:  -full code

## 2023-01-01 NOTE — PROGRESS NOTE ADULT - ATTENDING COMMENTS
89 y/o male w/ a PMHx of CAD s/p triple vessel CABG, HTN, HLD, CKD stage IV, BPH, gout, and anemia who presented as a level 1 trauma after being found down outside. As per the son, patient went to bed around 2130 yesterday and was found down outside around 0600 w/ his walker. According to son he is normal A&Ox4 but had been intermittently lethargic & confused after he was found to be COVID-19 positive on .   Injuries include facial lac and fx. Pt hypothermic    Open eyes, does not follow, EEG neg, concern for anoxic brain injury, will repeat CTH  Acute resp failure post trauma, pt has good gas exchange, extubation on hold for poor mental status  Off Levo, will wean off  sec to short run PVC/v tach. Will start BB as tolerated  ECHO EF 20%  NGT feed at goal  BC grew Strep mitis probably contaminant, repeat cultures neg. DC Vanco, continue Meropenem  Pt COVID +, not a candidate for Remdesivir sec to CKD  one dose Lasix, DC IVF with bicarb, start NGT bicarb  ISS/HbA1C  DVT ppx with renally adjusted dose Lovenox, monitor drop in HCT, Fe study

## 2023-01-01 NOTE — PROGRESS NOTE ADULT - SUBJECTIVE AND OBJECTIVE BOX
24 HOUR EVENTS:  - eeg prelim no epileptiform activity  - glideoscope assisted OG tube inserted, feeds started   -  increased to 5, patient developed runs of ectopy, reduced back to 2.5.        HISTORY  Patient is a 89 y/o male w/ a PMHx of congenital deafness (communicates w/ ASL), CAD s/p triple vessel CABG, s/p biventricular PPM, HTN, HLD, CKD stage IV, BPH, gout, and anemia who presented as a level 1 trauma after being found down outside. As per the son, patient went to bed around 2130 yesterday and was found down outside around 0600 w/ his walker. There are two steps that lead outdoor. Of note, son states that he is normally A&Ox4 but had been intermittently lethargic & confused after he was found to be COVID-19 positive on . In the ED, GCS was 10 but mental status was progressively worsening so he was intubated for airway protection. He was also hypothermic to 25° C and hypotensive w/ BP in the 60s/40s that improved to the 110s/70s after a liter bolus of warm crystalloid but he became hypotensive again so vasopressor support was initiated and a right femoral arterial line was placed.       SUBJECTIVE/ROS:  [x ] A ten-point review of systems was otherwise negative except as noted.  [ ] Due to altered mental status/intubation, subjective information were not able to be obtained from the patient. History was obtained, to the extent possible, from review of the chart and collateral sources of information.      NEURO  Exam: intubated, deaf, moves extr  Meds: acetaminophen   IVPB .. 1000 milliGRAM(s) IV Intermittent every 6 hours  dexMEDEtomidine Infusion 0.2 MICROgram(s)/kG/Hr IV Continuous <Continuous>  HYDROmorphone  Injectable 0.25 milliGRAM(s) IV Push every 4 hours PRN Severe Pain (7 - 10)    [x] Adequacy of sedation and pain control has been assessed and adjusted      RESPIRATORY  RR: 18 (2023 00:30) (15 - 36)  SpO2: 98% (2023 00:30) (91% - 100%)    O2 Parameters below as of 31 Dec 2022 19:00  Patient On (Oxygen Delivery Method): ventilator      [N/A] Extubation Readiness Assessed      CARDIOVASCULAR  ICU Vital Signs Last 24 Hrs  T(C): 36.9 (31 Dec 2022 23:00), Max: 36.9 (31 Dec 2022 23:00)  T(F): 98.4 (31 Dec 2022 23:00), Max: 98.4 (31 Dec 2022 23:00)  HR: 60 (2023 00:30) (59 - 79)  BP: 105/51 (2023 00:30) (92/72 - 134/63)  BP(mean): 74 (2023 00:30) (69 - 90)  ABP: 116/40 (31 Dec 2022 19:00) (64/39 - 138/110)  ABP(mean): 63 (31 Dec 2022 19:00) (51 - 124)      Exam: regular rate and rhythm  Cardiac Rhythm: sinus  Perfusion     [x]Adequate   [ ]Inadequate  Mentation   [x]Normal       [ ]Reduced  Extremities  [x]Warm         [ ]Cool  Volume Status [ ]Hypervolemic [x]Euvolemic [ ]Hypovolemic  Meds: DOBUTamine Infusion 2.5 MICROgram(s)/kG/Min IV Continuous <Continuous>  norepinephrine Infusion 0.19 MICROgram(s)/kG/Min IV Continuous <Continuous>        GI/NUTRITION  Exam: soft, nontender, nondistended, incision C/D/I  Diet: npo  Meds: pantoprazole  Injectable 40 milliGRAM(s) IV Push daily      GENITOURINARY  I&O's Detail    30 Dec 2022 07:01  -  31 Dec 2022 07:00  --------------------------------------------------------  IN:    Dexmedetomidine: 3 mL    DOBUTamine: 78.8 mL    IV PiggyBack: 300 mL    IV PiggyBack: 100 mL    Norepinephrine: 96 mL    Norepinephrine: 69.1 mL    Sodium Bicarbonate: 800 mL    Sodium Bicarbonate: 45 mL    sodium chloride 0.9%: 600 mL    Vasopressin: 103.5 mL  Total IN: 2195.4 mL    OUT:    Indwelling Catheter - Urethral (mL): 590 mL  Total OUT: 590 mL    Total NET: 1605.4 mL      31 Dec 2022 07:01  -  2023 00:47  --------------------------------------------------------  IN:    DOBUTamine: 4.5 mL    DOBUTamine: 54 mL    DOBUTamine: 40.5 mL    Enteral Tube Flush: 30 mL    IV PiggyBack: 50 mL    IV PiggyBack: 100 mL    Jevity 1.5: 30 mL    Norepinephrine: 73.1 mL    Sodium Bicarbonate: 50 mL    Sodium Bicarbonate: 450 mL  Total IN: 882.1 mL    OUT:    Indwelling Catheter - Urethral (mL): 525 mL  Total OUT: 525 mL    Total NET: 357.1 mL    [x ] Davis catheter, indication: N/A  Meds: sodium bicarbonate  Infusion 0.125 mEq/kG/Hr IV Continuous <Continuous>        HEMATOLOGIC  Meds: enoxaparin Injectable 30 milliGRAM(s) SubCutaneous every 24 hours    [x] VTE Prophylaxis               139  |  106  |  72<H>  ----------------------------<  91  4.1   |  22  |  3.17<H>    Ca    8.2<L>      31 Dec 2022 21:10  Phos  4.7       Mg     2.2         TPro  4.4<L>  /  Alb  2.3<L>  /  TBili  0.6  /  DBili  0.2  /  AST  18  /  ALT  12  /  AlkPhos  49  12-    PT/INR - ( 31 Dec 2022 02:22 )   PT: 15.0 sec;   INR: 1.30 ratio         PTT - ( 31 Dec 2022 02:22 )  PTT:38.5 sec       PTT - ( 30 Dec 2022 01:05 )  PTT:35.5 sec  Transfusion     [ ] PRBC   [ ] Platelets   [ ] FFP   [ ] Cryoprecipitate      INFECTIOUS DISEASES                        7.4    11.53 )-----------( 114      ( 31 Dec 2022 21:10 )             23.4       RECENT CULTURES:  Specimen Source: Combi-Cath Combi-Cath  Date/Time:  @ 16:18  Culture Results:   Normal Respiratory Dariela present  Gram Stain:   Rare polymorphonuclear leukocytes per low power field  No Squamous epithelial cells per low power field  No organisms seen  Organism: --  Specimen Source: .Blood Blood-Peripheral  Date/Time:  @ 07:35  Culture Results:   Growth in anaerobic bottle: Gram Positive Cocci in Pairs and Chains  ***Blood Panel PCR results on this specimen are available  approximately 3 hours after the Gram stain result.***  Gram stain, PCR, and/or culture results may not always  correspond due to difference in methodologies.  ************************************************************  This PCR assay was performed by multiplex PCR. This  Assay tests for 66 bacterial and resistance gene targets.  Please refer to the [facility] test directory  at [email or web address] for details.  Gram Stain:   Growth in anaerobic bottle: Gram Positive Cocci in Pairs and Chains  Organism: Blood Culture PCR  Specimen Source: .Blood Blood-Peripheral  Date/Time:  @ 07:33  Culture Results:   No growth to date.  Gram Stain: --  Organism: --    Meds: meropenem  IVPB 500 milliGRAM(s) IV Intermittent every 12 hours        ENDOCRINE  CAPILLARY BLOOD GLUCOSE      POCT Blood Glucose.: 109 mg/dL (30 Dec 2022 22:56)  POCT Blood Glucose.: 125 mg/dL (30 Dec 2022 18:24)  POCT Blood Glucose.: 118 mg/dL (30 Dec 2022 11:06)  POCT Blood Glucose.: 163 mg/dL (30 Dec 2022 04:54)    Meds: insulin lispro (ADMELOG) corrective regimen sliding scale   SubCutaneous every 6 hours  vasopressin Infusion 0.03 Unit(s)/Min IV Continuous <Continuous>        ACCESS DEVICES:  [x ] Peripheral IV  [x ] Central Venous Line	[ ] R	[ ] L	[ ] IJ	[ ] Fem	[ ] SC	Placed:   [x ] Arterial Line		[ ] R	[ ] L	[ ] Fem	[ ] Rad	[ ] Ax	Placed:   [ ] PICC:					[ ] Mediport  [x ] Urinary Catheter, Date Placed:   [x] Necessity of urinary, arterial, and venous catheters discussed    OTHER MEDICATIONS:  artificial  tears Solution 1 Drop(s) Both EYES four times a day  bacitracin   Ointment 1 Application(s) Topical two times a day  chlorhexidine 0.12% Liquid 15 milliLiter(s) Oral Mucosa every 12 hours  chlorhexidine 2% Cloths 1 Application(s) Topical <User Schedule>  sodium chloride 0.65% Nasal 1 Spray(s) Both Nostrils daily      CODE STATUS:  full code

## 2023-01-01 NOTE — EEG REPORT - NS EEG TEXT BOX
REPORT OF CONTINUOUS VIDEO EEG   [facility]: [address], [place] [address], Phone: [phone] [facility]: [address] [address] [facility], [place] [address], Phone: [phone] [facility]: [address] [place] [address], Phone: [phone]  Patient Name: Lit Castillo   Age: 89 year, : 1924 MRN #: -, Sauceda: 8U Bed 1- Referring Physician: - EEG #: [id number]  Study Date: 2022   Start Time: 1:22:09 PM     End Date: 23     End Time: 07:59:58 AM    Study Duration: 18:12 hrs   Study Information:  EEG Recording Technique: The patient underwent continuous Video-EEG monitoring, using Telemetry System hardware on the XLTek Digital System. EEG and video data were stored on a computer hard drive with important events saved in digital archive files. The material was reviewed by a physician (electroencephalographer / epileptologist) on a daily basis. Alcon and seizure detection algorithms were utilized and reviewed. An EEG Technician attended to the patient, and was available throughout daytime work hours.  The epilepsy center neurologist was available in person or on call 24-hours per day.  EEG Placement and Labeling of Electrodes: The EEG was performed utilizing 20 channel referential EEG connections (coronal over temporal over parasagittal montage) using all standard 10-20 electrode placements with EKG, with additional electrodes placed in the inferior temporal region using the modified 10-10 montage electrode placements for elective admissions, or if deemed necessary. Recording was at a sampling rate of 256 samples per second per channel. Time synchronized digital video recording was done simultaneously with EEG recording. A low light infrared camera was used for low light recording.   History:  VEEG performed bedside COR:Awake, sedated, intubated No HV due to COVID Protocol No PHOTIC due to patient unstable 89 Y/O Male P/W: Waxing/Waning mental status H/O: CAD, HTN, BPH, Anemia, CKD, Pacemaker  MEDICATIONS  (STANDING): artificial  tears Solution 1 Drop(s) Both EYES four times a day bacitracin   Ointment 1 Application(s) Topical two times a day chlorhexidine 0.12% Liquid 15 milliLiter(s) Oral Mucosa every 12 hours chlorhexidine 2% Cloths 1 Application(s) Topical <User Schedule> DOBUTamine Infusion 2.5 MICROgram(s)/kG/Min (4.5 mL/Hr) IV Continuous <Continuous> enoxaparin Injectable 30 milliGRAM(s) SubCutaneous every 24 hours meropenem  IVPB 500 milliGRAM(s) IV Intermittent every 12 hours norepinephrine Infusion 0.19 MICROgram(s)/kG/Min (21.4 mL/Hr) IV Continuous <Continuous> pantoprazole  Injectable 40 milliGRAM(s) IV Push daily sodium bicarbonate  Infusion 0.075 mEq/kG/Hr (30 mL/Hr) IV Continuous <Continuous>  Interpretation:  [[[Abbreviation Key:  PDR=alpha rhythm/posterior dominant rhythm. A-P=anterior posterior.  Amplitude: ‘very low’:<20; ‘low’:20-49; ‘medium’:; ‘high’:>150uV.  Persistence for periodic/rhythmic patterns (% of epoch) ‘rare’:<1%; ‘occasional’:1-10%; ‘frequent’:10-50%; ‘abundant’:50-90%; ‘continuous’:>90%.  Persistence for sporadic discharges: ‘rare’:<1/hr; ‘occasional’:1/min-1/hr; ‘frequent’:>1/min; ‘abundant’:>1/10 sec.  RPP=rhythmic and periodic patterns; GRDA=generalized rhythmic delta activity; FIRDA=frontal intermittent GRDA; LRDA=lateralized rhythmic delta activity; TIRDA=temporal intermittent rhythmic delta activity;  LPD=PLED=lateralized periodic discharges; GPD=generalized periodic discharges; BIPDs =bilateral independent periodic discharges; Mf=multifocal; SIRPDs=stimulus induced rhythmic, periodic, or ictal appearing discharges; BIRDs=brief potentially ictal rhythmic discharges >4 Hz, lasting .5-10s; PFA (paroxysmal bursts >13 Hz or =8 Hz <10s).  Modifiers: +F=with fast component; +S=with spike component; +R=with rhythmic component.  S-B=burst suppression pattern.  Max=maximal. N1-drowsy; N2-stage II sleep; N3-slow wave sleep. SSS/BETS=small sharp spikes/benign epileptiform transients of sleep. HV=hyperventilation; PS=photic stimulation]]]  Daily EEG Visual Analysis  Study Date: 2022   Start Time: 1:22:09 PM     End Date: 23     End Time: 07:59:58 AM    Study Duration: 18:12 hrs   FINDINGS:    Background: Symmetry: symmetric Continuous: continuous PDR: not discernible  Reactivity: present Voltage: normal, mostly 20-150uV Anterior Posterior Gradient: present Breach: absent  Background Slowing: Generalized slowing: diffuse theta/delta activity  Focal slowing: none was present.  State Changes:  -Drowsiness was characterized by fragmentation, attenuation, and slowing of the background activity.    -Stage II sleep transients were not recorded.  Sporadic Epileptiform Discharges:  None  Rhythmic and Periodic Patterns (RPPs): None   Electrographic and Electroclinical seizures: None  Other Clinical Events: None  Activation Procedures:  -Hyperventilation was not performed.   -Photic stimulation was not performed.  Artifacts: Intermittent myogenic and movement artifacts were noted.  ECG: The heart rate on single channel ECG was predominantly between 60-80 BPM.  EEG Summary / Classification:  Abnormal EEG in the awake / drowsy / asleep states. -Background slowing, moderate, generalized   EEG Impression / Clinical Correlate:  Abormal prolonged EEG study. There is moderate diffuse/multifocal cerebral dysfunction.  No epileptic discharges recorded. No seizures recorded.   In absence of additional clinical concerns, recommend consideration for discontinuation of current EEG study with reconnection in future if warranted.   Tiburcio Siegel MD Epilepsy Fellow , [facility] Department of Neurology, [facility]  [facility] EEG Reading Room Ph#: [phone] Epilepsy Answering Service after 5PM and before 8:30AM: Ph#: [phone]    REPORT OF CONTINUOUS VIDEO EEG   [facility]: [address], [place] [address], Phone: [phone] [facility]: [phone], [place] [address], Phone: [phone] [facility]: [address] [place] [address], Phone: [phone]  Patient Name: Lit Castillo   Age: 89 year, : 1924 MRN #: -, Suaceda: 8U Bed 1- Referring Physician: - EEG #: [id number]  Study Date: 2022   Start Time: 1:22:09 PM     End Date: 23     End Time: 07:59:58 AM    Study Duration: 18:12 hrs   Study Information:  EEG Recording Technique: The patient underwent continuous Video-EEG monitoring, using Telemetry System hardware on the XLTek Digital System. EEG and video data were stored on a computer hard drive with important events saved in digital archive files. The material was reviewed by a physician (electroencephalographer / epileptologist) on a daily basis. Alcon and seizure detection algorithms were utilized and reviewed. An EEG Technician attended to the patient, and was available throughout daytime work hours.  The epilepsy center neurologist was available in person or on call 24-hours per day.  EEG Placement and Labeling of Electrodes: The EEG was performed utilizing 20 channel referential EEG connections (coronal over temporal over parasagittal montage) using all standard 10-20 electrode placements with EKG, with additional electrodes placed in the inferior temporal region using the modified 10-10 montage electrode placements for elective admissions, or if deemed necessary. Recording was at a sampling rate of 256 samples per second per channel. Time synchronized digital video recording was done simultaneously with EEG recording. A low light infrared camera was used for low light recording.   History:  VEEG performed bedside COR:Awake, sedated, intubated No HV due to COVID Protocol No PHOTIC due to patient unstable 89 Y/O Male P/W: Waxing/Waning mental status H/O: CAD, HTN, BPH, Anemia, CKD, Pacemaker  MEDICATIONS  (STANDING): artificial  tears Solution 1 Drop(s) Both EYES four times a day bacitracin   Ointment 1 Application(s) Topical two times a day chlorhexidine 0.12% Liquid 15 milliLiter(s) Oral Mucosa every 12 hours chlorhexidine 2% Cloths 1 Application(s) Topical <User Schedule> DOBUTamine Infusion 2.5 MICROgram(s)/kG/Min (4.5 mL/Hr) IV Continuous <Continuous> enoxaparin Injectable 30 milliGRAM(s) SubCutaneous every 24 hours meropenem  IVPB 500 milliGRAM(s) IV Intermittent every 12 hours norepinephrine Infusion 0.19 MICROgram(s)/kG/Min (21.4 mL/Hr) IV Continuous <Continuous> pantoprazole  Injectable 40 milliGRAM(s) IV Push daily sodium bicarbonate  Infusion 0.075 mEq/kG/Hr (30 mL/Hr) IV Continuous <Continuous>  Interpretation:  [[[Abbreviation Key:  PDR=alpha rhythm/posterior dominant rhythm. A-P=anterior posterior.  Amplitude: ‘very low’:<20; ‘low’:20-49; ‘medium’:; ‘high’:>150uV.  Persistence for periodic/rhythmic patterns (% of epoch) ‘rare’:<1%; ‘occasional’:1-10%; ‘frequent’:10-50%; ‘abundant’:50-90%; ‘continuous’:>90%.  Persistence for sporadic discharges: ‘rare’:<1/hr; ‘occasional’:1/min-1/hr; ‘frequent’:>1/min; ‘abundant’:>1/10 sec.  RPP=rhythmic and periodic patterns; GRDA=generalized rhythmic delta activity; FIRDA=frontal intermittent GRDA; LRDA=lateralized rhythmic delta activity; TIRDA=temporal intermittent rhythmic delta activity;  LPD=PLED=lateralized periodic discharges; GPD=generalized periodic discharges; BIPDs =bilateral independent periodic discharges; Mf=multifocal; SIRPDs=stimulus induced rhythmic, periodic, or ictal appearing discharges; BIRDs=brief potentially ictal rhythmic discharges >4 Hz, lasting .5-10s; PFA (paroxysmal bursts >13 Hz or =8 Hz <10s).  Modifiers: +F=with fast component; +S=with spike component; +R=with rhythmic component.  S-B=burst suppression pattern.  Max=maximal. N1-drowsy; N2-stage II sleep; N3-slow wave sleep. SSS/BETS=small sharp spikes/benign epileptiform transients of sleep. HV=hyperventilation; PS=photic stimulation]]]  Daily EEG Visual Analysis  Study Date: 2022   Start Time: 1:22:09 PM     End Date: 23     End Time: 07:59:58 AM    Study Duration: 18:12 hrs   FINDINGS:    Background: Symmetry: symmetric Continuous: continuous PDR: not discernible  Reactivity: present Voltage: normal, mostly 20-150uV Anterior Posterior Gradient: present Breach: absent  Background Slowing: Generalized slowing: diffuse theta/delta activity  Focal slowing: none was present.  State Changes:  -Drowsiness was characterized by fragmentation, attenuation, and slowing of the background activity.    -Stage II sleep transients were not recorded.  Sporadic Epileptiform Discharges:  None  Rhythmic and Periodic Patterns (RPPs): None   Electrographic and Electroclinical seizures: None  Other Clinical Events: None  Activation Procedures:  -Hyperventilation was not performed.   -Photic stimulation was not performed.  Artifacts: Intermittent myogenic and movement artifacts were noted.  ECG: The heart rate on single channel ECG was predominantly between 60-80 BPM.  EEG Summary / Classification:  Abnormal EEG in the awake / drowsy / asleep states. -Background slowing, moderate, generalized   EEG Impression / Clinical Correlate:  Abormal prolonged EEG study. There is moderate diffuse/multifocal cerebral dysfunction.  No epileptic discharges recorded. No seizures recorded.   In absence of additional clinical concerns, recommend consideration for discontinuation of current EEG study with reconnection in future if warranted.   Tiburcio Siegel MD Epilepsy Fellow , [facility] Department of Neurology, [facility]  Cm Varghese MD PhD Director, Epilepsy Division, [facility] EEG Reading Room Ph#: [phone] Epilepsy Answering Service after 5PM and before 8:30AM: Ph#: [phone]    REPORT OF CONTINUOUS VIDEO EEG   [facility]: [address], [place] [address], Phone: [phone] [facility]: [address] [facility], [place] [address], Phone: [phone] [facility]: [address] [place] [address], Phone: [phone]  Patient Name: Lit Castillo   Age: 89 year, : 1924 MRN #: -, Sauceda: 8U Bed 1- Referring Physician: - EEG #: 22-  Study Date: 2022   Start Time: 1:22:09 PM     End Date: 23     End Time: 11: 35 AM    Study Duration: 21:47 hrs   Study Information:  EEG Recording Technique: The patient underwent continuous Video-EEG monitoring, using Telemetry System hardware on the XLTek Digital System. EEG and video data were stored on a computer hard drive with important events saved in digital archive files. The material was reviewed by a physician (electroencephalographer / epileptologist) on a daily basis. Alcon and seizure detection algorithms were utilized and reviewed. An EEG Technician attended to the patient, and was available throughout daytime work hours.  The epilepsy center neurologist was available in person or on call 24-hours per day.  EEG Placement and Labeling of Electrodes: The EEG was performed utilizing 20 channel referential EEG connections (coronal over temporal over parasagittal montage) using all standard 10-20 electrode placements with EKG, with additional electrodes placed in the inferior temporal region using the modified 10-10 montage electrode placements for elective admissions, or if deemed necessary. Recording was at a sampling rate of 256 samples per second per channel. Time synchronized digital video recording was done simultaneously with EEG recording. A low light infrared camera was used for low light recording.   History:  VEEG performed bedside COR:Awake, sedated, intubated No HV due to COVID Protocol No PHOTIC due to patient unstable 89 Y/O Male P/W: Waxing/Waning mental status H/O: CAD, HTN, BPH, Anemia, CKD, Pacemaker  MEDICATIONS  (STANDING): artificial  tears Solution 1 Drop(s) Both EYES four times a day bacitracin   Ointment 1 Application(s) Topical two times a day chlorhexidine 0.12% Liquid 15 milliLiter(s) Oral Mucosa every 12 hours chlorhexidine 2% Cloths 1 Application(s) Topical <User Schedule> DOBUTamine Infusion 2.5 MICROgram(s)/kG/Min (4.5 mL/Hr) IV Continuous <Continuous> enoxaparin Injectable 30 milliGRAM(s) SubCutaneous every 24 hours meropenem  IVPB 500 milliGRAM(s) IV Intermittent every 12 hours norepinephrine Infusion 0.19 MICROgram(s)/kG/Min (21.4 mL/Hr) IV Continuous <Continuous> pantoprazole  Injectable 40 milliGRAM(s) IV Push daily sodium bicarbonate  Infusion 0.075 mEq/kG/Hr (30 mL/Hr) IV Continuous <Continuous>  Interpretation:  [[[Abbreviation Key:  PDR=alpha rhythm/posterior dominant rhythm. A-P=anterior posterior.  Amplitude: ‘very low’:<20; ‘low’:20-49; ‘medium’:; ‘high’:>150uV.  Persistence for periodic/rhythmic patterns (% of epoch) ‘rare’:<1%; ‘occasional’:1-10%; ‘frequent’:10-50%; ‘abundant’:50-90%; ‘continuous’:>90%.  Persistence for sporadic discharges: ‘rare’:<1/hr; ‘occasional’:1/min-1/hr; ‘frequent’:>1/min; ‘abundant’:>1/10 sec.  RPP=rhythmic and periodic patterns; GRDA=generalized rhythmic delta activity; FIRDA=frontal intermittent GRDA; LRDA=lateralized rhythmic delta activity; TIRDA=temporal intermittent rhythmic delta activity;  LPD=PLED=lateralized periodic discharges; GPD=generalized periodic discharges; BIPDs =bilateral independent periodic discharges; Mf=multifocal; SIRPDs=stimulus induced rhythmic, periodic, or ictal appearing discharges; BIRDs=brief potentially ictal rhythmic discharges >4 Hz, lasting .5-10s; PFA (paroxysmal bursts >13 Hz or =8 Hz <10s).  Modifiers: +F=with fast component; +S=with spike component; +R=with rhythmic component.  S-B=burst suppression pattern.  Max=maximal. N1-drowsy; N2-stage II sleep; N3-slow wave sleep. SSS/BETS=small sharp spikes/benign epileptiform transients of sleep. HV=hyperventilation; PS=photic stimulation]]]  Daily EEG Visual Analysis  Study Date: 2022   Start Time: 1:22:09 PM     End Date: 23     End Time: 07:59:58 AM    Study Duration: 18:12 hrs   FINDINGS:    Background: Symmetry: symmetric Continuous: continuous PDR: not discernible  Reactivity: present Voltage: normal, mostly 20-150uV Anterior Posterior Gradient: present Breach: absent  Background Slowing: Generalized slowing: diffuse theta/delta activity  Focal slowing: none was present.  State Changes:  -Drowsiness was characterized by fragmentation, attenuation, and slowing of the background activity.    -Stage II sleep transients were not recorded.  Sporadic Epileptiform Discharges:  None  Rhythmic and Periodic Patterns (RPPs): None   Electrographic and Electroclinical seizures: None  Other Clinical Events: None  Activation Procedures:  -Hyperventilation was not performed.   -Photic stimulation was not performed.  Artifacts: Intermittent myogenic and movement artifacts were noted.  ECG: The heart rate on single channel ECG was predominantly between 60-80 BPM.  EEG Summary / Classification:  Abnormal EEG in the awake / drowsy / asleep states. -Background slowing, moderate, generalized   EEG Impression / Clinical Correlate:  Abormal prolonged EEG study. There is moderate diffuse/multifocal cerebral dysfunction.  No epileptic discharges recorded. No seizures recorded.   In absence of additional clinical concerns, recommend consideration for discontinuation of current EEG study with reconnection in future if warranted.   Tiburcio Siegel MD Epilepsy Fellow , [facility] Department of Neurology, [facility] [employer] [facility]  Cm Varghese MD PhD Director, Epilepsy Division, [facility] EEG Reading Room Ph#: [phone] Epilepsy Answering Service after 5PM and before 8:30AM: Ph#: [phone]

## 2023-01-02 NOTE — PROGRESS NOTE ADULT - SUBJECTIVE AND OBJECTIVE BOX
Surgery Progress Note     Subjective/24hour Events:   Patient seen and examined.   Some increased nonfocal movement.    Surgical Intensive Care Unit interval events noted.     Vital Signs:  Vital Signs Last 24 Hrs  T(C): 37.6 (02 Jan 2023 07:00), Max: 37.6 (02 Jan 2023 07:00)  T(F): 99.7 (02 Jan 2023 07:00), Max: 99.7 (02 Jan 2023 07:00)  HR: 60 (02 Jan 2023 10:00) (60 - 72)  BP: 119/55 (02 Jan 2023 09:00) (96/51 - 136/78)  BP(mean): 79 (02 Jan 2023 09:00) (71 - 101)  RR: 18 (02 Jan 2023 10:00) (17 - 25)  SpO2: 100% (02 Jan 2023 10:00) (95% - 100%)    Parameters below as of 02 Jan 2023 07:00  Patient On (Oxygen Delivery Method): ventilator    O2 Concentration (%): 30    CAPILLARY BLOOD GLUCOSE      POCT Blood Glucose.: 142 mg/dL (02 Jan 2023 05:10)      I&O's Detail    01 Jan 2023 07:01  -  02 Jan 2023 07:00  --------------------------------------------------------  IN:    DOBUTamine: 18 mL    Enteral Tube Flush: 315 mL    IV PiggyBack: 100 mL    Jevity 1.5: 1060 mL    Norepinephrine: 8.5 mL    PRBCs (Packed Red Blood Cells): 300 mL    Sodium Bicarbonate: 120 mL  Total IN: 1921.5 mL    OUT:    Indwelling Catheter - Urethral (mL): 1420 mL  Total OUT: 1420 mL    Total NET: 501.5 mL      02 Jan 2023 07:01  -  02 Jan 2023 10:54  --------------------------------------------------------  IN:    Enteral Tube Flush: 40 mL    Jevity 1.5: 135 mL  Total IN: 175 mL    OUT:    Indwelling Catheter - Urethral (mL): 195 mL  Total OUT: 195 mL    Total NET: -20 mL          MEDICATIONS  (STANDING):  allopurinol 100 milliGRAM(s) Oral daily  artificial  tears Solution 1 Drop(s) Both EYES four times a day  ascorbic acid 500 milliGRAM(s) Oral every 24 hours  atorvastatin 20 milliGRAM(s) Oral at bedtime  bacitracin   Ointment 1 Application(s) Topical two times a day  chlorhexidine 0.12% Liquid 15 milliLiter(s) Oral Mucosa every 12 hours  chlorhexidine 2% Cloths 1 Application(s) Topical <User Schedule>  cyanocobalamin 1000 MICROGram(s) Oral every 24 hours  enoxaparin Injectable 30 milliGRAM(s) SubCutaneous every 24 hours  epoetin sherrie-epbx (RETACRIT) Injectable 61190 Unit(s) SubCutaneous <User Schedule>  folic acid Injectable 1 milliGRAM(s) IV Push every 24 hours  insulin lispro (ADMELOG) corrective regimen sliding scale   SubCutaneous every 6 hours  iron sucrose Injectable 200 milliGRAM(s) IV Push every 24 hours  meropenem  IVPB 500 milliGRAM(s) IV Intermittent every 12 hours  metoprolol tartrate 12.5 milliGRAM(s) Enteral Tube every 12 hours  pantoprazole  Injectable 40 milliGRAM(s) IV Push daily  sodium bicarbonate 1300 milliGRAM(s) Oral every 8 hours  sodium chloride 0.65% Nasal 1 Spray(s) Both Nostrils daily    MEDICATIONS  (PRN):  acetaminophen     Tablet .. 650 milliGRAM(s) Oral every 6 hours PRN Mild Pain (1 - 3)  HYDROmorphone  Injectable 0.25 milliGRAM(s) IV Push every 3 hours PRN Severe Breakthrough Pain      Physical Exam:  Constitutional: moves upper extremities   HEENT: face with multiple abrasions and dried blood diffusely, ecchymosis from nasal fracture  Respiratory: intubated and ventilator dependent  Abdomen: Soft, nondistended.  Extremities: LUE bruised from fingers to bicep area. RUE w/ bruising from fingers to forearm      Labs:    01-02    140  |  107  |  73<H>  ----------------------------<  185<H>  4.1   |  24  |  3.00<H>    Ca    7.9<L>      02 Jan 2023 00:20  Phos  3.6     01-02  Mg     2.2     01-02    TPro  4.4<L>  /  Alb  2.3<L>  /  TBili  0.6  /  DBili  0.2  /  AST  18  /  ALT  12  /  AlkPhos  49  12-31    LIVER FUNCTIONS - ( 31 Dec 2022 13:15 )  Alb: 2.3 g/dL / Pro: 4.4 g/dL / ALK PHOS: 49 U/L / ALT: 12 U/L / AST: 18 U/L / GGT: x                                 8.7    11.12 )-----------( 132      ( 02 Jan 2023 04:38 )             27.0     PT/INR - ( 02 Jan 2023 00:20 )   PT: 14.0 sec;   INR: 1.20 ratio         PTT - ( 02 Jan 2023 00:20 )  PTT:47.7 sec

## 2023-01-02 NOTE — PROGRESS NOTE ADULT - CRITICAL CARE ATTENDING COMMENT
given negative CT c spine imaging, and gross motor exam intact, and now patient awake will remove c collar.

## 2023-01-02 NOTE — PROGRESS NOTE ADULT - ATTENDING COMMENTS
fall , hypothermic and shock, now resolved, but poor mental status    is deaf and uses sign language  arousable in AM, opening eyes, moving all 4 with stimulation, not following commands  repeat HCT unchanged  EEG ongoing, no sz activity. may dc  IV tylenol, PRN dilaudid  trach day 4, SBT failed   5/400/16/40% PIP 10  good gas exchange  AM CXR clear  stable hemodynamics  s/p dobutamine, vasopressors  lact 0.8  NPO, TEN @ 45 OG in place   protonix dc given on enteric feeds  dc PO bicarb  s/p diuresis  baez in place, net slight pos  EPO per heme recommendations  COVID pos  afebrile  gpc bcx initally, thought to be contaminant  merop, check procal   glycemic control  PT OT when able to  son is at bedside and updated  full code

## 2023-01-02 NOTE — PROGRESS NOTE ADULT - SUBJECTIVE AND OBJECTIVE BOX
24 HOUR EVENTS:  - inc TF @ 45  - dc IV bicarb drip, started PO bicarb 1300 TID  - Dobutamine Dc'd   - Lasix 40x2  - 1 U PRBC administered o/n       SUBJECTIVE/ROS:  [ ] A ten-point review of systems was otherwise negative except as noted.  [ ] Due to altered mental status/intubation, subjective information were not able to be obtained from the patient. History was obtained, to the extent possible, from review of the chart and collateral sources of information.      NEURO  RASS:  -4  GCS:   8  CAM ICU: Unable to assess   Exam: sedated  Meds: HYDROmorphone  Injectable 0.25 milliGRAM(s) IV Push every 3 hours PRN Severe Pain (7 - 10)    [x] Adequacy of sedation and pain control has been assessed and adjusted      RESPIRATORY  RR: 18 (01-02-23 @ 01:00) (15 - 27)  SpO2: 96% (01-02-23 @ 01:00) (96% - 100%)  Wt(kg): --  Exam: mechanical ventilation  Mechanical Ventilation: Mode: AC/ CMV (Assist Control/ Continuous Mandatory Ventilation), RR (machine): 16, RR (patient): 20, TV (machine): 400, FiO2: 30, PEEP: 5, ITime: 0.9, MAP: 7.7, PIP: 14  ABG - ( 01 Jan 2023 23:57 )  pH: 7.44  /  pCO2: 37    /  pO2: 132   / HCO3: 25    / Base Excess: 0.9   /  SaO2: 98.8    Lactate: x                [N/A] Extubation Readiness Assessed  Meds:       CARDIOVASCULAR  HR: 60 (01-02-23 @ 01:00) (60 - 92)  BP: 99/50 (01-02-23 @ 01:00) (96/51 - 131/63)  BP(mean): 71 (01-02-23 @ 01:00) (69 - 90)  ABP: 109/36 (01-02-23 @ 01:00) (109/36 - 135/41)  ABP(mean): 59 (01-02-23 @ 01:00) (58 - 69)  Wt(kg): --  CVP(cm H2O): --  VBG - ( 31 Dec 2022 20:57 )  pH: 7.34  /  pCO2: 44    /  pO2: 47    / HCO3: 24    / Base Excess: -2.2  /  SaO2: 80.0   Lactate: x                  Exam: paced  Cardiac Rhythm: regular, paced   Perfusion     [x]Adequate   [ ]Inadequate  Mentation   [x]Normal       [ ]Reduced  Extremities  [x]Warm         [ ]Cool  Volume Status [ ]Hypervolemic [x]Euvolemic [ ]Hypovolemic  Meds: metoprolol tartrate 12.5 milliGRAM(s) Oral every 12 hours        GI/NUTRITION  Exam: soft, nontender, nondistended   Diet: NPO w TF   Meds: pantoprazole  Injectable 40 milliGRAM(s) IV Push daily      GENITOURINARY  I&O's Detail    12-31 @ 07:01 - 01-01 @ 07:00  --------------------------------------------------------  IN:    DOBUTamine: 54 mL    DOBUTamine: 4.5 mL    DOBUTamine: 76.5 mL    Enteral Tube Flush: 30 mL    IV PiggyBack: 50 mL    IV PiggyBack: 150 mL    Jevity 1.5: 240 mL    Norepinephrine: 98.1 mL    Sodium Bicarbonate: 690 mL    Sodium Bicarbonate: 50 mL  Total IN: 1443.1 mL    OUT:    Indwelling Catheter - Urethral (mL): 780 mL  Total OUT: 780 mL    Total NET: 663.1 mL      01-01 @ 07:01 - 01-02 @ 01:15  --------------------------------------------------------  IN:    DOBUTamine: 18 mL    Enteral Tube Flush: 265 mL    IV PiggyBack: 50 mL    Jevity 1.5: 790 mL    Norepinephrine: 8.5 mL    Sodium Bicarbonate: 120 mL  Total IN: 1251.5 mL    OUT:    Indwelling Catheter - Urethral (mL): 1065 mL  Total OUT: 1065 mL    Total NET: 186.5 mL          01-02    140  |  107  |  73<H>  ----------------------------<  185<H>  4.1   |  24  |  3.00<H>    Ca    7.9<L>      02 Jan 2023 00:20  Phos  3.6     01-02  Mg     2.2     01-02    TPro  4.4<L>  /  Alb  2.3<L>  /  TBili  0.6  /  DBili  0.2  /  AST  18  /  ALT  12  /  AlkPhos  49  12-31    [ ] Davis catheter, indication: N/A  Meds: ascorbic acid 500 milliGRAM(s) Oral every 24 hours  cyanocobalamin 1000 MICROGram(s) Oral every 24 hours  folic acid Injectable 1 milliGRAM(s) IV Push every 24 hours  iron sucrose Injectable 200 milliGRAM(s) IV Push every 24 hours  sodium bicarbonate 1300 milliGRAM(s) Oral every 8 hours        HEMATOLOGIC  Meds: enoxaparin Injectable 30 milliGRAM(s) SubCutaneous every 24 hours    [x] VTE Prophylaxis                        7.1    10.32 )-----------( 120      ( 02 Jan 2023 00:20 )             21.9     PT/INR - ( 02 Jan 2023 00:20 )   PT: 14.0 sec;   INR: 1.20 ratio         PTT - ( 02 Jan 2023 00:20 )  PTT:47.7 sec  Transfusion     [ ] PRBC   [ ] Platelets   [ ] FFP   [ ] Cryoprecipitate      INFECTIOUS DISEASES  WBC Count: 10.32 K/uL (01-02 @ 00:20)  WBC Count: 10.42 K/uL (01-01 @ 12:09)  WBC Count: 10.88 K/uL (01-01 @ 02:52)    RECENT CULTURES:  Specimen Source: .Blood Blood-Peripheral  Date/Time: 12-31 @ 00:53  Culture Results:   No growth to date.  Gram Stain: --  Organism: --  Specimen Source: .Blood Blood-Peripheral  Date/Time: 12-31 @ 00:50  Culture Results:   No growth to date.  Gram Stain: --  Organism: --  Specimen Source: Combi-Cath Combi-Cath  Date/Time: 12-29 @ 16:18  Culture Results:   Normal Respiratory Dariela present  Gram Stain:   Rare polymorphonuclear leukocytes per low power field  No Squamous epithelial cells per low power field  No organisms seen  Organism: --  Specimen Source: .Blood Blood-Peripheral  Date/Time: 12-29 @ 07:35  Culture Results:   Growth in anaerobic bottle: Streptococcus mitis/oralis group  Alpha hemolytic strep  (not Strep. pneumoniae or Enterococcus)  Single set isolate, possible contaminant. Contact  Microbiology if susceptibility testing clinically  indicated.  Gram Stain:   Growth in anaerobic bottle: Gram Positive Cocci in Pairs and Chains  Organism: Blood Culture PCR  Specimen Source: .Blood Blood-Peripheral  Date/Time: 12-29 @ 07:33  Culture Results:   No growth to date.  Gram Stain: --  Organism: --    Meds: meropenem  IVPB 500 milliGRAM(s) IV Intermittent every 12 hours        ENDOCRINE  CAPILLARY BLOOD GLUCOSE      POCT Blood Glucose.: 94 mg/dL (01 Jan 2023 04:40)  POCT Blood Glucose.: 89 mg/dL (01 Jan 2023 02:52)    Meds: insulin lispro (ADMELOG) corrective regimen sliding scale   SubCutaneous every 6 hours        ACCESS DEVICES:  [ ] Peripheral IV  [ ] Central Venous Line	[ ] R	[ ] L	[ ] IJ	[ ] Fem	[ ] SC	Placed:   [ ] Arterial Line		[ ] R	[ ] L	[ ] Fem	[ ] Rad	[ ] Ax	Placed:   [ ] PICC:					[ ] Mediport  [ ] Urinary Catheter, Date Placed:   [x] Necessity of urinary, arterial, and venous catheters discussed    OTHER MEDICATIONS:  artificial  tears Solution 1 Drop(s) Both EYES four times a day  bacitracin   Ointment 1 Application(s) Topical two times a day  chlorhexidine 0.12% Liquid 15 milliLiter(s) Oral Mucosa every 12 hours  chlorhexidine 2% Cloths 1 Application(s) Topical <User Schedule>  sodium chloride 0.65% Nasal 1 Spray(s) Both Nostrils daily      CODE STATUS:      IMAGING:

## 2023-01-02 NOTE — AIRWAY REMOVAL NOTE  ADULT & PEDS - ARTIFICAL AIRWAY REMOVAL COMMENTS
Written order for extubation verified. The patient was identified by full name and birth date compared to the identification band. Present during the procedure was Oziel Jara RN

## 2023-01-02 NOTE — PROGRESS NOTE ADULT - ASSESSMENT
89 year old male history of CAD, s/p CABG, PPM, HTN, CKD 4, HLD, gout, upper GI bleed 2/2022, recent COVID 12/25/22, presenting as a level 1 trauma activation after being found down unresponsive in his backyard by his son. Patient arrived hypothermic to 78F, hypotensive to 60/40, responded to 110's/70's with warm IVF resuscitation, HR 70's, O2 100%, GCS 10 (4,2,4). During initial resuscitation became progressively more obtunded and was intubated for airway protection. eFAST negative  CXR and PXR in trauma bay unremarkable    PLAN:  - GOC discussion  - Appreciate excellent care per Surgical Intensive Care Unit.     Surgery [phone]

## 2023-01-02 NOTE — CONSULT NOTE ADULT - SUBJECTIVE AND OBJECTIVE BOX
Chief Complaint:  Patient is a 89y old  Male who presents with a chief complaint of s/p fall (01 Jan 2023 00:41)      HPI: Patient known. He has a chronic anemia. 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 He has a monoclonal protein and h may also have MM. Due to his advanced age and families wishes, he has been managed conservatively with Procrit and he has responded well to that. Informed by his daughter in law yesterday that he was here in the hospital.  Apparently, he had wandered out during the night and he was found on the ground and hypothermic. He is here in the hospital and in the ICU. He has been transfused a few times including yesterday. He also has thrombocytopenia      Medications:  acetaminophen     Tablet .. 650 milliGRAM(s) Oral every 6 hours PRN  allopurinol 100 milliGRAM(s) Oral daily  artificial  tears Solution 1 Drop(s) Both EYES four times a day  ascorbic acid 500 milliGRAM(s) Oral every 24 hours  atorvastatin 20 milliGRAM(s) Oral at bedtime  bacitracin   Ointment 1 Application(s) Topical two times a day  chlorhexidine 0.12% Liquid 15 milliLiter(s) Oral Mucosa every 12 hours  chlorhexidine 2% Cloths 1 Application(s) Topical <User Schedule>  cyanocobalamin 1000 MICROGram(s) Oral every 24 hours  enoxaparin Injectable 30 milliGRAM(s) SubCutaneous every 24 hours  folic acid Injectable 1 milliGRAM(s) IV Push every 24 hours  HYDROmorphone  Injectable 0.25 milliGRAM(s) IV Push every 3 hours PRN  insulin lispro (ADMELOG) corrective regimen sliding scale   SubCutaneous every 6 hours  iron sucrose Injectable 200 milliGRAM(s) IV Push every 24 hours  meropenem  IVPB 500 milliGRAM(s) IV Intermittent every 12 hours  metoprolol tartrate 12.5 milliGRAM(s) Enteral Tube every 12 hours  pantoprazole  Injectable 40 milliGRAM(s) IV Push daily  sodium bicarbonate 1300 milliGRAM(s) Oral every 8 hours  sodium chloride 0.65% Nasal 1 Spray(s) Both Nostrils daily        Allergies:  No Known Allergies    FAMILY HISTORY:  No pertinent family history in first degree relatives        Social history: Lives with children. No tobacco, ETOH, or IVDA    PAST MEDICAL & SURGICAL HISTORY:  S/P CABG x 3      CKD (chronic kidney disease)      Anemia      Gout      BPH (benign prostatic hyperplasia)      Hypertension      Hypercholesterolemia      CAD (coronary artery disease)      Pacemaker      History of esophagogastroduodenoscopy (EGD)      Artificial cardiac pacemaker      S/P CABG x 1      REVIEW OF SYSTEMS  unable as pt on vent    Vitals:  Vital Signs Last 24 Hrs  T(C): 37.6 (02 Jan 2023 07:00), Max: 37.6 (02 Jan 2023 07:00)  T(F): 99.7 (02 Jan 2023 07:00), Max: 99.7 (02 Jan 2023 07:00)  HR: 60 (02 Jan 2023 08:43) (60 - 92)  BP: 132/60 (02 Jan 2023 08:00) (96/51 - 136/78)  BP(mean): 87 (02 Jan 2023 08:00) (71 - 101)  RR: 19 (02 Jan 2023 08:00) (15 - 25)  SpO2: 95% (02 Jan 2023 08:43) (95% - 100%)    Parameters below as of 02 Jan 2023 07:00  Patient On (Oxygen Delivery Method): ventilator    O2 Concentration (%): 30    Pex:  intubated on vent  Face bruised  Cv s1 S2 RRR  Lungs clear B/L anteriorly  abd soft +BS  No LE edema    Labs:                        8.7    11.12 )-----------( 132      ( 02 Jan 2023 04:38 )             27.0     CBC Full  -  ( 02 Jan 2023 04:38 )  WBC Count : 11.12 K/uL  RBC Count : 2.81 M/uL  Hemoglobin : 8.7 g/dL  Hematocrit : 27.0 %  Platelet Count - Automated : 132 K/uL  Mean Cell Volume : 96.1 fl  Mean Cell Hemoglobin : 31.0 pg  Mean Cell Hemoglobin Concentration : 32.2 gm/dL  Auto Neutrophil # : x  Auto Lymphocyte # : x  Auto Monocyte # : x  Auto Eosinophil # : x  Auto Basophil # : x  Auto Neutrophil % : x  Auto Lymphocyte % : x  Auto Monocyte % : x  Auto Eosinophil % : x  Auto Basophil % : x    01-02    140  |  107  |  73<H>  ----------------------------<  185<H>  4.1   |  24  |  3.00<H>    Ca    7.9<L>      02 Jan 2023 00:20  Phos  3.6     01-02  Mg     2.2     01-02    TPro  4.4<L>  /  Alb  2.3<L>  /  TBili  0.6  /  DBili  0.2  /  AST  18  /  ALT  12  /  AlkPhos  49  12-31    PT/INR - ( 02 Jan 2023 00:20 )   PT: 14.0 sec;   INR: 1.20 ratio         PTT - ( 02 Jan 2023 00:20 )  PTT:47.7 sec    Ferritin 525  [id number]

## 2023-01-02 NOTE — PROGRESS NOTE ADULT - ASSESSMENT
Patient is a 89 y/o male w/ a PMHx of congenital deafness (communicates w/ ASL), CAD s/p triple vessel CABG, s/p biventricular PPM, HTN, HLD, CKD stage IV, BPH, gout, and anemia presenting after being found down outside hypothermic, hypotensive, and altered requiring intubation AMS w/ imaging revealing bilateral nasal bone and nasal septum fractures. Patient admits to SICU for shock, ?septic, hypothermic.     Neuro: altered mental status, intubated  -MS: opens eyes to touch, moves extr  - CT head negative for any acute pathology   - Multimodal pain control w/ standing IV acetaminophen and low dose PRN Dilaudid IVP  - Repeat CTH in AM     Resp: intubated for altered mental status, bilateral pleural effusions  - PRVC, minimal settings   - SBT passed, but unable to extubate d/t poor MS  - Will SBT w/ goal to extubate when patient is awake & able to follow commands      CV: Shock, distributive vs cardiogenic  - Initially requiring vasopressor and inotropic support with Levo/Vaso and Dobutamine  -Off Vasopressor and inotropic support 1/1   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   -Lactate is cleared    GI: no acute issues  - tube feeds via OG   - Protonix for stress ulcer prophylaxis    Renal: LUCY on CKD stage IV (baseline Cr 2-3)  - s/p 1L fluid and 2 units of blood   - bicarb gtt 150mEq @30cc/hr started for acidosis    Heme:   - Monitor CBC and coags  - VTE ppx: Lovenox   - SCDs    ID: COVID-19 infection  - Will not give remdesivir for now given patient's LUCY  - currenlty on meropenem empirically   - cultures sent, UA neg, one bottle G+cocci in chains and pair, received 1d ose of vanco  - repeat blood cultures: NGTD     Endo: hyperglycemia  - Monitor glucose w/ ISS q6hrs for glycemic control  - HgbA1C 5.6%    MSK: bilateral nasal bone & nasal septum fracture, facial lacerations  - Outpatient follow-up w/ plastics  - Sinus precautions  - Facial lacerations repaired w/ Nylon sutures on 12/29    GOC:  -full code

## 2023-01-02 NOTE — EEG REPORT - NS EEG TEXT BOX
REPORT OF CONTINUOUS VIDEO EEG   [facility]: [address], [place] [address], Phone: [phone] [facility]: [id number], [place] [address], Phone: [phone] [facility]: [address] [place] [address], Phone: [phone]  Patient Name: Lit Castillo   Age: 89 year, : 1924 MRN #: -, [place]: 8U Bed 1- Referring Physician: - EEG #: [id number]  Study Date: 2023   Start Time: 08:00     End Date: 2023     End Time: 13:41    Study Duration: 5:41 hrs   Study Information:  EEG Recording Technique: The patient underwent continuous Video-EEG monitoring, using Telemetry System hardware on the XLTek Digital System. EEG and video data were stored on a computer hard drive with important events saved in digital archive files. The material was reviewed by a physician (electroencephalographer / epileptologist) on a daily basis. Alcon and seizure detection algorithms were utilized and reviewed. An EEG Technician attended to the patient, and was available throughout daytime work hours.  The epilepsy center neurologist was available in person or on call 24-hours per day.  EEG Placement and Labeling of Electrodes: The EEG was performed utilizing 20 channel referential EEG connections (coronal over temporal over parasagittal montage) using all standard 10-20 electrode placements with EKG, with additional electrodes placed in the inferior temporal region using the modified 10-10 montage electrode placements for elective admissions, or if deemed necessary. Recording was at a sampling rate of 256 samples per second per channel. Time synchronized digital video recording was done simultaneously with EEG recording. A low light infrared camera was used for low light recording.   History: VEEG performed bedside COR:Awake, sedated, intubated No HV due to COVID Protocol No PHOTIC due to patient unstable 89 Y/O Male P/W: Waxing/Waning mental status H/O: CAD, HTN, BPH, Anemia, CKD, Pacemaker  Interpretation:  [[[Abbreviation Key:  PDR=alpha rhythm/posterior dominant rhythm. A-P=anterior posterior.  Amplitude: ‘very low’:<20; ‘low’:20-49; ‘medium’:; ‘high’:>150uV.  Persistence for periodic/rhythmic patterns (% of epoch) ‘rare’:<1%; ‘occasional’:1-10%; ‘frequent’:10-50%; ‘abundant’:50-90%; ‘continuous’:>90%.  Persistence for sporadic discharges: ‘rare’:<1/hr; ‘occasional’:1/min-1/hr; ‘frequent’:>1/min; ‘abundant’:>1/10 sec.  RPP=rhythmic and periodic patterns; GRDA=generalized rhythmic delta activity; FIRDA=frontal intermittent GRDA; LRDA=lateralized rhythmic delta activity; TIRDA=temporal intermittent rhythmic delta activity;  LPD=PLED=lateralized periodic discharges; GPD=generalized periodic discharges; BIPDs =bilateral independent periodic discharges; Mf=multifocal; SIRPDs=stimulus induced rhythmic, periodic, or ictal appearing discharges; BIRDs=brief potentially ictal rhythmic discharges >4 Hz, lasting .5-10s; PFA (paroxysmal bursts >13 Hz or =8 Hz <10s).  Modifiers: +F=with fast component; +S=with spike component; +R=with rhythmic component.  S-B=burst suppression pattern.  Max=maximal. N1-drowsy; N2-stage II sleep; N3-slow wave sleep. SSS/BETS=small sharp spikes/benign epileptiform transients of sleep. HV=hyperventilation; PS=photic stimulation]]]  Daily EEG Visual Analysis  FINDINGS:    Background: Symmetry: symmetric Continuous: continuous PDR: not discernible  Reactivity: present Voltage: normal, mostly 20-150uV Anterior Posterior Gradient: present Breach: absent  Background Slowing: Generalized slowing: diffuse theta/delta activity  Focal slowing: none was present.  State Changes:  -Drowsiness was characterized by fragmentation, attenuation, and slowing of the background activity.   -Stage II sleep transients were not recorded.  Sporadic Epileptiform Discharges:  None  Rhythmic and Periodic Patterns (RPPs): None   Electrographic and Electroclinical seizures: None  Other Clinical Events: None  Activation Procedures:  -Hyperventilation was not performed.   -Photic stimulation was not performed.  Artifacts: Intermittent myogenic and movement artifacts were noted.  ECG: The heart rate on single channel ECG was predominantly between 60-80 BPM.  EEG Summary / Classification:  Abnormal EEG in the awake / drowsy / asleep states. -Background slowing, mild-moderate, generalized   EEG Impression / Clinical Correlate:  Abormal prolonged EEG study. There is mild-moderate diffuse/multifocal cerebral dysfunction.  No epileptic discharges recorded. No seizures recorded.  Tiburcio Siegel MD Epilepsy Fellow , [facility] Department of Neurology, [facility] [employer]  Cm Varghese MD PhD Director, Epilepsy Division, [facility] EEG Reading Room Ph#: [phone] Epilepsy Answering Service after 5PM and before 8:30AM: Ph#: [phone]

## 2023-01-03 NOTE — PROGRESS NOTE ADULT - SUBJECTIVE AND OBJECTIVE BOX
EMERITA SANTOS  MRN-[id number]    Patient is a 89y old  Male who presents with a chief complaint of s/p fall (01 Jan 2023 00:41)      Review of System    Resting comfortably on o2 nc    Current Meds  MEDICATIONS  (STANDING):  artificial  tears Solution 1 Drop(s) Both EYES four times a day  bacitracin   Ointment 1 Application(s) Topical two times a day  chlorhexidine 2% Cloths 1 Application(s) Topical <User Schedule>  enoxaparin Injectable 30 milliGRAM(s) SubCutaneous every 24 hours  epoetin sherrie-epbx (RETACRIT) Injectable 78593 Unit(s) SubCutaneous <User Schedule>  folic acid Injectable 1 milliGRAM(s) IV Push every 24 hours  furosemide   Injectable 20 milliGRAM(s) IV Push once  insulin lispro (ADMELOG) corrective regimen sliding scale   SubCutaneous every 6 hours  iron sucrose Injectable 200 milliGRAM(s) IV Push every 24 hours  meropenem  IVPB 500 milliGRAM(s) IV Intermittent every 12 hours  sodium chloride 0.65% Nasal 1 Spray(s) Both Nostrils daily    MEDICATIONS  (PRN):  acetaminophen   IVPB .. 750 milliGRAM(s) IV Intermittent every 6 hours PRN Mild Pain (1 - 3)  HYDROmorphone  Injectable 0.25 milliGRAM(s) IV Push every 3 hours PRN Severe Pain (7 - 10)      Vital Signs Last 24 Hrs  T(C): 36.7 (03 Jan 2023 03:00), Max: 37 (02 Jan 2023 23:00)  T(F): 98.1 (03 Jan 2023 03:00), Max: 98.6 (02 Jan 2023 23:00)  HR: 60 (03 Jan 2023 07:00) (59 - 80)  BP: 107/53 (03 Jan 2023 07:00) (88/47 - 145/60)  BP(mean): 77 (03 Jan 2023 07:00) (64 - 96)  RR: 20 (03 Jan 2023 07:00) (18 - 26)  SpO2: 98% (03 Jan 2023 07:00) (92% - 100%)    Parameters below as of 03 Jan 2023 00:34  Patient On (Oxygen Delivery Method): nasal cannula  O2 Flow (L/min): 2      Physical Exam       NAD    Lab  CBC Full  -  ( 03 Jan 2023 03:50 )  WBC Count : 8.29 K/uL  RBC Count : 2.68 M/uL  Hemoglobin : 8.3 g/dL  Hematocrit : 25.8 %  Platelet Count - Automated : 143 K/uL  Mean Cell Volume : 96.3 fl  Mean Cell Hemoglobin : 31.0 pg  Mean Cell Hemoglobin Concentration : 32.2 gm/dL  Auto Neutrophil # : x  Auto Lymphocyte # : x  Auto Monocyte # : x  Auto Eosinophil # : x  Auto Basophil # : x  Auto Neutrophil % : x  Auto Lymphocyte % : x  Auto Monocyte % : x  Auto Eosinophil % : x  Auto Basophil % : x    01-03    145  |  110<H>  |  70<H>  ----------------------------<  92  3.9   |  26  |  2.69<H>    Ca    8.1<L>      03 Jan 2023 03:50  Phos  3.0     01-03  Mg     2.2     01-03      PT/INR - ( 03 Jan 2023 03:50 )   PT: 14.8 sec;   INR: 1.27 ratio         PTT - ( 03 Jan 2023 03:50 )  PTT:43.9 sec    Rad:    Assessment/Plan

## 2023-01-03 NOTE — PROGRESS NOTE ADULT - SUBJECTIVE AND OBJECTIVE BOX
Trauma Surgery Progress Note  Patient is a 89y old  Male who presents with a chief complaint of s/p fall (01 Jan 2023 00:41)      INTERVAL EVENTS:   - CTH head negative, CT C-spine negative and C- collar cleared  - Extubated, family discussed GOC and plan is for reintubation if needed  - Remains NPO, coughing, will hold off on dysphagia screen until AM  - Started on Retacrit by Heme    SUBJECTIVE: Patient seen and examined at bedside with surgical team. Minimally arousable on exam.    OBJECTIVE:    Vital Signs Last 24 Hrs  T(C): 36.7 (03 Jan 2023 03:00), Max: 37 (02 Jan 2023 23:00)  T(F): 98.1 (03 Jan 2023 03:00), Max: 98.6 (02 Jan 2023 23:00)  HR: 60 (03 Jan 2023 07:00) (59 - 80)  BP: 107/53 (03 Jan 2023 07:00) (88/47 - 145/60)  BP(mean): 77 (03 Jan 2023 07:00) (64 - 96)  RR: 20 (03 Jan 2023 07:00) (18 - 26)  SpO2: 98% (03 Jan 2023 07:00) (92% - 100%)    Parameters below as of 03 Jan 2023 00:34  Patient On (Oxygen Delivery Method): nasal cannula  O2 Flow (L/min): 2  I&O's Detail    02 Jan 2023 07:01  -  03 Jan 2023 07:00  --------------------------------------------------------  IN:    Enteral Tube Flush: 70 mL    IV PiggyBack: 200 mL    Jevity 1.5: 315 mL  Total IN: 585 mL    OUT:    Indwelling Catheter - Urethral (mL): 1005 mL  Total OUT: 1005 mL    Total NET: -420 mL      MEDICATIONS  (STANDING):  artificial  tears Solution 1 Drop(s) Both EYES four times a day  bacitracin   Ointment 1 Application(s) Topical two times a day  chlorhexidine 2% Cloths 1 Application(s) Topical <User Schedule>  enoxaparin Injectable 30 milliGRAM(s) SubCutaneous every 24 hours  epoetin sherrie-epbx (RETACRIT) Injectable 21934 Unit(s) SubCutaneous <User Schedule>  folic acid Injectable 1 milliGRAM(s) IV Push every 24 hours  insulin lispro (ADMELOG) corrective regimen sliding scale   SubCutaneous every 6 hours  iron sucrose Injectable 200 milliGRAM(s) IV Push every 24 hours  meropenem  IVPB 500 milliGRAM(s) IV Intermittent every 12 hours  sodium chloride 0.65% Nasal 1 Spray(s) Both Nostrils daily    MEDICATIONS  (PRN):  acetaminophen   IVPB .. 750 milliGRAM(s) IV Intermittent every 6 hours PRN Mild Pain (1 - 3)  HYDROmorphone  Injectable 0.25 milliGRAM(s) IV Push every 3 hours PRN Severe Pain (7 - 10)      PHYSICAL EXAM:  Constitutional: NAD  HEENT: face with multiple abrasions and dried blood diffusely, ecchymosis from nasal fracture, closed laceration above left eyebrow  Respiratory: breathing comfortably on RA  Abdomen: Soft, nondistended.  Extremities: LUE bruised from fingers to bicep area, area of apparent skin weeping covered with dressing. RUE w/ bruising from fingers to forearm      LABS:                        8.3    8.29  )-----------( 143      ( 03 Jan 2023 03:50 )             25.8     01-03    145  |  110<H>  |  70<H>  ----------------------------<  92  3.9   |  26  |  2.69<H>    Ca    8.1<L>      03 Jan 2023 03:50  Phos  3.0     01-03  Mg     2.2     01-03      PT/INR - ( 03 Jan 2023 03:50 )   PT: 14.8 sec;   INR: 1.27 ratio         PTT - ( 03 Jan 2023 03:50 )  PTT:43.9 sec          IMAGING:

## 2023-01-03 NOTE — CHART NOTE - NSCHARTNOTEFT_GEN_A_CORE
Incidental findings are findings on history, physical examination, lab work, and imaging that are not directly related to the patient's chief complaint and cause for hospital admission.     1. The incidental findings for this patient are (please list):  - nonspecific 0.9 x 0.6 x 0.6 cm nodular lesion along the lateral aspect of the left inferior rectus muscle  - Atrophic pancreas. Likely vascular calcifications at the level of the pancreatic head. Probable adjacent cysts at the pancreatic head and neck measuring up to 1 cm      2. Were these findings explained to the patient and/or their family (in the event that either the patient requests communication with their family or the patient is unable to understand or remember the findings and plan)? yes      3. Was a copy of the lab work/ imaging report given to the patient and/or their family? yes    4. Was the patient asked whether they can follow up with their PMD regarding this finding? yes    5. Was the finding documented on the discharge summary?        Trauma Surgery  [phone]

## 2023-01-03 NOTE — CONSULT NOTE ADULT - SUBJECTIVE AND OBJECTIVE BOX
Wound Surgery Consult Note:    HPI:  HPI:   89 year old male history of CAD, s/p CABG, PPM, HTN, CKD 4, HLD, gout, upper GI bleed 2/2022, recent COVID 12/25/22, presenting as a level 1 trauma activation after being found down on the ground in his backyard by his son. Son states he found his father next to his walker unresponsive. Patient arrived hypothermic to 78F, hypotensive to 60/40, responded to 110's/70's with warm IVF resuscitation, HR 70's, O2 100%, GCS 10 (4,2,4)    Request for wound care consult for sacral/bilateral buttocks and Right lower leg skin damage received from nursing. Mr. Castillo was encountered on an alternating air with low air loss surface and was seen with his clinical nurse. He was grossly incontinent of stool on exam and had an indwelling urethral catheter managing urine. Skin tear on right lower leg likely result of trauma from the fall. Though a  for the deaf was in the room and available, he did not offer any information related to his wounds/condition. Deep maroon discoloration on the upper aspect of his sacrum with distal torn skin noted. In the setting of fall and unknown time down, this discoloration may represent deep tissue injury. His extreme immobility, inactivity, gross incontinence of stool as well as poor nutritional status all contribute to his high risk for pressure injury development and hinder healing. Identification of the initial signs of deep tissue damage at the level of the skin within 72 hours of admission make this an injury that occurred prior to admission.    PAST MEDICAL & SURGICAL HISTORY:  S/P CABG x 3  CKD (chronic kidney disease)  Anemia  Gout  BPH (benign prostatic hyperplasia)  Hypertension  Hypercholesterolemia  CAD (coronary artery disease)  Pacemaker  History of esophagogastroduodenoscopy (EGD)  Artificial cardiac pacemaker  S/P CABG x 1    REVIEW OF SYSTEMS:  Unable to obtain due to patient's mental status;  for the Deaf in room and available    MEDICATIONS  (STANDING):  artificial  tears Solution 1 Drop(s) Both EYES four times a day  bacitracin   Ointment 1 Application(s) Topical two times a day  chlorhexidine 2% Cloths 1 Application(s) Topical <User Schedule>  enoxaparin Injectable 30 milliGRAM(s) SubCutaneous every 24 hours  epoetin sherrie-epbx (RETACRIT) Injectable 41053 Unit(s) SubCutaneous <User Schedule>  folic acid Injectable 1 milliGRAM(s) IV Push every 24 hours  iron sucrose Injectable 200 milliGRAM(s) IV Push every 24 hours  meropenem  IVPB 500 milliGRAM(s) IV Intermittent every 12 hours  sodium chloride 0.65% Nasal 1 Spray(s) Both Nostrils daily    MEDICATIONS  (PRN):  acetaminophen   IVPB .. 750 milliGRAM(s) IV Intermittent every 6 hours PRN Mild Pain (1 - 3)  HYDROmorphone  Injectable 0.25 milliGRAM(s) IV Push every 3 hours PRN Severe Pain (7 - 10)    Allergies    No Known Allergies    Intolerances    SOCIAL HISTORY:  patient not able to offer due to mental status    FAMILY HISTORY:  No pertinent family history in first degree relatives    Vital Signs Last 24 Hrs  T(C): 36.4 (03 Jan 2023 11:00), Max: 37 (02 Jan 2023 23:00)  T(F): 97.5 (03 Jan 2023 11:00), Max: 98.6 (02 Jan 2023 23:00)  HR: 60 (03 Jan 2023 14:10) (59 - 80)  BP: 92/47 (03 Jan 2023 14:10) (83/45 - 145/60)  BP(mean): 67 (03 Jan 2023 14:10) (59 - 94)  RR: 18 (03 Jan 2023 14:10) (10 - 26)  SpO2: 97% (03 Jan 2023 14:10) (92% - 100%)    Parameters below as of 03 Jan 2023 14:00  Patient On (Oxygen Delivery Method): nasal cannula  O2 Flow (L/min): 2    Physical Exam:  General: Alert, obese  Ophthamology: sclera clear  ENMT: moist mucous membranes, trachea midline  Respiratory: equal chest rise with respirations  Gastrointestinal: soft NT/ND  Neurology: nonverbal, not following commands  Psych: calm, appropriate  Musculoskeletal: no contractures  Vascular: BLE edema equal  Skin:  Sacral/bilateral buttocks with deep maroon discoloration over the upper aspect of the sacrum and superficially denuded skin tear in and around the gluteal cleft L 4cm X W 2cm x D 0.1cm with pink wound bed, no necrotic tissue, and scant serosanguinous drainage  RIght anterior lower leg wound - torn skin revealing superficially denuded skin tear L 3cm X W 2cm x D 0.1cm with pink wound bed, no necrotic tissue, and scant serosanguinous drainage  No odor, erythema, increased warmth, tenderness, induration, fluctuance    LABS:  01-03    145  |  110<H>  |  70<H>  ----------------------------<  92  3.9   |  26  |  2.69<H>    Ca    8.1<L>      03 Jan 2023 03:50  Phos  3.0     01-03  Mg     2.2     01-03                            8.3    8.29  )-----------( 143      ( 03 Jan 2023 03:50 )             25.8     PT/INR - ( 03 Jan 2023 03:50 )   PT: 14.8 sec;   INR: 1.27 ratio         PTT - ( 03 Jan 2023 03:50 )  PTT:43.9 sec      RADIOLOGY & ADDITIONAL STUDIES:    EXAM:  CT CHEST                        ACC: [id number] EXAM:  CT ABDOMEN AND PELVIS                          PROCEDURE DATE:  12/29/2022      INTERPRETATION:  CLINICAL INFORMATION: Trauma, fall down stairs    COMPARISON: CT chest 12/13/2019    CONTRAST/COMPLICATIONS:  IV Contrast: NONE  Oral Contrast: NONE  Complications: None reported at time of study completion    PROCEDURE:  CT of the Chest, Abdomen and Pelvis was performed.  Sagittal and coronal reformats were performed.    FINDINGS: Some images are degraded by artifacts from the patient's arms   within the scanning field of view. Motion artifacts degrade some of the   imaging. Evaluation is less sensitive without IV contrast.    CHEST:  LUNGS AND LARGE AIRWAYS: Endotracheal tube terminates within the mid   trachea. Respiratory motion artifact. Airway wall thickening. Air-fluid   levels within bilateral lower lobe lobar bronchi. Fluid density within   majority of bilateral lower lobe segmental bronchi.  Interlobular septal thickening and patchy bilateral lower lobe mostly   groundglass opacities with small areas of consolidation may represent   pulmonary interstitial and alveolar edema versus underlying airspace   disease.  Minimal peripheral right upper lobe mixed interstitial and alveolar   infiltrate.  Small right lower lobe air cyst is similar compared with prior exam   probably representing a bulla versus pneumatocele.  Probable left lower lobe pulmonary nodules measuring up to 7 mm are   partially obscured by motion artifact and airspace disease.  PLEURA: Small pleural effusions, larger on the right, with adjacent   atelectasis. No pneumothorax  VESSELS: Atherosclerotic changes of the aorta and native coronary   arteries.  HEART: Mild cardiomegaly No pericardial effusion. CABG.  Hypodensity of the blood pool in relation to left ventricular myocardium   suggests underlying anemia.  Cardiac generator pack implanted within the anterior left upper chest   wall with right atrial and ventricular electrodes as well as a single   retained abandoned lead curled inferiorly within the right atrium  MEDIASTINUM AND PRASHANTH: Calcified right hilar and mediastinal lymph nodes.  CHEST WALL AND LOWER NECK: Small volume of intravascular air within the   right internal jugular and subclavian veins as well as right upper   extremity and chest branch veins likely correlates with venous catheter   placement attempt.  Right upper extremity venous varicosities measuring up to approximately   2.2 cm  Nondisplaced subacute to chronic fractures of right sixth through ninth   ribs posteriorly. Healed sternotomy without wires  Mild bilateral gynecomastia.    ABDOMEN AND PELVIS:  LIVER: Unremarkable as imaged  BILE DUCTS: Normal caliber.  GALLBLADDER: Cholelithiasis.  SPLEEN: Within normal size limits.  PANCREAS: Atrophic. Likely vascular calcifications at the level of the   pancreatic head. Probable adjacent cysts at the pancreatic head and neck   measuring up to 1 cm  ADRENALS: Within normal limits.  KIDNEYS/URETERS: Mildly decreased renal length bilaterally. No   hydronephrosis or renal calculus.  Variably sized bilateral hypodensities probably represent cysts although   are incompletely assessed by this exam    BLADDER: Partially obscured by metallic imaging artifact from the left   total hip arthroplasty. Contains Davis catheter and small volume   nondependent intraluminal air.  REPRODUCTIVE ORGANS: Obscured by imaging artifact    BOWEL: Unobstructed. Colonic diverticulosis  PERITONEUM: Small bowel mesenteric edema. Small volume dependent pelvic   fluid  VESSELS: Atherosclerotic changes. Right femoral arterial catheter   terminates within the mid right external iliac artery  RETROPERITONEUM/LYMPH NODES: No lymphadenopathy.  ABDOMINAL WALL: Cachexia  BONES: Left total hip arthroplasty. Multilevel degenerative changes   throughout the spine.    IMPRESSION:  No imaging evidence of acute traumatic injury involving the chest,   abdomen or pelvis.    Likely aspirated material within bilateral lower lobes.    Pulmonary interstitial edema with probable alveolar edema versus   pneumonia or aspiration. Small pleural effusions

## 2023-01-03 NOTE — CONSULT NOTE ADULT - ASSESSMENT
Impression:    Sacral/bilateral Buttocks deep tissue injury present on admission  RIght anterior lower leg wound  Incontinence of bowel and bladder  Incontinence Dermatitis    Recommend:  1.) topical therapy: sacral/buttock injury - cleanse with incontinence cleanser, pat dry, apply Triad ointment BID and PRN for incontinent episodes  Right anterior lower leg wound - cleanse with NS, pat dry, apply adaptic, cover with DSD, secure with luke every other day  2.) Incontinence Management - incontinence cleanser, pads, pericare BID  3.) Maintain on an alternating air with low air loss surface  4.) Turn and reposition Q 2 hours  5.) Nutrition optimization - please add [place]  6.) Offload heels/feet with complete cair air fluidized boots; ensure that the soles of the feet are not resting on the foot board of the bed.    Care as per medicine. Will not actively follow but will remain available. Please recall for new issues or deterioration.  Upon [facility] [address] [place] [address] [phone]  Thank you for this consult  Seen and discussed with clinical nurse  Maranda Prince, NUNU-C, CWOCN [id number]
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 He has been managed supportively with Procrit as out-pt and he responds well to that and have been able to avoid transfusions. He is here after falling and being found out of his home and with hypothermia. He also has covid. He has respiratory failure and he is intubated. He has been requiring periodic transfusions. Hgb today higher and no transfusion needed. Spoke with ICU team, and suggested starting Epo and they agree so will order that today. He is getting iv iron, but ferritin is high and that is not needed from hematology perspective. CKD is at baseline. Thrombocytopenia is mild. He has had that on and off and may have ITP or possible MDS as above. Also infection playing a role with bone marrow suppression. It is mild at this time and can monitor.   
89 y/o male w/ a PMHx of congenital deafness (communicates w/ ASL), CAD s/p triple vessel CABG, s/p biventricular PPM, HTN, HLD, CKD stage IV, BPH, gout, and anemia presenting after being found down outside hypothermic, hypotensive, and altered requiring intubation for airway protection w/ imaging revealing bilateral nasal bone and nasal septum fractures    PLAN:    Neuro: altered mental status, intubated  - CT head negative for any acute pathology but given patient's altered mental status over the last few days prior to presentation, will monitor off sedation despite being intubated and send cultures, utox, & alcohol level   - Multimodal pain control w/ standing IV acetaminophen and low dose PRN Dilaudid IVP    Resp: intubated for altered mental status, bilateral pleural effusions  - Mechanical ventilation for as patient was intubated for altered mental status  - Will SBT w/ goal to extubate when patient is awake & able to follow commands  - Will watch bilateral pleural effusions    CV: hypovolemic shock  - Will wean vasopressor support w/ goal MAP > 65 mmHg  - Patient w/ significant bleeding from facial lacerations so will transfuse 2 units of PRBCs    GI: no acute issues  - NPO  - Unable to place OGT as patient has significant edema of the soft palate so will reattempt tomorrow  - Protonix for stress ulcer prophylaxis    Renal: LUCY on CKD stage IV (baseline Cr 2-3)  - NS at 75 mL/hr while NPO  - LUCY likely secondary to hypovolemia & hypotension w/ plans to fluid resuscitate w/ 2 units of PRBCs    Heme: acute blood loss anemia  - Monitor CBC and coags  - Holding chemical VTE prophylaxis and home ASA for now given significant facial bleeding so Venodynes for mechanical VTE prophylaxis    ID: COVID-19  - Will not give remdesivir for now given patient's LUCY  - If hypotension does not improve w/ fluid resuscitation, will start empiric antibiotics given possible superimposed PNA on CT scan  - Will send procalcitonin    Endo: hyperglycemia  - Monitor glucose w/ ISS q6hrs for glycemic control  - Will send HgbA1C    MSK: bilateral nasal bone & nasal septum fracture, facial lacerations  - Outpatient follow-up w/ plastics  - Sinus precautions  - Facial lacerations repaired w/ Nylon sutures on 12/29    Code Status: Full code    Disposition: Will admit to [facility]    Patti Anna PA-C     [id number]

## 2023-01-03 NOTE — PROGRESS NOTE ADULT - ASSESSMENT
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 He has been managed supportively with Procrit as out-pt and he responds well to that and have been able to avoid transfusions. He is here after falling and being found out of his home and with hypothermia.   He also has covid.   He has respiratory failure and had been intubated.    He has been requiring periodic transfusions.  Hgb today higher and no transfusion needed.   Dr. Pelletier spoke with ICU team, and he is back on epo.     He is getting iv iron, but ferritin is high and that is not needed from hematology perspective.   CKD is at baseline. Thrombocytopenia is mild. He has had that on and off and may have ITP or possible MDS as above. Also infection playing a role with bone marrow suppression. It is mild at this time and can monitor.

## 2023-01-03 NOTE — PROGRESS NOTE ADULT - SUBJECTIVE AND OBJECTIVE BOX
24 HOUR EVENTS:  - CTH head negative, CT C-spine negative and C- collar cleared  - Extubated, family discussed GOC and plan is for reintubation if needed  - Remains NPO, coughing, will hold off on dysphagia screen until AM  - Started on Retacrit by Heme    SUBJECTIVE/ROS:  [ X ] A ten-point review of systems was otherwise negative except as noted.  [ ] Due to altered mental status/intubation, subjective information were not able to be obtained from the patient. History was obtained, to the extent possible, from review of the chart and collateral sources of information.      NEURO  Exam: AOx2, deaf. Follows commands. Moves all extremities.   Meds: acetaminophen   IVPB .. 750 milliGRAM(s) IV Intermittent every 6 hours PRN Mild Pain (1 - 3)  HYDROmorphone  Injectable 0.25 milliGRAM(s) IV Push every 3 hours PRN Severe Pain (7 - 10)    [x] Adequacy of sedation and pain control has been assessed and adjusted      RESPIRATORY  RR: 22 (01-02-23 @ 23:30) (17 - 26)  SpO2: 94% (01-02-23 @ 23:30) (94% - 100%)  Wt(kg): --  Exam: CTA b/l. No murmurs, rubs, gallops appreciated.   Mechanical Ventilation: Mode: CPAP with PS, RR (patient): 18, FiO2: 30, PEEP: 5, PS: 5, PIP: 11  ABG - ( 02 Jan 2023 14:09 )  pH: 7.42  /  pCO2: 42    /  pO2: 103   / HCO3: 27    / Base Excess: 2.5   /  SaO2: 99.2    Lactate: x                [ ] Extubation Readiness Assessed  Meds:       CARDIOVASCULAR  HR: 60 (01-02-23 @ 23:30) (60 - 80)  BP: 96/50 (01-02-23 @ 23:30) (96/50 - 145/60)  BP(mean): 69 (01-02-23 @ 23:30) (69 - 101)  ABP: 102/30 (01-02-23 @ 23:30) (101/34 - 153/54)  ABP(mean): 51 (01-02-23 @ 23:30) (51 - 87)  Wt(kg): --  CVP(cm H2O): --      Exam: S1S2. No murmurs, rubs, gallops appreciated.  Cardiac Rhythm: Paced rhythm rate 60  Meds:       GI/NUTRITION  Exam: Soft, non-distended, non-tender.   Diet: NPO  Meds:     GENITOURINARY  I&O's Detail    01-01 @ 07:01  -  01-02 @ 07:00  --------------------------------------------------------  IN:    DOBUTamine: 18 mL    Enteral Tube Flush: 315 mL    IV PiggyBack: 100 mL    Jevity 1.5: 1060 mL    Norepinephrine: 8.5 mL    PRBCs (Packed Red Blood Cells): 300 mL    Sodium Bicarbonate: 120 mL  Total IN: 1921.5 mL    OUT:    Indwelling Catheter - Urethral (mL): 1420 mL  Total OUT: 1420 mL    Total NET: 501.5 mL      01-02 @ 07:01 - 01-03 @ 00:03  --------------------------------------------------------  IN:    Enteral Tube Flush: 70 mL    IV PiggyBack: 50 mL    Jevity 1.5: 315 mL  Total IN: 435 mL    OUT:    Indwelling Catheter - Urethral (mL): 715 mL  Total OUT: 715 mL    Total NET: -280 mL          01-02    140  |  107  |  73<H>  ----------------------------<  185<H>  4.1   |  24  |  3.00<H>    Ca    7.9<L>      02 Jan 2023 00:20  Phos  3.6     01-02  Mg     2.2     01-02      [ X ] Davis catheter, indication: N/A  Meds: folic acid Injectable 1 milliGRAM(s) IV Push every 24 hours  iron sucrose Injectable 200 milliGRAM(s) IV Push every 24 hours        HEMATOLOGIC  Meds: enoxaparin Injectable 30 milliGRAM(s) SubCutaneous every 24 hours    [x] VTE Prophylaxis                        8.9    11.15 )-----------( 141      ( 02 Jan 2023 17:10 )             28.0     PT/INR - ( 02 Jan 2023 00:20 )   PT: 14.0 sec;   INR: 1.20 ratio         PTT - ( 02 Jan 2023 00:20 )  PTT:47.7 sec  Transfusion     [ ] PRBC   [ ] Platelets   [ ] FFP   [ ] Cryoprecipitate      INFECTIOUS DISEASES  T(C): 37 (01-02-23 @ 23:00), Max: 37.6 (01-02-23 @ 07:00)  Wt(kg): --  WBC Count: 11.15 K/uL (01-02 @ 17:10)  WBC Count: 11.12 K/uL (01-02 @ 04:38)  WBC Count: 10.32 K/uL (01-02 @ 00:20)    Recent Cultures:  Specimen Source: .Blood Blood-Peripheral, 12-31 @ 00:53; Results   No growth to date.; Gram Stain: --; Organism: --  Specimen Source: .Blood Blood-Peripheral, 12-31 @ 00:50; Results   No growth to date.; Gram Stain: --; Organism: --  Specimen Source: Combi-Cath Combi-Cath, 12-29 @ 16:18; Results   Normal Respiratory Dariela present; Gram Stain:   Rare polymorphonuclear leukocytes per low power field  No Squamous epithelial cells per low power field  No organisms seen; Organism: --  Specimen Source: .Blood Blood-Peripheral, 12-29 @ 07:35; Results   Growth in anaerobic bottle: Streptococcus mitis/oralis group  Alpha hemolytic strep  (not Strep. pneumoniae or Enterococcus)  Single set isolate, possible contaminant. Contact  Microbiology if susceptibility testing clinically  indicated.; Gram Stain:   Growth in anaerobic bottle: Gram Positive Cocci in Pairs and Chains; Organism: Blood Culture PCR  Specimen Source: .Blood Blood-Peripheral, 12-29 @ 07:33; Results   No growth to date.; Gram Stain: --; Organism: --    Meds: epoetin sherrie-epbx (RETACRIT) Injectable 49669 Unit(s) SubCutaneous <User Schedule>  meropenem  IVPB 500 milliGRAM(s) IV Intermittent every 12 hours        ENDOCRINE  Capillary Blood Glucose    Meds: insulin lispro (ADMELOG) corrective regimen sliding scale   SubCutaneous every 6 hours        ACCESS DEVICES:  [ X ] Peripheral IV  [ X ] Central Venous Line	[ X ] R	[ ] L	[ X ] IJ	[ ] Fem	[ ] SC	Placed: 12/29  [ X ] Arterial Line		[ X ] R	[ ] L	[ X ] Fem	[ ] Rad	[ ] Ax	Placed: 12/29  [ ] PICC:					[ ] Mediport  [ X ] Urinary Catheter, Date Placed: 12/29  [ ] Necessity of urinary, arterial, and venous catheters discussed    OTHER MEDICATIONS:  artificial  tears Solution 1 Drop(s) Both EYES four times a day  bacitracin   Ointment 1 Application(s) Topical two times a day  chlorhexidine 2% Cloths 1 Application(s) Topical <User Schedule>  sodium chloride 0.65% Nasal 1 Spray(s) Both Nostrils daily      CODE STATUS: DNR - NOT DNI    IMAGING:

## 2023-01-03 NOTE — PROGRESS NOTE ADULT - NS ATTEND AMEND GEN_ALL_CORE FT
fall , hypothermic and shock, now resolved, but poor mental status    ON: extubated and c collar removed. started EPO per heme    alert and awake and communicates with sign eliu (pt is deaf)  IV tylenol, PRN dilaudid  on RA, encourage IS  AM CXR largely unchanged  stable hemodynamics  s/p dobutamine, vasopressors  home meds: lasix, metop. hold metop  swallow screening before advancing diet, NPO  monitor for BMs  baez in place, net slight neg  downtrending cr 2.6  hyperchloremic  IV lasix restart  EPO per heme recommendations  lovenox PPX  COVID pos  afebrile  gpc bcx initally, thought to be contaminant  merop empiric  procal 1.16, repeat procal tomorrow  good glycemic control  PT OT today,  for placement  family visits daily, keep updated  full code, but pt has living will which family will bring    dc a line, CVC

## 2023-01-03 NOTE — CHART NOTE - NSCHARTNOTEFT_GEN_A_CORE
TRAUMA SERVICE TERTIARY EXAM    Date of TTS:  1/3/2023                   Admit Date:   12/29/2022                   Trauma Activation: level one  Admit GCS: E-4     V-2     M- 4    HPI:   89 year old male history of CAD, s/p CABG, PPM, HTN, CKD 4, HLD, gout, upper GI bleed 2/2022, recent COVID 12/25/22, presenting as a level 1 trauma activation after being found down on the ground in his backyard by his son. Son states he found his father next to his walker unresponsive. Patient arrived hypothermic to 78F, hypotensive to 60/40, responded to 110's/70's with warm IVF resuscitation, HR 70's, O2 100%, GCS 10 (4,2,4)      PAST MEDICAL & SURGICAL HISTORY:  S/P CABG x 3      CKD (chronic kidney disease)      Anemia      Gout      BPH (benign prostatic hyperplasia)      Hypertension      Hypercholesterolemia      CAD (coronary artery disease)      Pacemaker      History of esophagogastroduodenoscopy (EGD)      Artificial cardiac pacemaker      S/P CABG x 1      Primary Survey:   A - airway intact  B - bilateral breath sounds and good chest rise  C - initial BP  BP:  60/40 (12-29-22 @ 08:18), HR HR: 79 (12-29-22 @ 08:49) , palpable pulses in all extremities  D - GCS 10 on arrival  Exposure obtained      Secondary Survey:   General: Altered, elderly male  HEENT: Dry blood throughout scalp and face, 3cm full-thickness laceration above left eyebrow, L eye periorbital ecchymosis, L nare laceration, dry blood in both nares and on tongue, no active bleeding anywhere, EOMI, PEERL.  Neck: Soft, midline trachea.  Chest: No chest wall deformities.   Cardiac: S1, S2, RRR  Respiratory: Bilateral breath sounds, clear and equal bilaterally  Abdomen: Soft, non-distended, no masses palpated  Groin: Normal appearing  Ext: palp brachial b/l UE, b/l femoral palp, cannot assess motor and sensory function due to AMS  Back: no palpable stepoff/deformity  Rectal: No fe blood    eFAST negative  CXR and PXR in trauma bay unremarkable    TERTIARY SURVEY:   GEN: resting in bed, NAD, minimally arousable, grimaces w/ tactile stimulation, deaf  HEENT: normocephalic, non-tender to palpation, no step-offs palpated, left eyebrow laceration repaired w/ sutures, b/l periorbital ecchymosis L>R  NECK: no JVD, non-tender to palpation at posterior midline, no pain with flexion, extension, and bilateral neck rotation  CHEST: non-tender to palpation across clavicles and b/l anterior ribs  BACK: non-tender to palpation along cervical, thoracic, lumbar spine midline and b/l posterior ribs; no palpable step-offs or hematomas  ABD: soft, non-distended, non-tender to palpation in all quadrants without rebound tenderness or guarding  LUE: non-tender to palpation across upper and lower arm, fingers warm, well-perfused, edematous w/ small area of weeping from lateral forearm, palpable radial + ulnar pulses  RUE: non-tender to palpation across upper and lower arm, fingers warm, well-perfused, edematous, palpable radial + ulnar pulses  LLE: non-tender to palpation across upper and lower leg; warm, well-perfused  RLE: non-tender to palpation across upper and lower leg; warm, well-perfused, healing laceration just distal to anterior knee  NEURO: minimally arousable, grimace response w/ tactile stimulation    Medications (inpatient): artificial  tears Solution 1 Drop(s) Both EYES four times a day  bacitracin   Ointment 1 Application(s) Topical two times a day  chlorhexidine 2% Cloths 1 Application(s) Topical <User Schedule>  enoxaparin Injectable 30 milliGRAM(s) SubCutaneous every 24 hours  epoetin sherrie-epbx (RETACRIT) Injectable 68326 Unit(s) SubCutaneous <User Schedule>  folic acid Injectable 1 milliGRAM(s) IV Push every 24 hours  furosemide   Injectable 20 milliGRAM(s) IV Push once  insulin lispro (ADMELOG) corrective regimen sliding scale   SubCutaneous every 6 hours  iron sucrose Injectable 200 milliGRAM(s) IV Push every 24 hours  meropenem  IVPB 500 milliGRAM(s) IV Intermittent every 12 hours  sodium chloride 0.65% Nasal 1 Spray(s) Both Nostrils daily    Medications (PRN):acetaminophen   IVPB .. 750 milliGRAM(s) IV Intermittent every 6 hours PRN  HYDROmorphone  Injectable 0.25 milliGRAM(s) IV Push every 3 hours PRN    Allergies: No Known Allergies  (Intolerances: )    Vital Signs Last 24 Hrs  T(C): 36.7 (03 Jan 2023 03:00), Max: 37 (02 Jan 2023 23:00)  T(F): 98.1 (03 Jan 2023 03:00), Max: 98.6 (02 Jan 2023 23:00)  HR: 61 (03 Jan 2023 08:00) (59 - 80)  BP: 107/54 (03 Jan 2023 08:00) (88/47 - 145/60)  BP(mean): 78 (03 Jan 2023 08:00) (64 - 96)  RR: 22 (03 Jan 2023 08:00) (18 - 26)  SpO2: 96% (03 Jan 2023 08:00) (92% - 100%)    Parameters below as of 03 Jan 2023 08:00  Patient On (Oxygen Delivery Method): room air      Drug Dosing Weight  Height (cm): 167.6 (29 Dec 2022 17:00)  Weight (kg): 60 (29 Dec 2022 09:39)  BMI (kg/m2): 21.4 (29 Dec 2022 17:00)  BSA (m2): 1.68 (29 Dec 2022 17:00)                          8.3    8.29  )-----------( 143      ( 03 Jan 2023 03:50 )             25.8     01-03    145  |  110<H>  |  70<H>  ----------------------------<  92  3.9   |  26  |  2.69<H>    Ca    8.1<L>      03 Jan 2023 03:50  Phos  3.0     01-03  Mg     2.2     01-03      PT/INR - ( 03 Jan 2023 03:50 )   PT: 14.8 sec;   INR: 1.27 ratio         PTT - ( 03 Jan 2023 03:50 )  PTT:43.9 sec      List Operative and Interventional Radiological Procedures:     Consults (Date):  [  ] Neurosurgery   [  ] Orthopedics  [x] Plastics  [  ] Urology  [  ] PM&R  [  ] Social Work  [x] Heme/Onc    RADIOLOGICAL FINDINGS REVIEW:  CXR:    Pelvis Films:     C-Spine Films:    T/L/S Spine Films:    Extremity Films:    Head CT:    C-Spine CT:    Neck CT:    Chest CT:    ABD/Pelvis CT:    Other:    ASSESSMENT:   89yo Male with Hx     PLAN:       Known Injuries: TRAUMA SERVICE TERTIARY EXAM    Date of TTS:  1/3/2023                   Admit Date:   12/29/2022                   Trauma Activation: level one  Admit GCS: E-4     V-2     M- 4    HPI:   89 year old male history of CAD, s/p CABG, PPM, HTN, CKD 4, HLD, gout, upper GI bleed 2/2022, recent COVID 12/25/22, presenting as a level 1 trauma activation after being found down on the ground in his backyard by his son. Son states he found his father next to his walker unresponsive. Patient arrived hypothermic to 78F, hypotensive to 60/40, responded to 110's/70's with warm IVF resuscitation, HR 70's, O2 100%, GCS 10 (4,2,4)      PAST MEDICAL & SURGICAL HISTORY:  S/P CABG x 3      CKD (chronic kidney disease)      Anemia      Gout      BPH (benign prostatic hyperplasia)      Hypertension      Hypercholesterolemia      CAD (coronary artery disease)      Pacemaker      History of esophagogastroduodenoscopy (EGD)      Artificial cardiac pacemaker      S/P CABG x 1      Primary Survey:   A - airway intact  B - bilateral breath sounds and good chest rise  C - initial BP  BP:  60/40 (12-29-22 @ 08:18), HR HR: 79 (12-29-22 @ 08:49) , palpable pulses in all extremities  D - GCS 10 on arrival  Exposure obtained      Secondary Survey:   General: Altered, elderly male  HEENT: Dry blood throughout scalp and face, 3cm full-thickness laceration above left eyebrow, L eye periorbital ecchymosis, L nare laceration, dry blood in both nares and on tongue, no active bleeding anywhere, EOMI, PEERL.  Neck: Soft, midline trachea.  Chest: No chest wall deformities.   Cardiac: S1, S2, RRR  Respiratory: Bilateral breath sounds, clear and equal bilaterally  Abdomen: Soft, non-distended, no masses palpated  Groin: Normal appearing  Ext: palp brachial b/l UE, b/l femoral palp, cannot assess motor and sensory function due to AMS  Back: no palpable stepoff/deformity  Rectal: No fe blood    eFAST negative  CXR and PXR in trauma bay unremarkable    TERTIARY SURVEY:   GEN: resting in bed, NAD, minimally arousable, grimaces w/ tactile stimulation, deaf  HEENT: normocephalic, non-tender to palpation, no step-offs palpated, left eyebrow laceration repaired w/ sutures, b/l periorbital ecchymosis L>R  NECK: no JVD, non-tender to palpation at posterior midline, no pain with flexion, extension, and bilateral neck rotation  CHEST: non-tender to palpation across clavicles and b/l anterior ribs  BACK: non-tender to palpation along cervical, thoracic, lumbar spine midline and b/l posterior ribs; no palpable step-offs or hematomas  ABD: soft, non-distended, non-tender to palpation in all quadrants without rebound tenderness or guarding  LUE: non-tender to palpation across upper and lower arm, fingers warm, well-perfused, edematous w/ small area of weeping from lateral forearm, palpable radial + ulnar pulses  RUE: non-tender to palpation across upper and lower arm, fingers warm, well-perfused, edematous, palpable radial + ulnar pulses  LLE: non-tender to palpation across upper and lower leg; warm, well-perfused  RLE: non-tender to palpation across upper and lower leg; warm, well-perfused, healing laceration just distal to anterior knee  NEURO: minimally arousable, grimace response w/ tactile stimulation    Medications (inpatient): artificial  tears Solution 1 Drop(s) Both EYES four times a day  bacitracin   Ointment 1 Application(s) Topical two times a day  chlorhexidine 2% Cloths 1 Application(s) Topical <User Schedule>  enoxaparin Injectable 30 milliGRAM(s) SubCutaneous every 24 hours  epoetin sherrie-epbx (RETACRIT) Injectable 39802 Unit(s) SubCutaneous <User Schedule>  folic acid Injectable 1 milliGRAM(s) IV Push every 24 hours  furosemide   Injectable 20 milliGRAM(s) IV Push once  insulin lispro (ADMELOG) corrective regimen sliding scale   SubCutaneous every 6 hours  iron sucrose Injectable 200 milliGRAM(s) IV Push every 24 hours  meropenem  IVPB 500 milliGRAM(s) IV Intermittent every 12 hours  sodium chloride 0.65% Nasal 1 Spray(s) Both Nostrils daily    Medications (PRN):acetaminophen   IVPB .. 750 milliGRAM(s) IV Intermittent every 6 hours PRN  HYDROmorphone  Injectable 0.25 milliGRAM(s) IV Push every 3 hours PRN    Allergies: No Known Allergies  (Intolerances: )    Vital Signs Last 24 Hrs  T(C): 36.7 (03 Jan 2023 03:00), Max: 37 (02 Jan 2023 23:00)  T(F): 98.1 (03 Jan 2023 03:00), Max: 98.6 (02 Jan 2023 23:00)  HR: 61 (03 Jan 2023 08:00) (59 - 80)  BP: 107/54 (03 Jan 2023 08:00) (88/47 - 145/60)  BP(mean): 78 (03 Jan 2023 08:00) (64 - 96)  RR: 22 (03 Jan 2023 08:00) (18 - 26)  SpO2: 96% (03 Jan 2023 08:00) (92% - 100%)    Parameters below as of 03 Jan 2023 08:00  Patient On (Oxygen Delivery Method): room air      Drug Dosing Weight  Height (cm): 167.6 (29 Dec 2022 17:00)  Weight (kg): 60 (29 Dec 2022 09:39)  BMI (kg/m2): 21.4 (29 Dec 2022 17:00)  BSA (m2): 1.68 (29 Dec 2022 17:00)                          8.3    8.29  )-----------( 143      ( 03 Jan 2023 03:50 )             25.8     01-03    145  |  110<H>  |  70<H>  ----------------------------<  92  3.9   |  26  |  2.69<H>    Ca    8.1<L>      03 Jan 2023 03:50  Phos  3.0     01-03  Mg     2.2     01-03      PT/INR - ( 03 Jan 2023 03:50 )   PT: 14.8 sec;   INR: 1.27 ratio         PTT - ( 03 Jan 2023 03:50 )  PTT:43.9 sec      List Operative and Interventional Radiological Procedures:     Consults (Date):  [  ] Neurosurgery   [  ] Orthopedics  [x] Plastics  [  ] Urology  [  ] PM&R  [  ] Social Work  [x] Heme/Onc    RADIOLOGICAL FINDINGS REVIEW:  CXR:    12/29 FINDINGS:    Left chest wall pacemaker. Mediastinal surgical clips.  The heart is similar in size to prior.  Right costophrenic angle is not well visualized. Mild pulmonary congestion.  There is no pleural effusion.  There is no pneumothorax.  No acute bony abnormality.    IMPRESSION:  Mild congestive changes.    Further information may be obtained from the patient's subsequent CT of the chest.    Pelvis Films:     12/29 IMPRESSION:  Superimposed material obscures underlying fine osseous detail. Overexposed peripheral proximal right femur further limits evaluation.    Intact aligned and unremarkable appearing partially visualized previously seen cementless left total hip prosthesis.    To extent discernible, no dislocations, displaced fractures, or discrete suspicious fracture lines however also correlate with appearance of relevant osseous structures on subsequently performed abdomen/pelvis CT for more detailed evaluation.    Preserved right hip joint space.    Lower lumbar spine degenerative change apparent.  Generalized osteopenia otherwise no discrete lytic or blastic lesions.  Long narrow right groin central vascular access cannula with tip over upper right hemisacral region.  Vertically oriented braided temperature probe with tip over lower midpelvis.    C-Spine Films:    T/L/S Spine Films:    Extremity Films:    Head/C-Spine/Maxillofacial CT:    12/29 FINDINGS:  CT head:  Large left anterior frontal scalp hematoma and swelling.  The ventricles, cisterns and sulci are prominent, consistent with generalized volume loss. There is no acute loss of gray-white differentiation. There are moderate patchy and confluent areas of hypodensity in the periventricular and subcortical white matter which are likely related to chronic microangiopathic changes.    There is no evidence of mass effect, midline shift, acute intracranial hemorrhage, or extra-axial collections.    The calvarium is intact.      CT cervical spine:    There is maintenance of usual cervical lordosis. There is no significant subluxation. Vertebral body height is preserved throughout the cervical spine. Moderate to severe multilevel disc space narrowing at C4-C7.    Mild multilevel diffuse disc osteophyte complexes indent the thecal sac and contribute to central canal narrowing. No definite high-grade central canal stenosis. Multilevel high-grade facet hypertrophy and uncovertebral spurring contribute to variable degree neural foraminal stenosis throughout the cervical spine.    The paravertebral soft tissues are unremarkable. Small right pleural effusion.    CT maxillofacial:  Large left periorbital soft tissue swelling and hematoma. The bony orbits are intact. There has been prior bilateral cataract surgery. The globes are symmetric and intact. There is no retrobulbar hematoma. The extraocular muscles and optic nerve sheath complexes are unremarkable. There is a nonspecific 0.9 x 0.6 x 0.6 cm nodular lesion along the lateral aspect of the left inferior rectus muscle.    Comminuted bilateral nasal bone fractures. Nondisplaced bony nasal septal fracture. The mandible, maxilla, zygoma and pterygoid plates are intact.    Moderate mucosal of the ethmoid air cells. Moderate mucosal thickening in both maxillary sinuses moderate mucosal thickening of both sphenoid sinuses. Moderate likely hemorrhagic fluid level in the right sphenoid sinus. Trace opacification of the left mastoid air cells inferiorly.    IMPRESSION:  CT HEAD: Large left anterior frontal scalp hematoma and swelling There is no acute intracranial hemorrhage or depressed calvarial fracture.    CT CERVICAL SPINE: No fracture or acute traumatic malalignment. Degenerative changes as described.    CT maxillofacial: Comminuted bilateral nasal bone fractures and fracture of the bony nasal septum. Large left periorbital swelling and hematoma. The bony orbits and intraocular contents are intact. No retrobulbar hematoma. There is a nonspecific 0.9 cm nodular lesion along the lateral aspect of the left inferior rectus muscle. Further evaluation may be obtained with nonemergent contrast-enhanced MRI of the orbits if no contraindication.        Neck CT:    Chest CT:    ABD/Pelvis CT:    Other:    ASSESSMENT:   89yo Male with Hx     PLAN:       Known Injuries: TRAUMA SERVICE TERTIARY EXAM    Date of TTS:  1/3/2023                   Admit Date:   12/29/2022                   Trauma Activation: level one  Admit GCS: E-4     V-2     M- 4    HPI:   89 year old male history of CAD, s/p CABG, PPM, HTN, CKD 4, HLD, gout, upper GI bleed 2/2022, recent COVID 12/25/22, presenting as a level 1 trauma activation after being found down on the ground in his backyard by his son. Son states he found his father next to his walker unresponsive. Patient arrived hypothermic to 78F, hypotensive to 60/40, responded to 110's/70's with warm IVF resuscitation, HR 70's, O2 100%, GCS 10 (4,2,4)      PAST MEDICAL & SURGICAL HISTORY:  S/P CABG x 3      CKD (chronic kidney disease)      Anemia      Gout      BPH (benign prostatic hyperplasia)      Hypertension      Hypercholesterolemia      CAD (coronary artery disease)      Pacemaker      History of esophagogastroduodenoscopy (EGD)      Artificial cardiac pacemaker      S/P CABG x 1      Primary Survey:   A - airway intact  B - bilateral breath sounds and good chest rise  C - initial BP  BP:  60/40 (12-29-22 @ 08:18), HR HR: 79 (12-29-22 @ 08:49) , palpable pulses in all extremities  D - GCS 10 on arrival  Exposure obtained      Secondary Survey:   General: Altered, elderly male  HEENT: Dry blood throughout scalp and face, 3cm full-thickness laceration above left eyebrow, L eye periorbital ecchymosis, L nare laceration, dry blood in both nares and on tongue, no active bleeding anywhere, EOMI, PEERL.  Neck: Soft, midline trachea.  Chest: No chest wall deformities.   Cardiac: S1, S2, RRR  Respiratory: Bilateral breath sounds, clear and equal bilaterally  Abdomen: Soft, non-distended, no masses palpated  Groin: Normal appearing  Ext: palp brachial b/l UE, b/l femoral palp, cannot assess motor and sensory function due to AMS  Back: no palpable stepoff/deformity  Rectal: No fe blood    eFAST negative  CXR and PXR in trauma bay unremarkable    TERTIARY SURVEY:   GEN: resting in bed, NAD, minimally arousable, grimaces w/ tactile stimulation, deaf  HEENT: normocephalic, non-tender to palpation, no step-offs palpated, left eyebrow laceration repaired w/ sutures, b/l periorbital ecchymosis L>R  NECK: no JVD, non-tender to palpation at posterior midline, no pain with flexion, extension, and bilateral neck rotation  CHEST: non-tender to palpation across clavicles and b/l anterior ribs  BACK: non-tender to palpation along cervical, thoracic, lumbar spine midline and b/l posterior ribs; no palpable step-offs or hematomas  ABD: soft, non-distended, non-tender to palpation in all quadrants without rebound tenderness or guarding  LUE: non-tender to palpation across upper and lower arm, fingers warm, well-perfused, edematous w/ small area of weeping from lateral forearm, palpable radial + ulnar pulses  RUE: non-tender to palpation across upper and lower arm, fingers warm, well-perfused, edematous, palpable radial + ulnar pulses  LLE: non-tender to palpation across upper and lower leg; warm, well-perfused  RLE: non-tender to palpation across upper and lower leg; warm, well-perfused, healing laceration just distal to anterior knee  NEURO: minimally arousable, grimace response w/ tactile stimulation    Medications (inpatient): artificial  tears Solution 1 Drop(s) Both EYES four times a day  bacitracin   Ointment 1 Application(s) Topical two times a day  chlorhexidine 2% Cloths 1 Application(s) Topical <User Schedule>  enoxaparin Injectable 30 milliGRAM(s) SubCutaneous every 24 hours  epoetin sherrie-epbx (RETACRIT) Injectable 14862 Unit(s) SubCutaneous <User Schedule>  folic acid Injectable 1 milliGRAM(s) IV Push every 24 hours  furosemide   Injectable 20 milliGRAM(s) IV Push once  insulin lispro (ADMELOG) corrective regimen sliding scale   SubCutaneous every 6 hours  iron sucrose Injectable 200 milliGRAM(s) IV Push every 24 hours  meropenem  IVPB 500 milliGRAM(s) IV Intermittent every 12 hours  sodium chloride 0.65% Nasal 1 Spray(s) Both Nostrils daily    Medications (PRN):acetaminophen   IVPB .. 750 milliGRAM(s) IV Intermittent every 6 hours PRN  HYDROmorphone  Injectable 0.25 milliGRAM(s) IV Push every 3 hours PRN    Allergies: No Known Allergies  (Intolerances: )    Vital Signs Last 24 Hrs  T(C): 36.7 (03 Jan 2023 03:00), Max: 37 (02 Jan 2023 23:00)  T(F): 98.1 (03 Jan 2023 03:00), Max: 98.6 (02 Jan 2023 23:00)  HR: 61 (03 Jan 2023 08:00) (59 - 80)  BP: 107/54 (03 Jan 2023 08:00) (88/47 - 145/60)  BP(mean): 78 (03 Jan 2023 08:00) (64 - 96)  RR: 22 (03 Jan 2023 08:00) (18 - 26)  SpO2: 96% (03 Jan 2023 08:00) (92% - 100%)    Parameters below as of 03 Jan 2023 08:00  Patient On (Oxygen Delivery Method): room air      Drug Dosing Weight  Height (cm): 167.6 (29 Dec 2022 17:00)  Weight (kg): 60 (29 Dec 2022 09:39)  BMI (kg/m2): 21.4 (29 Dec 2022 17:00)  BSA (m2): 1.68 (29 Dec 2022 17:00)                          8.3    8.29  )-----------( 143      ( 03 Jan 2023 03:50 )             25.8     01-03    145  |  110<H>  |  70<H>  ----------------------------<  92  3.9   |  26  |  2.69<H>    Ca    8.1<L>      03 Jan 2023 03:50  Phos  3.0     01-03  Mg     2.2     01-03      PT/INR - ( 03 Jan 2023 03:50 )   PT: 14.8 sec;   INR: 1.27 ratio         PTT - ( 03 Jan 2023 03:50 )  PTT:43.9 sec      List Operative and Interventional Radiological Procedures:     Consults (Date):  [  ] Neurosurgery   [  ] Orthopedics  [x] Plastics  [  ] Urology  [  ] PM&R  [  ] Social Work  [x] Heme/Onc    RADIOLOGICAL FINDINGS REVIEW:  CXR:    12/29 FINDINGS:    Left chest wall pacemaker. Mediastinal surgical clips.  The heart is similar in size to prior.  Right costophrenic angle is not well visualized. Mild pulmonary congestion.  There is no pleural effusion.  There is no pneumothorax.  No acute bony abnormality.    IMPRESSION:  Mild congestive changes.    Further information may be obtained from the patient's subsequent CT of the chest.        Pelvis Films:     12/29 IMPRESSION:  Superimposed material obscures underlying fine osseous detail. Overexposed peripheral proximal right femur further limits evaluation.    Intact aligned and unremarkable appearing partially visualized previously seen cementless left total hip prosthesis.    To extent discernible, no dislocations, displaced fractures, or discrete suspicious fracture lines however also correlate with appearance of relevant osseous structures on subsequently performed abdomen/pelvis CT for more detailed evaluation.    Preserved right hip joint space.    Lower lumbar spine degenerative change apparent.  Generalized osteopenia otherwise no discrete lytic or blastic lesions.  Long narrow right groin central vascular access cannula with tip over upper right hemisacral region.  Vertically oriented braided temperature probe with tip over lower midpelvis.        Head/C-Spine/Maxillofacial CT:    1/2 FINDINGS:    No acute intracranial hemorrhage, mass effect, or midline shift. No abnormal extra-axial collection. Cerebral volume loss with proportional prominence of the sulci and ventricles. Moderate patchy periventricular white matter hypoattenuation, likely the sequela of chronic microvascular changes. No hydrocephalus.    Mucosal thickening within the bilateral maxillary sinuses. Layering mucosal secretions within the bilateral sphenoid ethmoid sinuses. Tympanomastoid cavities are clear. Partially imaged endotracheal and orogastric tubes. Bilateral intraocular lens replacements. Redemonstrated bilateral comminuted nasal bone fractures. Calvarium is intact. Interval decrease in left frontal scalp hematoma. Left parietal scalp swelling is present. Overlying EEG leads are seen    IMPRESSION:    No acute intracranial hemorrhage, mass effect, or midline shift.    Decreasing left frontal scalp hematoma and redemonstrated comminuted bilateral nasal bone fractures.    12/29 FINDINGS:  CT head:  Large left anterior frontal scalp hematoma and swelling.  The ventricles, cisterns and sulci are prominent, consistent with generalized volume loss. There is no acute loss of gray-white differentiation. There are moderate patchy and confluent areas of hypodensity in the periventricular and subcortical white matter which are likely related to chronic microangiopathic changes.    There is no evidence of mass effect, midline shift, acute intracranial hemorrhage, or extra-axial collections.    The calvarium is intact.      CT cervical spine:    There is maintenance of usual cervical lordosis. There is no significant subluxation. Vertebral body height is preserved throughout the cervical spine. Moderate to severe multilevel disc space narrowing at C4-C7.    Mild multilevel diffuse disc osteophyte complexes indent the thecal sac and contribute to central canal narrowing. No definite high-grade central canal stenosis. Multilevel high-grade facet hypertrophy and uncovertebral spurring contribute to variable degree neural foraminal stenosis throughout the cervical spine.    The paravertebral soft tissues are unremarkable. Small right pleural effusion.    CT maxillofacial:  Large left periorbital soft tissue swelling and hematoma. The bony orbits are intact. There has been prior bilateral cataract surgery. The globes are symmetric and intact. There is no retrobulbar hematoma. The extraocular muscles and optic nerve sheath complexes are unremarkable. There is a nonspecific 0.9 x 0.6 x 0.6 cm nodular lesion along the lateral aspect of the left inferior rectus muscle.    Comminuted bilateral nasal bone fractures. Nondisplaced bony nasal septal fracture. The mandible, maxilla, zygoma and pterygoid plates are intact.    Moderate mucosal of the ethmoid air cells. Moderate mucosal thickening in both maxillary sinuses moderate mucosal thickening of both sphenoid sinuses. Moderate likely hemorrhagic fluid level in the right sphenoid sinus. Trace opacification of the left mastoid air cells inferiorly.    IMPRESSION:  CT HEAD: Large left anterior frontal scalp hematoma and swelling There is no acute intracranial hemorrhage or depressed calvarial fracture.    CT CERVICAL SPINE: No fracture or acute traumatic malalignment. Degenerative changes as described.    CT maxillofacial: Comminuted bilateral nasal bone fractures and fracture of the bony nasal septum. Large left periorbital swelling and hematoma. The bony orbits and intraocular contents are intact. No retrobulbar hematoma. There is a nonspecific 0.9 cm nodular lesion along the lateral aspect of the left inferior rectus muscle. Further evaluation may be obtained with nonemergent contrast-enhanced MRI of the orbits if no contraindication.        Chest/ABD/Pelvis CT:      12/29 FINDINGS: Some images are degraded by artifacts from the patient's arms within the scanning field of view. Motion artifacts degrade some of the imaging. Evaluation is less sensitive without IV contrast.    CHEST:  LUNGS AND LARGE AIRWAYS: Endotracheal tube terminates within the mid trachea. Respiratory motion artifact. Airway wall thickening. Air-fluid levels within bilateral lower lobe lobar bronchi. Fluid density within majority of bilateral lower lobe segmental bronchi.  Interlobular septal thickening and patchy bilateral lower lobe mostly groundglass opacities with small areas of consolidation may represent pulmonary interstitial and alveolar edema versus underlying airspace disease.  Minimal peripheral right upper lobe mixed interstitial and alveolar infiltrate.  Small right lower lobe air cyst is similar compared with prior exam probably representing a bulla versus pneumatocele.  Probable left lower lobe pulmonary nodules measuring up to 7 mm are partially obscured by motion artifact and airspace disease.  PLEURA: Small pleural effusions, larger on the right, with adjacent atelectasis. No pneumothorax  VESSELS: Atherosclerotic changes of the aorta and native coronary arteries.  HEART: Mild cardiomegaly No pericardial effusion. CABG.  Hypodensity of the blood pool in relation to left ventricular myocardium suggests underlying anemia.  Cardiac generator pack implanted within the anterior left upper chest wall with right atrial and ventricular electrodes as well as a single retained abandoned lead curled inferiorly within the right atrium  MEDIASTINUM AND PRASHANTH: Calcified right hilar and mediastinal lymph nodes.  CHEST WALL AND LOWER NECK: Small volume of intravascular air within the right internal jugular and subclavian veins as well as right upper extremity and chest branch veins likely correlates with venous catheter placement attempt.  Right upper extremity venous varicosities measuring up to approximately 2.2 cm  Nondisplaced subacute to chronic fractures of right sixth through ninth ribs posteriorly. Healed sternotomy without wires  Mild bilateral gynecomastia.    ABDOMEN AND PELVIS:  LIVER: Unremarkable as imaged  BILE DUCTS: Normal caliber.  GALLBLADDER: Cholelithiasis.  SPLEEN: Within normal size limits.  PANCREAS: Atrophic. Likely vascular calcifications at the level of the pancreatic head. Probable adjacent cysts at the pancreatic head and neck measuring up to 1 cm  ADRENALS: Within normal limits.  KIDNEYS/URETERS: Mildly decreased renal length bilaterally. No hydronephrosis or renal calculus.  Variably sized bilateral hypodensities probably represent cysts although are incompletely assessed by this exam    BLADDER: Partially obscured by metallic imaging artifact from the left total hip arthroplasty. Contains Davis catheter and small volume nondependent intraluminal air.  REPRODUCTIVE ORGANS: Obscured by imaging artifact    BOWEL: Unobstructed. Colonic diverticulosis  PERITONEUM: Small bowel mesenteric edema. Small volume dependent pelvic fluid  VESSELS: Atherosclerotic changes. Right femoral arterial catheter terminates within the mid right external iliac artery  RETROPERITONEUM/LYMPH NODES: No lymphadenopathy.  ABDOMINAL WALL: Cachexia  BONES: Left total hip arthroplasty. Multilevel degenerative changes throughout the spine.    IMPRESSION:  No imaging evidence of acute traumatic injury involving the chest, abdomen or pelvis.    Likely aspirated material within bilateral lower lobes.    Pulmonary interstitial edema with probable alveolar edema versus pneumonia or aspiration. Small pleural effusions      ASSESSMENT:   89yo Male with Hx     PLAN:       Known Injuries: TRAUMA SERVICE TERTIARY EXAM    Date of TTS:  1/3/2023                   Admit Date:   12/29/2022                   Trauma Activation: level one  Admit GCS: E-4     V-2     M- 4    HPI:   89 year old male history of CAD, s/p CABG, PPM, HTN, CKD 4, HLD, gout, upper GI bleed 2/2022, recent COVID 12/25/22, presenting as a level 1 trauma activation after being found down on the ground in his backyard by his son. Son states he found his father next to his walker unresponsive. Patient arrived hypothermic to 78F, hypotensive to 60/40, responded to 110's/70's with warm IVF resuscitation, HR 70's, O2 100%, GCS 10 (4,2,4)      PAST MEDICAL & SURGICAL HISTORY:  S/P CABG x 3      CKD (chronic kidney disease)      Anemia      Gout      BPH (benign prostatic hyperplasia)      Hypertension      Hypercholesterolemia      CAD (coronary artery disease)      Pacemaker      History of esophagogastroduodenoscopy (EGD)      Artificial cardiac pacemaker      S/P CABG x 1      Primary Survey:   A - airway intact  B - bilateral breath sounds and good chest rise  C - initial BP  BP:  60/40 (12-29-22 @ 08:18), HR HR: 79 (12-29-22 @ 08:49) , palpable pulses in all extremities  D - GCS 10 on arrival  Exposure obtained      Secondary Survey:   General: Altered, elderly male  HEENT: Dry blood throughout scalp and face, 3cm full-thickness laceration above left eyebrow, L eye periorbital ecchymosis, L nare laceration, dry blood in both nares and on tongue, no active bleeding anywhere, EOMI, PEERL.  Neck: Soft, midline trachea.  Chest: No chest wall deformities.   Cardiac: S1, S2, RRR  Respiratory: Bilateral breath sounds, clear and equal bilaterally  Abdomen: Soft, non-distended, no masses palpated  Groin: Normal appearing  Ext: palp brachial b/l UE, b/l femoral palp, cannot assess motor and sensory function due to AMS  Back: no palpable stepoff/deformity  Rectal: No fe blood    eFAST negative  CXR and PXR in trauma bay unremarkable    TERTIARY SURVEY:   GEN: resting in bed, NAD, minimally arousable, grimaces w/ tactile stimulation, deaf  HEENT: normocephalic, non-tender to palpation, no step-offs palpated, left eyebrow laceration repaired w/ sutures, b/l periorbital ecchymosis L>R  NECK: no JVD, non-tender to palpation at posterior midline, no pain with flexion, extension, and bilateral neck rotation  CHEST: non-tender to palpation across clavicles and b/l anterior ribs  BACK: non-tender to palpation along cervical, thoracic, lumbar spine midline and b/l posterior ribs; no palpable step-offs or hematomas  ABD: soft, non-distended, non-tender to palpation in all quadrants without rebound tenderness or guarding  LUE: non-tender to palpation across upper and lower arm, fingers warm, well-perfused, edematous w/ small area of weeping from lateral forearm, palpable radial + ulnar pulses  RUE: non-tender to palpation across upper and lower arm, fingers warm, well-perfused, edematous, palpable radial + ulnar pulses  LLE: non-tender to palpation across upper and lower leg; warm, well-perfused  RLE: non-tender to palpation across upper and lower leg; warm, well-perfused, healing laceration just distal to anterior knee  NEURO: minimally arousable, grimace response w/ tactile stimulation    Medications (inpatient): artificial  tears Solution 1 Drop(s) Both EYES four times a day  bacitracin   Ointment 1 Application(s) Topical two times a day  chlorhexidine 2% Cloths 1 Application(s) Topical <User Schedule>  enoxaparin Injectable 30 milliGRAM(s) SubCutaneous every 24 hours  epoetin sherrie-epbx (RETACRIT) Injectable 30345 Unit(s) SubCutaneous <User Schedule>  folic acid Injectable 1 milliGRAM(s) IV Push every 24 hours  furosemide   Injectable 20 milliGRAM(s) IV Push once  insulin lispro (ADMELOG) corrective regimen sliding scale   SubCutaneous every 6 hours  iron sucrose Injectable 200 milliGRAM(s) IV Push every 24 hours  meropenem  IVPB 500 milliGRAM(s) IV Intermittent every 12 hours  sodium chloride 0.65% Nasal 1 Spray(s) Both Nostrils daily    Medications (PRN):acetaminophen   IVPB .. 750 milliGRAM(s) IV Intermittent every 6 hours PRN  HYDROmorphone  Injectable 0.25 milliGRAM(s) IV Push every 3 hours PRN    Allergies: No Known Allergies  (Intolerances: )    Vital Signs Last 24 Hrs  T(C): 36.7 (03 Jan 2023 03:00), Max: 37 (02 Jan 2023 23:00)  T(F): 98.1 (03 Jan 2023 03:00), Max: 98.6 (02 Jan 2023 23:00)  HR: 61 (03 Jan 2023 08:00) (59 - 80)  BP: 107/54 (03 Jan 2023 08:00) (88/47 - 145/60)  BP(mean): 78 (03 Jan 2023 08:00) (64 - 96)  RR: 22 (03 Jan 2023 08:00) (18 - 26)  SpO2: 96% (03 Jan 2023 08:00) (92% - 100%)    Parameters below as of 03 Jan 2023 08:00  Patient On (Oxygen Delivery Method): room air      Drug Dosing Weight  Height (cm): 167.6 (29 Dec 2022 17:00)  Weight (kg): 60 (29 Dec 2022 09:39)  BMI (kg/m2): 21.4 (29 Dec 2022 17:00)  BSA (m2): 1.68 (29 Dec 2022 17:00)                          8.3    8.29  )-----------( 143      ( 03 Jan 2023 03:50 )             25.8     01-03    145  |  110<H>  |  70<H>  ----------------------------<  92  3.9   |  26  |  2.69<H>    Ca    8.1<L>      03 Jan 2023 03:50  Phos  3.0     01-03  Mg     2.2     01-03      PT/INR - ( 03 Jan 2023 03:50 )   PT: 14.8 sec;   INR: 1.27 ratio         PTT - ( 03 Jan 2023 03:50 )  PTT:43.9 sec      List Operative and Interventional Radiological Procedures:     Consults (Date):  [  ] Neurosurgery   [  ] Orthopedics  [x] Plastics  [  ] Urology  [  ] PM&R  [  ] Social Work  [x] Heme/Onc    RADIOLOGICAL FINDINGS REVIEW:  CXR:    12/29 FINDINGS:    Left chest wall pacemaker. Mediastinal surgical clips.  The heart is similar in size to prior.  Right costophrenic angle is not well visualized. Mild pulmonary congestion.  There is no pleural effusion.  There is no pneumothorax.  No acute bony abnormality.    IMPRESSION:  Mild congestive changes.    Further information may be obtained from the patient's subsequent CT of the chest.        Pelvis Films:     12/29 IMPRESSION:  Superimposed material obscures underlying fine osseous detail. Overexposed peripheral proximal right femur further limits evaluation.    Intact aligned and unremarkable appearing partially visualized previously seen cementless left total hip prosthesis.    To extent discernible, no dislocations, displaced fractures, or discrete suspicious fracture lines however also correlate with appearance of relevant osseous structures on subsequently performed abdomen/pelvis CT for more detailed evaluation.    Preserved right hip joint space.    Lower lumbar spine degenerative change apparent.  Generalized osteopenia otherwise no discrete lytic or blastic lesions.  Long narrow right groin central vascular access cannula with tip over upper right hemisacral region.  Vertically oriented braided temperature probe with tip over lower midpelvis.        Head/C-Spine/Maxillofacial CT:    1/2 FINDINGS:    No acute intracranial hemorrhage, mass effect, or midline shift. No abnormal extra-axial collection. Cerebral volume loss with proportional prominence of the sulci and ventricles. Moderate patchy periventricular white matter hypoattenuation, likely the sequela of chronic microvascular changes. No hydrocephalus.    Mucosal thickening within the bilateral maxillary sinuses. Layering mucosal secretions within the bilateral sphenoid ethmoid sinuses. Tympanomastoid cavities are clear. Partially imaged endotracheal and orogastric tubes. Bilateral intraocular lens replacements. Redemonstrated bilateral comminuted nasal bone fractures. Calvarium is intact. Interval decrease in left frontal scalp hematoma. Left parietal scalp swelling is present. Overlying EEG leads are seen    IMPRESSION:    No acute intracranial hemorrhage, mass effect, or midline shift.    Decreasing left frontal scalp hematoma and redemonstrated comminuted bilateral nasal bone fractures.    12/29 FINDINGS:  CT head:  Large left anterior frontal scalp hematoma and swelling.  The ventricles, cisterns and sulci are prominent, consistent with generalized volume loss. There is no acute loss of gray-white differentiation. There are moderate patchy and confluent areas of hypodensity in the periventricular and subcortical white matter which are likely related to chronic microangiopathic changes.    There is no evidence of mass effect, midline shift, acute intracranial hemorrhage, or extra-axial collections.    The calvarium is intact.      CT cervical spine:    There is maintenance of usual cervical lordosis. There is no significant subluxation. Vertebral body height is preserved throughout the cervical spine. Moderate to severe multilevel disc space narrowing at C4-C7.    Mild multilevel diffuse disc osteophyte complexes indent the thecal sac and contribute to central canal narrowing. No definite high-grade central canal stenosis. Multilevel high-grade facet hypertrophy and uncovertebral spurring contribute to variable degree neural foraminal stenosis throughout the cervical spine.    The paravertebral soft tissues are unremarkable. Small right pleural effusion.    CT maxillofacial:  Large left periorbital soft tissue swelling and hematoma. The bony orbits are intact. There has been prior bilateral cataract surgery. The globes are symmetric and intact. There is no retrobulbar hematoma. The extraocular muscles and optic nerve sheath complexes are unremarkable. There is a nonspecific 0.9 x 0.6 x 0.6 cm nodular lesion along the lateral aspect of the left inferior rectus muscle.    Comminuted bilateral nasal bone fractures. Nondisplaced bony nasal septal fracture. The mandible, maxilla, zygoma and pterygoid plates are intact.    Moderate mucosal of the ethmoid air cells. Moderate mucosal thickening in both maxillary sinuses moderate mucosal thickening of both sphenoid sinuses. Moderate likely hemorrhagic fluid level in the right sphenoid sinus. Trace opacification of the left mastoid air cells inferiorly.    IMPRESSION:  CT HEAD: Large left anterior frontal scalp hematoma and swelling There is no acute intracranial hemorrhage or depressed calvarial fracture.    CT CERVICAL SPINE: No fracture or acute traumatic malalignment. Degenerative changes as described.    CT maxillofacial: Comminuted bilateral nasal bone fractures and fracture of the bony nasal septum. Large left periorbital swelling and hematoma. The bony orbits and intraocular contents are intact. No retrobulbar hematoma. There is a nonspecific 0.9 cm nodular lesion along the lateral aspect of the left inferior rectus muscle. Further evaluation may be obtained with nonemergent contrast-enhanced MRI of the orbits if no contraindication.        Chest/ABD/Pelvis CT:      12/29 FINDINGS: Some images are degraded by artifacts from the patient's arms within the scanning field of view. Motion artifacts degrade some of the imaging. Evaluation is less sensitive without IV contrast.    CHEST:  LUNGS AND LARGE AIRWAYS: Endotracheal tube terminates within the mid trachea. Respiratory motion artifact. Airway wall thickening. Air-fluid levels within bilateral lower lobe lobar bronchi. Fluid density within majority of bilateral lower lobe segmental bronchi.  Interlobular septal thickening and patchy bilateral lower lobe mostly groundglass opacities with small areas of consolidation may represent pulmonary interstitial and alveolar edema versus underlying airspace disease.  Minimal peripheral right upper lobe mixed interstitial and alveolar infiltrate.  Small right lower lobe air cyst is similar compared with prior exam probably representing a bulla versus pneumatocele.  Probable left lower lobe pulmonary nodules measuring up to 7 mm are partially obscured by motion artifact and airspace disease.  PLEURA: Small pleural effusions, larger on the right, with adjacent atelectasis. No pneumothorax  VESSELS: Atherosclerotic changes of the aorta and native coronary arteries.  HEART: Mild cardiomegaly No pericardial effusion. CABG.  Hypodensity of the blood pool in relation to left ventricular myocardium suggests underlying anemia.  Cardiac generator pack implanted within the anterior left upper chest wall with right atrial and ventricular electrodes as well as a single retained abandoned lead curled inferiorly within the right atrium  MEDIASTINUM AND PRASHANTH: Calcified right hilar and mediastinal lymph nodes.  CHEST WALL AND LOWER NECK: Small volume of intravascular air within the right internal jugular and subclavian veins as well as right upper extremity and chest branch veins likely correlates with venous catheter placement attempt.  Right upper extremity venous varicosities measuring up to approximately 2.2 cm  Nondisplaced subacute to chronic fractures of right sixth through ninth ribs posteriorly. Healed sternotomy without wires  Mild bilateral gynecomastia.    ABDOMEN AND PELVIS:  LIVER: Unremarkable as imaged  BILE DUCTS: Normal caliber.  GALLBLADDER: Cholelithiasis.  SPLEEN: Within normal size limits.  PANCREAS: Atrophic. Likely vascular calcifications at the level of the pancreatic head. Probable adjacent cysts at the pancreatic head and neck measuring up to 1 cm  ADRENALS: Within normal limits.  KIDNEYS/URETERS: Mildly decreased renal length bilaterally. No hydronephrosis or renal calculus.  Variably sized bilateral hypodensities probably represent cysts although are incompletely assessed by this exam    BLADDER: Partially obscured by metallic imaging artifact from the left total hip arthroplasty. Contains Davis catheter and small volume nondependent intraluminal air.  REPRODUCTIVE ORGANS: Obscured by imaging artifact    BOWEL: Unobstructed. Colonic diverticulosis  PERITONEUM: Small bowel mesenteric edema. Small volume dependent pelvic fluid  VESSELS: Atherosclerotic changes. Right femoral arterial catheter terminates within the mid right external iliac artery  RETROPERITONEUM/LYMPH NODES: No lymphadenopathy.  ABDOMINAL WALL: Cachexia  BONES: Left total hip arthroplasty. Multilevel degenerative changes throughout the spine.    IMPRESSION:  No imaging evidence of acute traumatic injury involving the chest, abdomen or pelvis.    Likely aspirated material within bilateral lower lobes.    Pulmonary interstitial edema with probable alveolar edema versus pneumonia or aspiration. Small pleural effusions      ASSESSMENT:   89 year old male history of CAD, s/p CABG, PPM, HTN, CKD 4, HLD, gout, upper GI bleed 2/2022, recent COVID 12/25/22, presenting as a level 1 trauma activation after being found down unresponsive in his backyard by his son. Patient arrived hypothermic to 78F, hypotensive to 60/40, responded to 110's/70's with warm IVF resuscitation, HR 70's, O2 100%, GCS 10 (4,2,4). During initial resuscitation became progressively more obtunded and was intubated for airway protection. eFAST negative CXR and PXR in trauma bay unremarkable. Pt extubated on 1/2.    PLAN:  - multimodal pain control  - NPO  - started retacrit per heme  - continue empiric jesse  - outpatient plastics f/u  - excellent care per SICU      Known Injuries:  - Comminuted bilateral nasal bone fractures and fracture of the bony nasal septum  - Large left periorbital swelling and hematoma  - Large left anterior frontal scalp hematoma      Trauma Surgery  [id number] TRAUMA SERVICE TERTIARY EXAM    Date of TTS:  1/3/2023                   Admit Date:   12/29/2022                   Trauma Activation: level one  Admit GCS: E-4     V-2     M- 4    HPI:   89 year old male history of CAD, s/p CABG, PPM, HTN, CKD 4, HLD, gout, upper GI bleed 2/2022, recent COVID 12/25/22, presenting as a level 1 trauma activation after being found down on the ground in his backyard by his son. Son states he found his father next to his walker unresponsive. Patient arrived hypothermic to 78F, hypotensive to 60/40, responded to 110's/70's with warm IVF resuscitation, HR 70's, O2 100%, GCS 10 (4,2,4)      PAST MEDICAL & SURGICAL HISTORY:  S/P CABG x 3      CKD (chronic kidney disease)      Anemia      Gout      BPH (benign prostatic hyperplasia)      Hypertension      Hypercholesterolemia      CAD (coronary artery disease)      Pacemaker      History of esophagogastroduodenoscopy (EGD)      Artificial cardiac pacemaker      S/P CABG x 1      Primary Survey:   A - airway intact  B - bilateral breath sounds and good chest rise  C - initial BP  BP:  60/40 (12-29-22 @ 08:18), HR HR: 79 (12-29-22 @ 08:49) , palpable pulses in all extremities  D - GCS 10 on arrival  Exposure obtained      Secondary Survey:   General: Altered, elderly male  HEENT: Dry blood throughout scalp and face, 3cm full-thickness laceration above left eyebrow, L eye periorbital ecchymosis, L nare laceration, dry blood in both nares and on tongue, no active bleeding anywhere, EOMI, PEERL.  Neck: Soft, midline trachea.  Chest: No chest wall deformities.   Cardiac: S1, S2, RRR  Respiratory: Bilateral breath sounds, clear and equal bilaterally  Abdomen: Soft, non-distended, no masses palpated  Groin: Normal appearing  Ext: palp brachial b/l UE, b/l femoral palp, cannot assess motor and sensory function due to AMS  Back: no palpable stepoff/deformity  Rectal: No fe blood    eFAST negative  CXR and PXR in trauma bay unremarkable    TERTIARY SURVEY:   GEN: resting in bed, NAD, minimally arousable, grimaces w/ tactile stimulation, deaf  HEENT: normocephalic, non-tender to palpation, no step-offs palpated, left eyebrow laceration repaired w/ sutures, b/l periorbital ecchymosis L>R  NECK: no JVD, non-tender to palpation at posterior midline, no pain with flexion, extension, and bilateral neck rotation  CHEST: non-tender to palpation across clavicles and b/l anterior ribs  BACK: non-tender to palpation along cervical, thoracic, lumbar spine midline and b/l posterior ribs; no palpable step-offs or hematomas  ABD: soft, non-distended, non-tender to palpation in all quadrants without rebound tenderness or guarding  LUE: non-tender to palpation across upper and lower arm, fingers warm, well-perfused, edematous w/ small area of weeping from lateral forearm, palpable radial + ulnar pulses  RUE: non-tender to palpation across upper and lower arm, fingers warm, well-perfused, edematous, palpable radial + ulnar pulses  LLE: non-tender to palpation across upper and lower leg; warm, well-perfused  RLE: non-tender to palpation across upper and lower leg; warm, well-perfused, healing laceration just distal to anterior knee  NEURO: minimally arousable, grimace response w/ tactile stimulation    Medications (inpatient): artificial  tears Solution 1 Drop(s) Both EYES four times a day  bacitracin   Ointment 1 Application(s) Topical two times a day  chlorhexidine 2% Cloths 1 Application(s) Topical <User Schedule>  enoxaparin Injectable 30 milliGRAM(s) SubCutaneous every 24 hours  epoetin sherrie-epbx (RETACRIT) Injectable 96707 Unit(s) SubCutaneous <User Schedule>  folic acid Injectable 1 milliGRAM(s) IV Push every 24 hours  furosemide   Injectable 20 milliGRAM(s) IV Push once  insulin lispro (ADMELOG) corrective regimen sliding scale   SubCutaneous every 6 hours  iron sucrose Injectable 200 milliGRAM(s) IV Push every 24 hours  meropenem  IVPB 500 milliGRAM(s) IV Intermittent every 12 hours  sodium chloride 0.65% Nasal 1 Spray(s) Both Nostrils daily    Medications (PRN):acetaminophen   IVPB .. 750 milliGRAM(s) IV Intermittent every 6 hours PRN  HYDROmorphone  Injectable 0.25 milliGRAM(s) IV Push every 3 hours PRN    Allergies: No Known Allergies  (Intolerances: )    Vital Signs Last 24 Hrs  T(C): 36.7 (03 Jan 2023 03:00), Max: 37 (02 Jan 2023 23:00)  T(F): 98.1 (03 Jan 2023 03:00), Max: 98.6 (02 Jan 2023 23:00)  HR: 61 (03 Jan 2023 08:00) (59 - 80)  BP: 107/54 (03 Jan 2023 08:00) (88/47 - 145/60)  BP(mean): 78 (03 Jan 2023 08:00) (64 - 96)  RR: 22 (03 Jan 2023 08:00) (18 - 26)  SpO2: 96% (03 Jan 2023 08:00) (92% - 100%)    Parameters below as of 03 Jan 2023 08:00  Patient On (Oxygen Delivery Method): room air      Drug Dosing Weight  Height (cm): 167.6 (29 Dec 2022 17:00)  Weight (kg): 60 (29 Dec 2022 09:39)  BMI (kg/m2): 21.4 (29 Dec 2022 17:00)  BSA (m2): 1.68 (29 Dec 2022 17:00)                          8.3    8.29  )-----------( 143      ( 03 Jan 2023 03:50 )             25.8     01-03    145  |  110<H>  |  70<H>  ----------------------------<  92  3.9   |  26  |  2.69<H>    Ca    8.1<L>      03 Jan 2023 03:50  Phos  3.0     01-03  Mg     2.2     01-03      PT/INR - ( 03 Jan 2023 03:50 )   PT: 14.8 sec;   INR: 1.27 ratio         PTT - ( 03 Jan 2023 03:50 )  PTT:43.9 sec      List Operative and Interventional Radiological Procedures:     Consults (Date):  [  ] Neurosurgery   [  ] Orthopedics  [x] Plastics  [  ] Urology  [  ] PM&R  [  ] Social Work  [x] Heme/Onc    RADIOLOGICAL FINDINGS REVIEW:  CXR:    12/29 FINDINGS:    Left chest wall pacemaker. Mediastinal surgical clips.  The heart is similar in size to prior.  Right costophrenic angle is not well visualized. Mild pulmonary congestion.  There is no pleural effusion.  There is no pneumothorax.  No acute bony abnormality.    IMPRESSION:  Mild congestive changes.    Further information may be obtained from the patient's subsequent CT of the chest.        Pelvis Films:     12/29 IMPRESSION:  Superimposed material obscures underlying fine osseous detail. Overexposed peripheral proximal right femur further limits evaluation.    Intact aligned and unremarkable appearing partially visualized previously seen cementless left total hip prosthesis.    To extent discernible, no dislocations, displaced fractures, or discrete suspicious fracture lines however also correlate with appearance of relevant osseous structures on subsequently performed abdomen/pelvis CT for more detailed evaluation.    Preserved right hip joint space.    Lower lumbar spine degenerative change apparent.  Generalized osteopenia otherwise no discrete lytic or blastic lesions.  Long narrow right groin central vascular access cannula with tip over upper right hemisacral region.  Vertically oriented braided temperature probe with tip over lower midpelvis.        Head/C-Spine/Maxillofacial CT:    1/2 FINDINGS:    No acute intracranial hemorrhage, mass effect, or midline shift. No abnormal extra-axial collection. Cerebral volume loss with proportional prominence of the sulci and ventricles. Moderate patchy periventricular white matter hypoattenuation, likely the sequela of chronic microvascular changes. No hydrocephalus.    Mucosal thickening within the bilateral maxillary sinuses. Layering mucosal secretions within the bilateral sphenoid ethmoid sinuses. Tympanomastoid cavities are clear. Partially imaged endotracheal and orogastric tubes. Bilateral intraocular lens replacements. Redemonstrated bilateral comminuted nasal bone fractures. Calvarium is intact. Interval decrease in left frontal scalp hematoma. Left parietal scalp swelling is present. Overlying EEG leads are seen    IMPRESSION:    No acute intracranial hemorrhage, mass effect, or midline shift.    Decreasing left frontal scalp hematoma and redemonstrated comminuted bilateral nasal bone fractures.    12/29 FINDINGS:  CT head:  Large left anterior frontal scalp hematoma and swelling.  The ventricles, cisterns and sulci are prominent, consistent with generalized volume loss. There is no acute loss of gray-white differentiation. There are moderate patchy and confluent areas of hypodensity in the periventricular and subcortical white matter which are likely related to chronic microangiopathic changes.    There is no evidence of mass effect, midline shift, acute intracranial hemorrhage, or extra-axial collections.    The calvarium is intact.      CT cervical spine:    There is maintenance of usual cervical lordosis. There is no significant subluxation. Vertebral body height is preserved throughout the cervical spine. Moderate to severe multilevel disc space narrowing at C4-C7.    Mild multilevel diffuse disc osteophyte complexes indent the thecal sac and contribute to central canal narrowing. No definite high-grade central canal stenosis. Multilevel high-grade facet hypertrophy and uncovertebral spurring contribute to variable degree neural foraminal stenosis throughout the cervical spine.    The paravertebral soft tissues are unremarkable. Small right pleural effusion.    CT maxillofacial:  Large left periorbital soft tissue swelling and hematoma. The bony orbits are intact. There has been prior bilateral cataract surgery. The globes are symmetric and intact. There is no retrobulbar hematoma. The extraocular muscles and optic nerve sheath complexes are unremarkable. There is a nonspecific 0.9 x 0.6 x 0.6 cm nodular lesion along the lateral aspect of the left inferior rectus muscle.    Comminuted bilateral nasal bone fractures. Nondisplaced bony nasal septal fracture. The mandible, maxilla, zygoma and pterygoid plates are intact.    Moderate mucosal of the ethmoid air cells. Moderate mucosal thickening in both maxillary sinuses moderate mucosal thickening of both sphenoid sinuses. Moderate likely hemorrhagic fluid level in the right sphenoid sinus. Trace opacification of the left mastoid air cells inferiorly.    IMPRESSION:  CT HEAD: Large left anterior frontal scalp hematoma and swelling There is no acute intracranial hemorrhage or depressed calvarial fracture.    CT CERVICAL SPINE: No fracture or acute traumatic malalignment. Degenerative changes as described.    CT maxillofacial: Comminuted bilateral nasal bone fractures and fracture of the bony nasal septum. Large left periorbital swelling and hematoma. The bony orbits and intraocular contents are intact. No retrobulbar hematoma. There is a nonspecific 0.9 cm nodular lesion along the lateral aspect of the left inferior rectus muscle. Further evaluation may be obtained with nonemergent contrast-enhanced MRI of the orbits if no contraindication.        Chest/ABD/Pelvis CT:      12/29 FINDINGS: Some images are degraded by artifacts from the patient's arms within the scanning field of view. Motion artifacts degrade some of the imaging. Evaluation is less sensitive without IV contrast.    CHEST:  LUNGS AND LARGE AIRWAYS: Endotracheal tube terminates within the mid trachea. Respiratory motion artifact. Airway wall thickening. Air-fluid levels within bilateral lower lobe lobar bronchi. Fluid density within majority of bilateral lower lobe segmental bronchi.  Interlobular septal thickening and patchy bilateral lower lobe mostly groundglass opacities with small areas of consolidation may represent pulmonary interstitial and alveolar edema versus underlying airspace disease.  Minimal peripheral right upper lobe mixed interstitial and alveolar infiltrate.  Small right lower lobe air cyst is similar compared with prior exam probably representing a bulla versus pneumatocele.  Probable left lower lobe pulmonary nodules measuring up to 7 mm are partially obscured by motion artifact and airspace disease.  PLEURA: Small pleural effusions, larger on the right, with adjacent atelectasis. No pneumothorax  VESSELS: Atherosclerotic changes of the aorta and native coronary arteries.  HEART: Mild cardiomegaly No pericardial effusion. CABG.  Hypodensity of the blood pool in relation to left ventricular myocardium suggests underlying anemia.  Cardiac generator pack implanted within the anterior left upper chest wall with right atrial and ventricular electrodes as well as a single retained abandoned lead curled inferiorly within the right atrium  MEDIASTINUM AND PRASHANTH: Calcified right hilar and mediastinal lymph nodes.  CHEST WALL AND LOWER NECK: Small volume of intravascular air within the right internal jugular and subclavian veins as well as right upper extremity and chest branch veins likely correlates with venous catheter placement attempt.  Right upper extremity venous varicosities measuring up to approximately 2.2 cm  Nondisplaced subacute to chronic fractures of right sixth through ninth ribs posteriorly. Healed sternotomy without wires  Mild bilateral gynecomastia.    ABDOMEN AND PELVIS:  LIVER: Unremarkable as imaged  BILE DUCTS: Normal caliber.  GALLBLADDER: Cholelithiasis.  SPLEEN: Within normal size limits.  PANCREAS: Atrophic. Likely vascular calcifications at the level of the pancreatic head. Probable adjacent cysts at the pancreatic head and neck measuring up to 1 cm  ADRENALS: Within normal limits.  KIDNEYS/URETERS: Mildly decreased renal length bilaterally. No hydronephrosis or renal calculus.  Variably sized bilateral hypodensities probably represent cysts although are incompletely assessed by this exam    BLADDER: Partially obscured by metallic imaging artifact from the left total hip arthroplasty. Contains Davis catheter and small volume nondependent intraluminal air.  REPRODUCTIVE ORGANS: Obscured by imaging artifact    BOWEL: Unobstructed. Colonic diverticulosis  PERITONEUM: Small bowel mesenteric edema. Small volume dependent pelvic fluid  VESSELS: Atherosclerotic changes. Right femoral arterial catheter terminates within the mid right external iliac artery  RETROPERITONEUM/LYMPH NODES: No lymphadenopathy.  ABDOMINAL WALL: Cachexia  BONES: Left total hip arthroplasty. Multilevel degenerative changes throughout the spine.    IMPRESSION:  No imaging evidence of acute traumatic injury involving the chest, abdomen or pelvis.    Likely aspirated material within bilateral lower lobes.    Pulmonary interstitial edema with probable alveolar edema versus pneumonia or aspiration. Small pleural effusions      ASSESSMENT:   89 year old male history of CAD, s/p CABG, PPM, HTN, CKD 4, HLD, gout, upper GI bleed 2/2022, recent COVID 12/25/22, presenting as a level 1 trauma activation after being found down unresponsive in his backyard by his son. Patient arrived hypothermic to 78F, hypotensive to 60/40, responded to 110's/70's with warm IVF resuscitation, HR 70's, O2 100%, GCS 10 (4,2,4). During initial resuscitation became progressively more obtunded and was intubated for airway protection. eFAST negative CXR and PXR in trauma bay unremarkable. Pt extubated on 1/2.    PLAN:  - multimodal pain control  - NPO  - started retacrit per heme  - continue empiric jesse  - outpatient plastics f/u  - incidentals reviewed w/ patient's son  - excellent care per SICU      Known Injuries:  - Comminuted bilateral nasal bone fractures and fracture of the bony nasal septum  - Large left periorbital swelling and hematoma  - Large left anterior frontal scalp hematoma      Trauma Surgery  [id number]

## 2023-01-03 NOTE — PROGRESS NOTE ADULT - ASSESSMENT
89 year old male history of CAD, s/p CABG, PPM, HTN, CKD 4, HLD, gout, upper GI bleed 2/2022, recent COVID 12/25/22, presenting as a level 1 trauma activation after being found down unresponsive in his backyard by his son. Patient arrived hypothermic to 78F, hypotensive to 60/40, responded to 110's/70's with warm IVF resuscitation, HR 70's, O2 100%, GCS 10 (4,2,4). During initial resuscitation became progressively more obtunded and was intubated for airway protection. eFAST negative CXR and PXR in trauma bay unremarkable. Pt extubated on 1/2.    PLAN:  - tertiary today  - Appreciate excellent care per Surgical Intensive Care Unit.     Surgery [phone]

## 2023-01-03 NOTE — PROGRESS NOTE ADULT - ASSESSMENT
Patient is a 89 y/o male w/ a PMHx of congenital deafness (communicates w/ ASL), CAD s/p triple vessel CABG, s/p biventricular PPM, HTN, HLD, CKD stage IV, BPH, gout, and anemia presenting after being found down outside hypothermic, hypotensive, and altered requiring intubation AMS w/ imaging revealing bilateral nasal bone and nasal septum fractures. Patient admits to SICU for shock, ?septic, hypothermic.     Neuro: altered mental status, intubated  -MS: opens eyes to touch, moves extr  - CTH head negative  - Extubated, off sedation. 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

## 2023-01-04 NOTE — PROGRESS NOTE ADULT - NS ATTEND AMEND GEN_ALL_CORE FT
fall , hypothermic and shock, now resolved, but poor mental status    ON: removed lines, speech eval pending    alert and awake and communicates with sign eliu (pt is deaf), sitting in chair  IV tylenol, PRN dilaudid  on 2 lit , wean as tolerated, encourage IS  AM CXR largely unchanged  stable hemodynamics  hold home metop  swallow eval formal today, NPO  monitor for BMs  baez to be removed, TOV  downtrending cr 2.5  hyperchloremic, hypernatremia   D5 @ 30  received lasix IV, repeat 40 IV today for goal even   EPO per heme recommendations every 2 wks  lovenox PPX  COVID pos  afebrile  gpc bcx initally, thought to be contaminant  procal 1.16->0.8  no clinical signs of infection, rpt cultures neg, will dc merop today  mild hypoglycemia, on D5   PT OT working with him, SW for placement  removal of facial sutures  family visits daily, keep updated  full code, but pt has living will which family will bring    PIVs

## 2023-01-04 NOTE — SWALLOW BEDSIDE ASSESSMENT ADULT - SLP GENERAL OBSERVATIONS
Pt awake, alert and cooperative with  present.  Facial [id number] and dried blood noted although Pt denied pain; OOB and positioned upright in chair.

## 2023-01-04 NOTE — GOALS OF CARE CONVERSATION - ADVANCED CARE PLANNING - CONVERSATION DETAILS
Patient is a 89 y/o male who was found down outside his home w/ altered mental status, hypothermia, and hypotension. He was admitted to SICU as he was intubated for airway protection and was requiring vasopressor & inotropic support for cardiogenic shock. Patient was found to have bilateral nasal bone & nasal septum fractures. He was successfully extubated on 1/2. As per the son/HCP (Gordon) and daughter-in-law/secondary HCP (Krystyna), patient moved in with them ~3 years ago as he was unable to care for himself anymore. Krystyna is his primary caretaker and she stated that in the last 6 months, while he is able to eat a whole meal, he has been coughing heavily with every bite. Given this information and after failing his bedside swallow evaluation w/ nursing, decision was made to request a bedside swallow assessment w/ SLP. Patient failed this evaluation and was recommended to be NPO. I discussed the options with Gordon & Krystyna along w/ the patient's granddaughter Lorena, who works as an occupational therapist at [employer]. I stated that the patient could either accept the risk of aspiration w/ a DNI in place and be allowed to eat or reattempt a swallow evaluation in a few days after a few more sessions of physical therapy. The family opted for reattempting a swallow evaluation in a few days. I also discussed that if the patient failed again, the options would be to either have a gastrostomy tube placed or accept the risk of aspiration w/ a DNI in place and be allowed to eat. The family then inquired if NGT placement would be an option so that he could have more time to recover. However, given his nasal bone fractures, I stated that NGT placement would not be advisable. The family also inquired if further evaluation would be beneficial, such as an MBS or FEES. I stated that while we could perform further testing, it would likely only confirm his aspiration and would not change the options of either comfort feeds vs. gastrostomy tube placement. Family wanted to discuss their options in private so I offered to readdress everything again in a few days after a repeat swallow evaluation.    I obtained a copy of the patient's living will and placed it in the chart. Additionally, I went over the MOLST form with the son Gordon, who agreed that patient should be DNR. The MOLST was signed w/ the DNR order and placed in the chart. The family wanted to defer discussions regarding intubation to after the repeat swallow assessment w/ SLP. 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 I asked if they would like to speak to palliative care so they could better understand what comfort measures and home hospice would entail if they decided to go down a comfort route and they agreed. Palliative consult has been ordered in the chart. SICU/trauma attendings made aware Patient is a 89 y/o male who was found down outside his home w/ altered mental status, hypothermia, and hypotension. He was admitted to SICU as he was intubated for airway protection and was requiring vasopressor & inotropic support for cardiogenic shock. Patient was found to have bilateral nasal bone & nasal septum fractures. He was successfully extubated on 1/2. As per the son/HCP (Gordon) and daughter-in-law/secondary HCP (Krystyna), patient moved in with them ~3 years ago as he was unable to care for himself anymore. Krystyna is his primary caretaker and she stated that in the last 6 months, while he is able to eat a whole meal, he has been coughing heavily with every bite. Given this information and after failing his bedside swallow evaluation w/ nursing, decision was made to request a bedside swallow assessment w/ SLP. Patient failed this evaluation and was recommended to be NPO. I discussed the options with Gordon & Krystyna along w/ the patient's granddaughter Lorena, who works as an occupational therapist at [employer]. I stated that the patient could either accept the risk of aspiration w/ a DNI in place and be allowed to eat or reattempt a swallow evaluation in a few days after a few more sessions of physical therapy. The family opted for reattempting a swallow evaluation in a few days. I also discussed that if the patient failed again, the options would be to either have a gastrostomy tube placed or accept the risk of aspiration w/ a DNI in place and be allowed to eat. The family then inquired if NGT placement would be an option so that he could have more time to recover. However, given his nasal bone fractures, I stated that NGT placement would not be advisable. The family also inquired if further evaluation would be beneficial, such as an MBS or FEES. I stated that while we could perform further testing, it would likely only confirm his aspiration and would not change the options of either comfort feeds vs. gastrostomy tube placement. Family wanted to discuss their options in private so I offered to readdress everything again in a few days after a repeat swallow evaluation.    I obtained a copy of the patient's living will and placed it in the chart. Additionally, I went over the MOLST form with the son Gordon, who agreed that patient should be DNR. The MOLST was signed w/ the DNR order and placed in the chart. The family wanted to defer discussions regarding intubation till after the repeat swallow assessment w/ SLP. Family believes that the patient is a "fighter" and they would like to give him a good chance but that they also understand that he is 89 years old. I asked if they would like to speak to palliative care to help them through this process as they have been hit w/ multiple difficult conversations & decisions in the last few days and they agreed as they have had good experiences in the past (Krystyna's mother was on home hospice for a year). Palliative consult has been ordered in the chart. SICU/trauma attendings made aware.

## 2023-01-04 NOTE — PROGRESS NOTE ADULT - ASSESSMENT
Patient is a 89 y/o male w/ a PMHx of congenital deafness (communicates w/ ASL), CAD s/p triple vessel CABG, s/p biventricular PPM, HTN, HLD, CKD stage IV, BPH, gout, and anemia presenting after being found down outside hypothermic, hypotensive, and altered requiring intubation AMS w/ imaging revealing bilateral nasal bone and nasal septum fractures. Patient admits to SICU for shock, ?septic, hypothermic.     Neuro: altered mental status, intubated  -MS: opens eyes to touch, moves extr  - CTH head negative  - Extubated, off sedation. 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   -Lactate is cleared    GI: no acute issues  - OGT removed with extubation  - Remains NPO  - Pending a formal swallow evaluation tomorrow, will likely need PEG placement if within family's GOC  - Protonix for stress ulcer prophylaxis    Renal: LUCY on CKD stage IV (baseline Cr 2-3)  - Plan to remove baez at 6AM for TOV  - s/p 1L fluid and 2 units of blood   - Bicarb gtt stopped    Heme:   - Monitor CBC and coags  - Started on Retacrit by Heme for suspected MDS and possible MM  - VTE ppx: Lovenox   - SCDs    ID: COVID-19 infection  - Will not give Remdesivir for now given patient's LUCY  - Currently on meropenem empirically   - Cultures sent, UA neg, one bottle G+cocci in chains and pair, received 1 dose of vanco  - repeat blood cultures: NGTD     Endo: hyperglycemia  - Monitor glucose w/ ISS q6hrs for glycemic control  - HgbA1C 5.6%    MSK: bilateral nasal bone & nasal septum fracture, facial lacerations  - Outpatient follow-up w/ plastics  - Sinus precautions  - Facial lacerations repaired w/ Nylon sutures on 12/29    GOC:  - Full code

## 2023-01-04 NOTE — PROGRESS NOTE ADULT - SUBJECTIVE AND OBJECTIVE BOX
24 HOUR EVENTS:  - S/p diuresis with Lasix 100mg in split doses  - Pending a formal swallow evaluation tomorrow, will likely need PEG placement if within family's GOC  - Plan to remove baez at 6AM for TOV  - Femoral line and central line out     SUBJECTIVE/ROS:  [ X ] A ten-point review of systems was otherwise negative except as noted.  [ ] Due to altered mental status/intubation, subjective information were not able to be obtained from the patient. History was obtained, to the extent possible, from review of the chart and collateral sources of information.      NEURO  Exam: AOx1-2. Deaf. NAD.   Meds: acetaminophen   IVPB .. 750 milliGRAM(s) IV Intermittent every 6 hours PRN Mild Pain (1 - 3)  HYDROmorphone  Injectable 0.25 milliGRAM(s) IV Push every 3 hours PRN Severe Pain (7 - 10)    [x] Adequacy of sedation and pain control has been assessed and adjusted      RESPIRATORY  RR: 23 (01-03-23 @ 23:00) (10 - 26)  SpO2: 96% (01-03-23 @ 23:00) (92% - 100%)  Wt(kg): --  Exam: CTA b/l. No murmurs, rubs, gallops appreciated.   Mechanical Ventilation:   ABG - ( 03 Jan 2023 03:35 )  pH: 7.44  /  pCO2: 38    /  pO2: 97    / HCO3: 26    / Base Excess: 1.6   /  SaO2: 98.4    Lactate: x      [ ] Extubation Readiness Assessed  Meds:       CARDIOVASCULAR  HR: 60 (01-03-23 @ 23:00) (59 - 61)  BP: 114/57 (01-03-23 @ 23:00) (83/45 - 121/58)  BP(mean): 82 (01-03-23 @ 23:00) (59 - 83)  ABP: 121/39 (01-03-23 @ 14:00) (98/34 - 129/41)  ABP(mean): 64 (01-03-23 @ 14:00) (54 - 69)  Wt(kg): --  CVP(cm H2O): --  Exam: S1S2. No murmurs, rubs, gallops appreciated.  Cardiac Rhythm: NSR rate 63  Meds:       GI/NUTRITION  Exam: Soft, non-distended, non-tender.   Diet: NPO   Meds:     GENITOURINARY  I&O's Detail    01-02 @ 07:01  -  01-03 @ 07:00  --------------------------------------------------------  IN:    Enteral Tube Flush: 70 mL    IV PiggyBack: 200 mL    Jevity 1.5: 315 mL  Total IN: 585 mL    OUT:    Indwelling Catheter - Urethral (mL): 1005 mL  Total OUT: 1005 mL    Total NET: -420 mL      01-03 @ 07:01  -  01-04 @ 00:03  --------------------------------------------------------  IN:  Total IN: 0 mL    OUT:    Indwelling Catheter - Urethral (mL): 785 mL  Total OUT: 785 mL    Total NET: -785 mL          01-03    145  |  110<H>  |  70<H>  ----------------------------<  92  3.9   |  26  |  2.69<H>    Ca    8.1<L>      03 Jan 2023 03:50  Phos  3.0     01-03  Mg     2.2     01-03      [ ] Baez catheter, indication: N/A  Meds: folic acid Injectable 1 milliGRAM(s) IV Push every 24 hours  iron sucrose Injectable 200 milliGRAM(s) IV Push every 24 hours        HEMATOLOGIC  Meds: enoxaparin Injectable 30 milliGRAM(s) SubCutaneous every 24 hours    [x] VTE Prophylaxis                        8.3    8.29  )-----------( 143      ( 03 Jan 2023 03:50 )             25.8     PT/INR - ( 03 Jan 2023 03:50 )   PT: 14.8 sec;   INR: 1.27 ratio         PTT - ( 03 Jan 2023 03:50 )  PTT:43.9 sec  Transfusion     [ ] PRBC   [ ] Platelets   [ ] FFP   [ ] Cryoprecipitate      INFECTIOUS DISEASES  T(C): 36.3 (01-03-23 @ 23:00), Max: 37 (01-03-23 @ 07:00)  Wt(kg): --  WBC Count: 8.29 K/uL (01-03 @ 03:50)    Recent Cultures:  Specimen Source: .Blood Blood-Peripheral, 12-31 @ 00:53; Results   No growth to date.; Gram Stain: --; Organism: --  Specimen Source: .Blood Blood-Peripheral, 12-31 @ 00:50; Results   No growth to date.; Gram Stain: --; Organism: --  Specimen Source: Combi-Cath Combi-Cath, 12-29 @ 16:18; Results   Normal Respiratory Dariela present; Gram Stain:   Rare polymorphonuclear leukocytes per low power field  No Squamous epithelial cells per low power field  No organisms seen; Organism: --  Specimen Source: .Blood Blood-Peripheral, 12-29 @ 07:35; Results   Growth in anaerobic bottle: Streptococcus mitis/oralis group  Alpha hemolytic strep  (not Strep. pneumoniae or Enterococcus)  Single set isolate, possible contaminant. Contact  Microbiology if susceptibility testing clinically  indicated.; Gram Stain:   Growth in anaerobic bottle: Gram Positive Cocci in Pairs and Chains; Organism: Blood Culture PCR  Specimen Source: .Blood Blood-Peripheral, 12-29 @ 07:33; Results   No Growth Final; Gram Stain: --; Organism: --    Meds: epoetin sherrie-epbx (RETACRIT) Injectable 11941 Unit(s) SubCutaneous <User Schedule>  meropenem  IVPB 500 milliGRAM(s) IV Intermittent every 12 hours        ENDOCRINE  Capillary Blood Glucose    Meds:       ACCESS DEVICES:  [ X ] Peripheral IV  [ ] Central Venous Line	[ ] R	[ ] L	[ ] IJ	[ ] Fem	[ ] SC	Placed:   [ ] Arterial Line		[ ] R	[ ] L	[ ] Fem	[ ] Rad	[ ] Ax	Placed:   [ ] PICC:					[ ] Mediport  [ X ] Urinary Catheter, Date Placed: 12/29  [ ] Necessity of urinary, arterial, and venous catheters discussed    OTHER MEDICATIONS:  artificial  tears Solution 1 Drop(s) Both EYES four times a day  bacitracin   Ointment 1 Application(s) Topical two times a day  chlorhexidine 2% Cloths 1 Application(s) Topical <User Schedule>  sodium chloride 0.65% Nasal 1 Spray(s) Both Nostrils daily      CODE STATUS: Full    IMAGING:

## 2023-01-04 NOTE — PROGRESS NOTE ADULT - ASSESSMENT
89 year old male history of CAD, s/p CABG, PPM, HTN, CKD 4, HLD, gout, upper GI bleed 2/2022, recent COVID 12/25/22, presenting as a level 1 trauma activation after being found down unresponsive in his backyard by his son. Patient arrived hypothermic to 78F, hypotensive to 60/40, responded to 110's/70's with warm IVF resuscitation, HR 70's, O2 100%, GCS 10 (4,2,4). During initial resuscitation became progressively more obtunded and was intubated for airway protection. eFAST negative CXR and PXR in trauma bay unremarkable. Pt extubated on 1/2.    PLAN:  - formal swallow eval pending  - baez removed, f/u TOV  - Appreciate excellent care per Surgical Intensive Care Unit.     Surgery [phone]

## 2023-01-04 NOTE — GOALS OF CARE CONVERSATION - ADVANCED CARE PLANNING - WHAT MATTERS MOST
Family would like the patient to be optimized as much as possible without causing too much patient discomfort so that he can go home to how he used to be prior to the fall

## 2023-01-04 NOTE — SWALLOW BEDSIDE ASSESSMENT ADULT - ASR SWALLOW RECOMMEND DIAG
SLP to f/u at a later date to determine Pt's candidacy for reevaluation of swallowing vs MBS to objectively assess pharyngeal swallow and r/o aspiration pending GOC discussion.

## 2023-01-04 NOTE — SWALLOW BEDSIDE ASSESSMENT ADULT - COMMENTS
Consult order received and chart reviewed; evaluation at b/s pending SLP to schedule visit with ASL. HX continued:   12/29/22 CT chest: IMPRESSION: No imaging evidence of acute traumatic injury involving the chest, abdomen or pelvis. Likely aspirated material within bilateral lower lobes. Pulmonary interstitial edema with probable alveolar edema versus pneumonia or aspiration. Small pleural effusions.  LUCY on CKD stage IV (baseline Cr 2-3); TOV today. Per MD: +face with multiple abrasions and dried blood diffusely, ecchymosis from nasal fracture, bilateral nasal bone and nasal septum fractures; closed laceration above left eyebrow.  Outpatient follow-up w/ plastics; - Sinus precautions; - Facial lacerations repaired w/ Nylon sutures on 12/29/22.  1/3/22 Wound care consult for sacral/bilateral buttocks and Right lower leg skin damage.    Consult order received and chart reviewed; evaluation at b/s pending SLP to schedule visit with ASL.

## 2023-01-04 NOTE — SWALLOW BEDSIDE ASSESSMENT ADULT - SWALLOW EVAL: RECOMMENDED DIET
Suggest MD f/u with family/HCP/Pt to determine the GOC with re: to provision of nutrition, hydration and meds in this patient. Based on results of this exam consider NPO, with non-oral nutrition, hydration, medications pending Pt/family/HCP wishes.

## 2023-01-04 NOTE — SWALLOW BEDSIDE ASSESSMENT ADULT - SLP PERTINENT HISTORY OF CURRENT PROBLEM
89 year old male history of  PMHx of congenital deafness (communicates w/ ASL), CAD, s/p CABG, PPM, HTN, CKD 4, HLD, gout, upper GI bleed 2/2022, recent COVID 12/25/22, presenting as a level 1 trauma activation after being found down unresponsive in his backyard by his son. Patient arrived hypothermic to 78F, hypotensive to 60/40, responded to 110's/70's with warm IVF resuscitation, HR 70's, O2 100%, GCS 10 (4,2,4). During initial resuscitation became progressively more obtunded and was intubated for airway protection. CXR  12/29/22; Pt intubated. 1/2/22  Pt extubated and OGT removed. CT head negative for any acute pathology. 89 year old male history of PMHx of congenital deafness (communicates w/ ASL), CAD, s/p CABG, PPM, HTN, CKD 4, HLD, gout, upper GI bleed 2/2022, recent COVID 12/25/22, presenting as a level 1 trauma activation after being found down unresponsive in his backyard by his son. Patient arrived hypothermic to 78F, hypotensive to 60/40, responded to 110's/70's with warm IVF resuscitation, HR 70's, O2 100%, GCS 10 (4,2,4). During initial resuscitation became progressively more obtunded and was intubated for airway protection. CXR  12/29/22; Pt intubated. 1/2/22  Pt extubated and OGT removed. CT head negative for any acute pathology.

## 2023-01-04 NOTE — PROGRESS NOTE ADULT - SUBJECTIVE AND OBJECTIVE BOX
Surgery Progress Note     24hour Events:   - S/p diuresis with Lasix 100mg in split doses  - baez removed in AM    Subjective:  Patient seen and examined.       Vital Signs:  Vital Signs Last 24 Hrs  T(C): 36.1 (04 Jan 2023 03:00), Max: 36.4 (03 Jan 2023 11:00)  T(F): 97 (04 Jan 2023 03:00), Max: 97.5 (03 Jan 2023 11:00)  HR: 64 (04 Jan 2023 06:30) (59 - 64)  BP: 117/53 (04 Jan 2023 06:30) (83/45 - 140/86)  BP(mean): 76 (04 Jan 2023 06:30) (59 - 87)  RR: 26 (04 Jan 2023 06:30) (10 - 26)  SpO2: 96% (04 Jan 2023 06:30) (92% - 100%)    Parameters below as of 03 Jan 2023 19:00  Patient On (Oxygen Delivery Method): nasal cannula  O2 Flow (L/min): 2      CAPILLARY BLOOD GLUCOSE          I&O's Detail    03 Jan 2023 07:01  -  04 Jan 2023 07:00  --------------------------------------------------------  IN:    dextrose 5%: 280 mL    IV PiggyBack: 50 mL  Total IN: 330 mL    OUT:    Indwelling Catheter - Urethral (mL): 1445 mL  Total OUT: 1445 mL    Total NET: -1115 mL            Physical Exam:  Constitutional: NAD  HEENT: face with multiple abrasions and dried blood diffusely, ecchymosis from nasal fracture, closed laceration above left eyebrow  Respiratory: breathing comfortably on RA  Abdomen: Soft, nondistended.  Extremities: LUE bruised from fingers to bicep area, area of apparent skin weeping covered with dressing. RUE w/ bruising from fingers to forearm      Labs:    01-04    148<H>  |  111<H>  |  71<H>  ----------------------------<  76  4.4   |  24  |  2.52<H>    Ca    8.7      04 Jan 2023 00:27  Phos  3.2     01-04  Mg     2.2     01-04                              8.5    8.58  )-----------( 161      ( 04 Jan 2023 00:28 )             26.9     PT/INR - ( 04 Jan 2023 00:29 )   PT: 14.3 sec;   INR: 1.23 ratio         PTT - ( 04 Jan 2023 00:29 )  PTT:43.2 sec

## 2023-01-04 NOTE — PROGRESS NOTE ADULT - SUBJECTIVE AND OBJECTIVE BOX
EMERITA SANTOS  [id number]    Patient is a 89y old  Male who presents with a chief complaint of s/p fall (01 Jan 2023 00:41)      Review of System  REVIEW OF SYSTEMS      General:	Denies fatigue, fevers, chills, sweats, decreased appetite.    Skin/Breast: denies pruritis, rash  	  Ophthalmologic: no change in vision or blurring  	  HEENT	Denies dry mouth, oral sores, dysphagia,  change in hearing.    Respiratory and Thorax:  cough, sob, wheeze, hemoptysis  	  Cardiovascular:	no cp , palp, orthopnea    Gastrointestinal:	no n/v/d constipation    Genitourinary:	no dysuria of frequency, no hematuria, no flank pain    Musculoskeletal:	no bone or joint pain. no muscle aches.     Neurological:	no change in sensory or motor function. no headache. no weakness.     Psychiatric:	no depression, no anxiety, insomnia.     Hematology/Lymphatics:	no bleeding or bruising        Current Meds  MEDICATIONS  (STANDING):  artificial  tears Solution 1 Drop(s) Both EYES four times a day  bacitracin   Ointment 1 Application(s) Topical two times a day  chlorhexidine 2% Cloths 1 Application(s) Topical <User Schedule>  dextrose 5%. 1000 milliLiter(s) (30 mL/Hr) IV Continuous <Continuous>  enoxaparin Injectable 30 milliGRAM(s) SubCutaneous every 24 hours  epoetin sherrie-epbx (RETACRIT) Injectable 36065 Unit(s) SubCutaneous <User Schedule>  folic acid Injectable 1 milliGRAM(s) IV Push every 24 hours  iron sucrose Injectable 200 milliGRAM(s) IV Push every 24 hours  meropenem  IVPB 500 milliGRAM(s) IV Intermittent every 12 hours  sodium chloride 0.65% Nasal 1 Spray(s) Both Nostrils daily    MEDICATIONS  (PRN):  acetaminophen   IVPB .. 750 milliGRAM(s) IV Intermittent every 6 hours PRN Mild Pain (1 - 3)      Vitals  Vital Signs Last 24 Hrs  T(C): 36.4 (04 Jan 2023 07:00), Max: 36.4 (03 Jan 2023 11:00)  T(F): 97.6 (04 Jan 2023 07:00), Max: 97.6 (04 Jan 2023 07:00)  HR: 60 (04 Jan 2023 08:00) (59 - 64)  BP: 97/55 (04 Jan 2023 08:00) (83/45 - 140/86)  BP(mean): 72 (04 Jan 2023 08:00) (59 - 87)  RR: 24 (04 Jan 2023 08:00) (10 - 26)  SpO2: 99% (04 Jan 2023 08:00) (92% - 100%)    Parameters below as of 04 Jan 2023 07:00  Patient On (Oxygen Delivery Method): nasal cannula  O2 Flow (L/min): 2      Physical Exam  PHYSICAL EXAM:      Constitutional: NAD    Eyes: PERRLA EOMI, anicteric sclera    Heent :No oral sores, no pharyngeal injection. moist mucosa.    Neck: supple, no jvd, no LAD    Respiratory: CTA b/l     Cardiovascular: s1s2, no m/g/r    Gastrointestinal: soft, nt, nd, + BS    Extremities: no c/c/e    Neurological:A&O x 3 moves all ext.    Skin: no rash on exposed skin    Lymph Nodes: no lymphadenopathy.              Lab  CBC Full  -  ( 04 Jan 2023 00:28 )  WBC Count : 8.58 K/uL  RBC Count : 2.73 M/uL  Hemoglobin : 8.5 g/dL  Hematocrit : 26.9 %  Platelet Count - Automated : 161 K/uL  Mean Cell Volume : 98.5 fl  Mean Cell Hemoglobin : 31.1 pg  Mean Cell Hemoglobin Concentration : 31.6 gm/dL  Auto Neutrophil # : x  Auto Lymphocyte # : x  Auto Monocyte # : x  Auto Eosinophil # : x  Auto Basophil # : x  Auto Neutrophil % : x  Auto Lymphocyte % : x  Auto Monocyte % : x  Auto Eosinophil % : x  Auto Basophil % : x    01-04    148<H>  |  111<H>  |  71<H>  ----------------------------<  76  4.4   |  24  |  2.52<H>    Ca    8.7      04 Jan 2023 00:27  Phos  3.2     01-04  Mg     2.2     01-04      PT/INR - ( 04 Jan 2023 00:29 )   PT: 14.3 sec;   INR: 1.23 ratio         PTT - ( 04 Jan 2023 00:29 )  PTT:43.2 sec    Rad:    Assessment/Plan   EMERITA SANTOS  MRN-[id number]    Patient is a 89y old  Male who presents with a chief complaint of s/p fall (01 Jan 2023 00:41)      Review of System    Sitting in chair. comfortable  no new events overnight    Current Meds  MEDICATIONS  (STANDING):  artificial  tears Solution 1 Drop(s) Both EYES four times a day  bacitracin   Ointment 1 Application(s) Topical two times a day  chlorhexidine 2% Cloths 1 Application(s) Topical <User Schedule>  dextrose 5%. 1000 milliLiter(s) (30 mL/Hr) IV Continuous <Continuous>  enoxaparin Injectable 30 milliGRAM(s) SubCutaneous every 24 hours  epoetin sherrie-epbx (RETACRIT) Injectable 08361 Unit(s) SubCutaneous <User Schedule>  folic acid Injectable 1 milliGRAM(s) IV Push every 24 hours  iron sucrose Injectable 200 milliGRAM(s) IV Push every 24 hours  meropenem  IVPB 500 milliGRAM(s) IV Intermittent every 12 hours  sodium chloride 0.65% Nasal 1 Spray(s) Both Nostrils daily    MEDICATIONS  (PRN):  acetaminophen   IVPB .. 750 milliGRAM(s) IV Intermittent every 6 hours PRN Mild Pain (1 - 3)    Vital Signs Last 24 Hrs  T(C): 36.4 (04 Jan 2023 07:00), Max: 36.4 (03 Jan 2023 11:00)  T(F): 97.6 (04 Jan 2023 07:00), Max: 97.6 (04 Jan 2023 07:00)  HR: 60 (04 Jan 2023 08:00) (59 - 64)  BP: 97/55 (04 Jan 2023 08:00) (83/45 - 140/86)  BP(mean): 72 (04 Jan 2023 08:00) (59 - 87)  RR: 24 (04 Jan 2023 08:00) (10 - 26)  SpO2: 99% (04 Jan 2023 08:00) (92% - 100%)    Parameters below as of 04 Jan 2023 07:00  Patient On (Oxygen Delivery Method): nasal cannula  O2 Flow (L/min): 2        PHYSICAL EXAM:     NAD    Lab  CBC Full  -  ( 04 Jan 2023 00:28 )  WBC Count : 8.58 K/uL  RBC Count : 2.73 M/uL  Hemoglobin : 8.5 g/dL  Hematocrit : 26.9 %  Platelet Count - Automated : 161 K/uL  Mean Cell Volume : 98.5 fl  Mean Cell Hemoglobin : 31.1 pg  Mean Cell Hemoglobin Concentration : 31.6 gm/dL  Auto Neutrophil # : x  Auto Lymphocyte # : x  Auto Monocyte # : x  Auto Eosinophil # : x  Auto Basophil # : x  Auto Neutrophil % : x  Auto Lymphocyte % : x  Auto Monocyte % : x  Auto Eosinophil % : x  Auto Basophil % : x    01-04    148<H>  |  111<H>  |  71<H>  ----------------------------<  76  4.4   |  24  |  2.52<H>    Ca    8.7      04 Jan 2023 00:27  Phos  3.2     01-04  Mg     2.2     01-04      PT/INR - ( 04 Jan 2023 00:29 )   PT: 14.3 sec;   INR: 1.23 ratio         PTT - ( 04 Jan 2023 00:29 )  PTT:43.2 sec    Rad:    Assessment/Plan

## 2023-01-04 NOTE — SWALLOW BEDSIDE ASSESSMENT ADULT - SWALLOW EVAL: CURRENT DIET
NPO NPO; Pt currently NPO; - Unable to place OGT; 12/31/22 Pt had significant edema of the soft palate.

## 2023-01-04 NOTE — SWALLOW BEDSIDE ASSESSMENT ADULT - SWALLOW EVAL: DIAGNOSIS
Pt is a 89 y/o male, Level 1 trauma s/p respiratory distress and now extubated, Covid+, who presents with oropharyngeal dysphagia characterized by coughing post swallow on most conservative textures which is considered to be s/s of laryngeal penetration or aspiration.  In addition, multiple swallows may be indicative of pharyngeal residue.  Pt required assistance with self feeding during cup trials.  Pt engaged in ASL to express simple needs and thoughts. Pt is a 89 y/o male, Level 1 trauma s/p respiratory distress and now extubated, Covid+, who presents with oropharyngeal dysphagia characterized by coughing post swallow on most conservative textures which is considered to be s/s of laryngeal penetration or aspiration.  In addition, multiple swallows may be indicative of pharyngeal residue.  Pt required assistance with self feeding during cup trials.  Formation, control and transfer of bolus were also moderately impaired. Pt engaged in ASL to express simple needs and thoughts.

## 2023-01-05 NOTE — CONSULT NOTE ADULT - SUBJECTIVE AND OBJECTIVE BOX
HPI:  HPI:   89 year old male history of CAD, s/p CABG, PPM, HTN, CKD 4, HLD, gout, upper GI bleed 2/2022, recent COVID 12/25/22, presenting as a level 1 trauma activation after being found down on the ground in his backyard by his son. Son states he found his father next to his walker unresponsive. Patient arrived hypothermic to 78F, hypotensive to 60/40, responded to 110's/70's with warm IVF resuscitation, HR 70's, O2 100%, GCS 10 (4,2,4)    Primary Survey:   A - airway intact  B - bilateral breath sounds and good chest rise  C - initial BP  BP:  60/40 (12-29-22 @ 08:18), HR HR: 79 (12-29-22 @ 08:49) , palpable pulses in all extremities  D - GCS 10 on arrival  Exposure obtained      Secondary Survey:   General: Altered, elderly male  HEENT: Dry blood throughout scalp and face, 3cm full-thickness laceration above left eyebrow, L eye periorbital ecchymosis, L nare laceration, dry blood in both nares and on tongue, no active bleeding anywhere, EOMI, PEERL.  Neck: Soft, midline trachea.  Chest: No chest wall deformities.   Cardiac: S1, S2, RRR  Respiratory: Bilateral breath sounds, clear and equal bilaterally  Abdomen: Soft, non-distended, no masses palpated  Groin: Normal appearing  Ext: palp brachial b/l UE, b/l femoral palp, cannot assess motor and sensory function due to AMS  Back: no palpable stepoff/deformity  Rectal: No fe blood    eFAST negative  CXR and PXR in trauma bay unremarkable    ROS: 10-system review could not be obtained due to patient condition    PAST MEDICAL & SURGICAL HISTORY:  S/P CABG x 3      CKD (chronic kidney disease)      Anemia      Gout      BPH (benign prostatic hyperplasia)      Hypertension      Hypercholesterolemia      CAD (coronary artery disease)      Pacemaker      History of esophagogastroduodenoscopy (EGD)      Artificial cardiac pacemaker      S/P CABG x 1        FAMILY HISTORY:  No pertinent family history in first degree relatives      [] Family history not pertinent as reviewed with the patient and family    CURRENT MEDICATIONS  MEDICATIONS (STANDING): acetaminophen   IVPB .. 750 milliGRAM(s) IV Intermittent every 6 hours  insulin lispro (ADMELOG) corrective regimen sliding scale   SubCutaneous every 6 hours  pantoprazole  Injectable 40 milliGRAM(s) IV Push daily  sodium chloride 0.9%. 1000 milliLiter(s) IV Continuous <Continuous>    MEDICATIONS (PRN):  --------------------------------------------------------------------------------------------    Vitals:   T(C): 27.8 (12-29-22 @ 08:05), Max: 27.8 (12-29-22 @ 08:05)  HR: 60 (12-29-22 @ 08:49) (60 - 67)  BP: 123/53 (12-29-22 @ 08:18) (90/51 - 132/58)  RR: 24 (12-29-22 @ 08:40) (15 - 24)  SpO2: 100% (12-29-22 @ 08:49) (85% - 100%)  CAPILLARY BLOOD GLUCOSE      POCT Blood Glucose.: 182 mg/dL (29 Dec 2022 07:30)    CAPILLARY BLOOD GLUCOSE      POCT Blood Glucose.: 182 mg/dL (29 Dec 2022 07:30)          --------------------------------------------------------------------------------------------    LABS  CBC (12-29 @ 07:39)                              8.9<L>                         10.81<H>  )----------------(  149<L>     87.6<H>% Neutrophils, 6.0<L>% Lymphocytes, ANC: 9.47<H>                              28.8<L>    BMP (12-29 @ 07:39)             137     |  100     |  85<H> 		Ca++ --      Ca 7.9<L>             ---------------------------------( 205<H>		Mg --                 5.0     |  23      |  3.37<H>			Ph --        LFTs (12-29 @ 07:39)      TPro 5.3<L> / Alb 3.0<L> / TBili 1.1 / DBili -- / AST 26 / ALT 15 / AlkPhos 78        ABG (12-29 @ 09:04)     7.31<L> / 37 / 355<H> / 19<L> / -7.0<L> / 99.1<H>%     Lactate:    ABG (12-29 @ 07:54)     7.26<L> / 38 / 109<H> / 17<L> / -9.2<L> / 97.3%     Lactate:   (29 Dec 2022 08:43)    PERTINENT PM/SXH:   S/P CABG x 3    CKD (chronic kidney disease)    Anemia    Gout    BPH (benign prostatic hyperplasia)    Hypertension    Hypercholesterolemia    CAD (coronary artery disease)      Pacemaker    History of esophagogastroduodenoscopy (EGD)    Artificial cardiac pacemaker    S/P CABG x 1      FAMILY HISTORY:  No pertinent family history in first degree relatives      Family Hx substance abuse [ ]yes [ ]no  ITEMS NOT CHECKED ARE NOT PRESENT    SOCIAL HISTORY:   Significant other/partner[ ]  Children[x ]  Anabaptist/Spirituality:  Substance hx:  [ ]   Tobacco hx:  [ ]   Alcohol hx: [ ]   Home Opioid hx:  [ ] I-Stop Reference No:  Living Situation: [ ]Home  [ ]Long term care  [ ]Rehab [ ]Other    ADVANCE DIRECTIVES:    DNR/MOLST  [ ]  Living Will  [ ]   DECISION MAKER(s):  [ ] Health Care Proxy(s)  [ ] Surrogate(s)  [ ] Guardian           Name(s): Phone Number(s):    BASELINE (I)ADL(s) (prior to admission):  [place]: [ ]Total  [ ] Moderate [ ]Dependent    Allergies    No Known Allergies    Intolerances    MEDICATIONS  (STANDING):  artificial  tears Solution 1 Drop(s) Both EYES four times a day  bacitracin   Ointment 1 Application(s) Topical two times a day  chlorhexidine 2% Cloths 1 Application(s) Topical <User Schedule>  dextrose 5%. 1000 milliLiter(s) (30 mL/Hr) IV Continuous <Continuous>  enoxaparin Injectable 30 milliGRAM(s) SubCutaneous every 24 hours  epoetin sherrie-epbx (RETACRIT) Injectable 47344 Unit(s) SubCutaneous <User Schedule>  folic acid Injectable 1 milliGRAM(s) IV Push every 24 hours  sodium chloride 0.65% Nasal 1 Spray(s) Both Nostrils daily    MEDICATIONS  (PRN):  acetaminophen   IVPB .. 750 milliGRAM(s) IV Intermittent every 6 hours PRN Mild Pain (1 - 3)    PRESENT SYMPTOMS: [ ]Unable to self-report  [ ] CPOT [ ] PAINADs [ ] RDOS  Source if other than patient:  [ ]Family   [ ]Team     Pain: [ ]yes [x ]no  QOL impact -   Location -                    Aggravating factors -  Quality -  Radiation -  Timing-  Severity (0-10 scale):  Minimal acceptable level (0-10 scale):     CPOT:    https://www.[facility].org/getattachment/[email or web address]/Critical-Care-Pain-Observation-Tool-(CPOT)    PAIN AD Score:   http://geriatrictoolkit.[facility]/cog/painad.pdf (press ctrl +  left click to view)    Dyspnea:                           [ ]Mild [ ]Moderate [ ]Severe      RDOS:  0 to 2  minimal or no respiratory distress   3  mild distress  4 to 6 moderate distress  >7 severe distress  https://homecareinformation.net/handouts/hen/Respiratory_Distress_Observation_Scale.pdf (Ctrl +  left click to view)     Anxiety:                             [ ]Mild [ ]Moderate [ ]Severe  Fatigue:                             [ ]Mild [ ]Moderate [ ]Severe  Nausea:                             [ ]Mild [ ]Moderate [ ]Severe  Loss of appetite:              [ ]Mild [ ]Moderate [ ]Severe  Constipation:                    [ ]Mild [ ]Moderate [ ]Severe    PCSSQ[Palliative Care Spiritual Screening Question]   Severity (0-10):  Score of 4 or > indicate consideration of Chaplaincy referral.  Chaplaincy Referral: [ ] yes [x ] refused [ ] following [  ] Deferred     Caregiver [place]? : [x ] yes [ ] no [ ] Deferred [ ] Declined             Social work referral [x ] Patient & Family Centered Care Referral [ ]     Anticipatory Grief present?:  [ ] yes [x ] no  [ ] Deferred                  Social work referral [ ] Chaplaincy Referral[ ]      Other Symptoms:  [ x]All other review of systems negative     Palliative Performance Status Version 2:     40    %    http://npcrc.org/files/news/palliative_performance_scale_ppsv2.pdf  PHYSICAL EXAM:  Vital Signs Last 24 Hrs  T(C): 36.7 (05 Jan 2023 15:00), Max: 36.8 (05 Jan 2023 11:00)  T(F): 98.1 (05 Jan 2023 15:00), Max: 98.2 (05 Jan 2023 11:00)  HR: 89 (05 Jan 2023 15:00) (59 - 89)  BP: 143/63 (05 Jan 2023 15:00) (92/54 - 143/63)  BP(mean): 91 (05 Jan 2023 15:00) (68 - 94)  RR: 22 (05 Jan 2023 15:00) (16 - 34)  SpO2: 100% (05 Jan 2023 15:00) (93% - 100%)    Parameters below as of 05 Jan 2023 07:00  Patient On (Oxygen Delivery Method): nasal cannula  O2 Flow (L/min): 2   I&O's Summary    04 Jan 2023 07:01  -  05 Jan 2023 07:00  --------------------------------------------------------  IN: 720 mL / OUT: 350 mL / NET: 370 mL    05 Jan 2023 07:01  -  05 Jan 2023 15:56  --------------------------------------------------------  IN: 240 mL / OUT: 350 mL / NET: -110 mL      GENERAL: [ ]Cachexia    [x ]Alert  [ ]Oriented x   [ ]Lethargic  [ ]Unarousable  [ ]Verbal  [ ]Non-Verbal  Behavioral:   [ ] Anxiety  [ ] Delirium [ ] Agitation x[x] Otherx Calm  HEENT: facial fractures  [ ]Normal   [ ]Dry mouth   [ ]ET Tube/Trach  [ ]Oral lesions  PULMONARY:   [ x]Clear [ ]Tachypnea  [ ]Audible excessive secretions   [ ]Rhonchi        [ ]Right [ ]Left [ ]Bilateral  [ ]Crackles        [ ]Right [ ]Left [ ]Bilateral  [ ]Wheezing     [ ]Right [ ]Left [ ]Bilateral  [ ]Diminished breath sounds [ ]right [ ]left [ ]bilateral  CARDIOVASCULAR:    [x ]Regular [ ]Irregular [ ]Tachy  [ ]Marco [ ]Murmur [ ]Other  GASTROINTESTINAL:  [x ]Soft  [ ]Distended   [ ]+BS  [ ]Non tender [ ]Tender  [ ]Other [ ]PEG [ ]OGT/ NGT  Last BM:  GENITOURINARY:  [ x]Normal [ ] Incontinent   [ ]Oliguria/Anuria   [ ]Davis  MUSCULOSKELETAL:   [ ]Normal   [x ]Weakness  [ ]Bed/Wheelchair bound [ ]Edema  NEUROLOGIC:   [x ]No focal deficits  [ ]Cognitive impairment  [ ]Dysphagia [ ]Dysarthria [ ]Paresis [ ]Other   SKIN:   [x ]Normal  [ ]Rash  [ ]Other  [ ]Pressure ulcer(s)       Present on admission [ ]y [ ]n    CRITICAL CARE:  [ ] Shock Present  [ ]Septic [ ]Cardiogenic [ ]Neurologic [ ]Hypovolemic  [ ]  Vasopressors [ ]  Inotropes   [ ]Respiratory failure present [ ]Mechanical ventilation [ ]Non-invasive ventilatory support [ ]High flow    [ ]Acute  [ ]Chronic [ ]Hypoxic  [ ]Hypercarbic [ ]Other  [ ]Other organ failure     LABS:                        7.9    10.06 )-----------( 183      ( 05 Jan 2023 00:46 )             25.4   01-05    143  |  106  |  71<H>  ----------------------------<  165<H>  4.1   |  24  |  2.39<H>    Ca    8.8      05 Jan 2023 12:40  Phos  3.3     01-05  Mg     2.2     01-05    PT/INR - ( 05 Jan 2023 00:46 )   PT: 14.5 sec;   INR: 1.26 ratio         PTT - ( 05 Jan 2023 00:46 )  PTT:49.2 sec      RADIOLOGY & ADDITIONAL STUDIES:    PROTEIN CALORIE MALNUTRITION PRESENT: [ ]mild [ ]moderate [ ]severe [ ]underweight [ ]morbid obesity  https://www.andeal.org/vault/2440/web/files/ONC/Table_Clinical%20Characteristics%20to%20Document%20Malnutrition-White%20JV%20et%20al%202012.pdf    Height (cm): 167.6 (12-29-22 @ 17:00)  Weight (kg): 60 (12-29-22 @ 09:39)  BMI (kg/m2): 21.4 (12-29-22 @ 17:00)    [ ]PPSV2 < or = to 30% [ ]significant weight loss  [ ]poor nutritional intake  [ ]anasarca[ ]Artificial Nutrition      Other REFERRALS:  [ ]Hospice  [ ]Child Life  [ ]Social Work  [ ]Case management [ ]Holistic Therapy

## 2023-01-05 NOTE — CONSULT NOTE ADULT - PROBLEM SELECTOR RECOMMENDATION 4
Current functional PPSv2: 50  Nursing care required:         Family Health Care Decision Act (FHCDA) Surrogate Decision Maker Hierarchy in the absence of a health care agent  [facility] Article 81 Guardian-->Spouse or domestic partner--> Adult child-->Parent-->Sibling--> Close friend

## 2023-01-05 NOTE — PROGRESS NOTE ADULT - SUBJECTIVE AND OBJECTIVE BOX
Trauma Surgery Progress Note  Patient is a 89y old  Male who presents with a chief complaint of s/p fall (01 Jan 2023 00:41)      INTERVAL EVENTS:  -Failed s/s  -DNR    SUBJECTIVE: Patient seen and examined at bedside with surgical team, patient being moved from bed to chair with Kirby lift.    OBJECTIVE:    Vital Signs Last 24 Hrs  T(C): 36.6 (05 Jan 2023 07:00), Max: 36.6 (04 Jan 2023 11:00)  T(F): 97.8 (05 Jan 2023 07:00), Max: 97.9 (04 Jan 2023 11:00)  HR: 60 (05 Jan 2023 08:00) (59 - 96)  BP: 101/49 (05 Jan 2023 08:00) (92/54 - 147/71)  BP(mean): 71 (05 Jan 2023 08:00) (68 - 94)  RR: 23 (05 Jan 2023 08:00) (12 - 29)  SpO2: 95% (05 Jan 2023 08:00) (92% - 100%)    Parameters below as of 05 Jan 2023 07:00  Patient On (Oxygen Delivery Method): nasal cannula  O2 Flow (L/min): 2  I&O's Detail    04 Jan 2023 07:01  -  05 Jan 2023 07:00  --------------------------------------------------------  IN:    dextrose 5%: 720 mL  Total IN: 720 mL    OUT:    Intermittent Catheterization - Urethral (mL): 350 mL  Total OUT: 350 mL    Total NET: 370 mL      05 Jan 2023 07:01  -  05 Jan 2023 08:53  --------------------------------------------------------  IN:    dextrose 5%: 30 mL  Total IN: 30 mL    OUT:  Total OUT: 0 mL    Total NET: 30 mL      MEDICATIONS  (STANDING):  artificial  tears Solution 1 Drop(s) Both EYES four times a day  bacitracin   Ointment 1 Application(s) Topical two times a day  chlorhexidine 2% Cloths 1 Application(s) Topical <User Schedule>  dextrose 5%. 1000 milliLiter(s) (30 mL/Hr) IV Continuous <Continuous>  enoxaparin Injectable 30 milliGRAM(s) SubCutaneous every 24 hours  epoetin sherrie-epbx (RETACRIT) Injectable 46976 Unit(s) SubCutaneous <User Schedule>  folic acid Injectable 1 milliGRAM(s) IV Push every 24 hours  HYDROmorphone  Injectable 0.25 milliGRAM(s) IV Push once  sodium chloride 0.65% Nasal 1 Spray(s) Both Nostrils daily    MEDICATIONS  (PRN):  acetaminophen   IVPB .. 750 milliGRAM(s) IV Intermittent every 6 hours PRN Mild Pain (1 - 3)      PHYSICAL EXAM:  Constitutional: NAD  HEENT: face with multiple abrasions, ecchymosis from nasal fracture, closed laceration above left eyebrow  Respiratory: breathing comfortably  Abdomen: Soft, nondistended.  Extremities: LUE bruised from fingers to bicep area, area of apparent skin weeping covered with dressing. RUE w/ bruising from fingers to forearm    LABS:                        7.9    10.06 )-----------( 183      ( 05 Jan 2023 00:46 )             25.4     01-05    138  |  101  |  64<H>  ----------------------------<  366<H>  4.0   |  26  |  2.34<H>    Ca    8.0<L>      05 Jan 2023 00:46  Phos  3.1     01-05  Mg     2.2     01-05      PT/INR - ( 05 Jan 2023 00:46 )   PT: 14.5 sec;   INR: 1.26 ratio         PTT - ( 05 Jan 2023 00:46 )  PTT:49.2 sec          IMAGING:

## 2023-01-05 NOTE — PROGRESS NOTE ADULT - SUBJECTIVE AND OBJECTIVE BOX
24 HOUR EVENTS:  -Failed s/s  -DNR    SUBJECTIVE/ROS:  [ ] A ten-point review of systems was otherwise negative except as noted.  [ ] Due to altered mental status/intubation, subjective information were not able to be obtained from the patient. History was obtained, to the extent possible, from review of the chart and collateral sources of information.      NEURO  RASS:   0  GCS:   15  CAM ICU:  Exam: awake, alert, oriented  Meds: acetaminophen   IVPB .. 750 milliGRAM(s) IV Intermittent every 6 hours PRN Mild Pain (1 - 3)  HYDROmorphone  Injectable 0.25 milliGRAM(s) IV Push once    [x] Adequacy of sedation and pain control has been assessed and adjusted      RESPIRATORY  RR: 20 (01-04-23 @ 23:00) (12 - 29)  SpO2: 97% (01-04-23 @ 23:00) (92% - 100%)  Wt(kg): --  Exam: unlabored, clear to auscultation bilaterally, RA  Mechanical Ventilation:   ABG - ( 04 Jan 2023 00:05 )  pH: 7.45  /  pCO2: 39    /  pO2: 81    / HCO3: 27    / Base Excess: 2.9   /  SaO2: 97.5    Lactate: x                [N/A] Extubation Readiness Assessed  Meds:       CARDIOVASCULAR  HR: 62 (01-04-23 @ 23:00) (59 - 96)  BP: 125/76 (01-04-23 @ 23:00) (87/44 - 147/71)  BP(mean): 94 (01-04-23 @ 23:00) (62 - 94)  ABP: --  ABP(mean): --  Wt(kg): --  CVP(cm H2O): --      Exam: regular rate and rhythm  Cardiac Rhythm: sinus  Perfusion     [x]Adequate   [ ]Inadequate  Mentation   [x]Normal       [ ]Reduced  Extremities  [x]Warm         [ ]Cool  Volume Status [ ]Hypervolemic [x]Euvolemic [ ]Hypovolemic  Meds:       GI/NUTRITION  Exam: soft, nontender, nondistended   Diet: NPO  Meds:     GENITOURINARY  I&O's Detail    01-03 @ 07:01  -  01-04 @ 07:00  --------------------------------------------------------  IN:    dextrose 5%: 280 mL    IV PiggyBack: 50 mL  Total IN: 330 mL    OUT:    Indwelling Catheter - Urethral (mL): 1445 mL  Total OUT: 1445 mL    Total NET: -1115 mL      01-04 @ 07:01  -  01-05 @ 00:06  --------------------------------------------------------  IN:    dextrose 5%: 510 mL  Total IN: 510 mL    OUT:    Intermittent Catheterization - Urethral (mL): 350 mL  Total OUT: 350 mL    Total NET: 160 mL          01-04    147<H>  |  109<H>  |  70<H>  ----------------------------<  86  4.6   |  25  |  2.46<H>    Ca    8.7      04 Jan 2023 13:46  Phos  3.2     01-04  Mg     2.2     01-04      [ ] Davis catheter, indication: N/A  Meds: dextrose 5%. 1000 milliLiter(s) IV Continuous <Continuous>  folic acid Injectable 1 milliGRAM(s) IV Push every 24 hours  iron sucrose Injectable 200 milliGRAM(s) IV Push every 24 hours        HEMATOLOGIC  Meds: enoxaparin Injectable 30 milliGRAM(s) SubCutaneous every 24 hours    [x] VTE Prophylaxis                        8.5    8.58  )-----------( 161      ( 04 Jan 2023 00:28 )             26.9     PT/INR - ( 04 Jan 2023 00:29 )   PT: 14.3 sec;   INR: 1.23 ratio         PTT - ( 04 Jan 2023 00:29 )  PTT:43.2 sec  Transfusion     [ ] PRBC   [ ] Platelets   [ ] FFP   [ ] Cryoprecipitate      INFECTIOUS DISEASES  WBC Count: 8.58 K/uL (01-04 @ 00:28)    RECENT CULTURES:  Specimen Source: .Blood Blood-Peripheral  Date/Time: 12-31 @ 00:53  Culture Results:   No growth to date.  Gram Stain: --  Organism: --  Specimen Source: .Blood Blood-Peripheral  Date/Time: 12-31 @ 00:50  Culture Results:   No growth to date.  Gram Stain: --  Organism: --    Meds: epoetin sherrie-epbx (RETACRIT) Injectable [id number] Unit(s) SubCutaneous <User Schedule>        ENDOCRINE  CAPILLARY BLOOD GLUCOSE        Meds:       ACCESS DEVICES:  [ ] Peripheral IV  [ ] Central Venous Line	[ ] R	[ ] L	[ ] IJ	[ ] Fem	[ ] SC	Placed:   [ ] Arterial Line		[ ] R	[ ] L	[ ] Fem	[ ] Rad	[ ] Ax	Placed:   [ ] PICC:					[ ] Mediport  [ ] Urinary Catheter, Date Placed:   [x] Necessity of urinary, arterial, and venous catheters discussed    OTHER MEDICATIONS:  artificial  tears Solution 1 Drop(s) Both EYES four times a day  bacitracin   Ointment 1 Application(s) Topical two times a day  chlorhexidine 2% Cloths 1 Application(s) Topical <User Schedule>  sodium chloride 0.65% Nasal 1 Spray(s) Both Nostrils daily      CODE STATUS:  Yes        IMAGING:

## 2023-01-05 NOTE — CONSULT NOTE ADULT - CONVERSATION DETAILS
An extensive conversation was held.  I provided a contemporary and accurate explanation of palliative medicine.  We discussed the role of palliative medicine in enhancing the quality of life for patients living with serious medical illness, the role of the interdisciplinary team in addressing symptoms, coping, and healthcare navigation, and the impact of palliative care along the illness trajectory.  Case reviewed in detail. Plan is for NGT and trail of ELVIS until the repeat speech and swallow exam.  Globally, they would like to continue current therapy. They do recognize the opportunity for worsening but remain hopeful in light of his long standing functionality. However, they would like to reassess frequently and have maximal information moving forward. They are pleased with communication thus far but want to explore all possibilities: home with DME, rehab, hospice to have the largest pool of information before making a decision.     Discussed caregiver burden and IDT needs    ACP Time > 46 min

## 2023-01-05 NOTE — PROGRESS NOTE ADULT - ASSESSMENT
Patient is a 89 y/o male w/ a PMHx of congenital deafness (communicates w/ ASL), CAD s/p triple vessel CABG, s/p biventricular PPM, HTN, HLD, CKD stage IV, BPH, gout, and anemia present as Level 1 Trauma after being found down outside for unknown length of time, intubated in Trauma bay d/t AMS. Admitted to SICU for ventilatory support. Initially requiring vasopressor and inotropic support, now weaned. Extubated 1/2.     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

## 2023-01-05 NOTE — PROGRESS NOTE ADULT - ATTENDING COMMENTS
fall , hypothermic and shock, now resolved, but poor mental status    ON: no major events    alert and awake and communicates with sign eliu (pt is deaf), sitting in chair, has eye glasses on sitting in chair  IV tylenol, dc dilaudid since no need  on 2 lit , wean as tolerated, encourage IS  AM CXR largely unchanged  stable hemodynamics  hold home metop  failed swallow, discussed w family , if facial trauma agrees, place nasoenteric tube and start feedings and boost  no BMs  voiding, unmeasured  downtrending cr 2.3  resolved hypernatremia  D5 @ 30  cont lasix 40 mg home med  EPO per heme recommendations every 2 wks  lovenox PPX  COVID pos  afebrile  gpc bcx initally, thought to be contaminant  procal 1.16->0.8  s/p meropenem  mild hypoglycemia, on D5   PT OT working with him, SW for placement  removal of facial sutures on Sunday  family at bedside, updated them  pt has a living will    PIVs

## 2023-01-05 NOTE — PROGRESS NOTE ADULT - ASSESSMENT
89 year old male history of CAD, s/p CABG, PPM, HTN, CKD 4, HLD, gout, upper GI bleed 2/2022, recent COVID 12/25/22, presenting as a level 1 trauma activation after being found down unresponsive in his backyard by his son. Patient arrived hypothermic to 78F, hypotensive to 60/40, responded to 110's/70's with warm IVF resuscitation, HR 70's, O2 100%, GCS 10 (4,2,4). During initial resuscitation became progressively more obtunded and was intubated for airway protection. eFAST negative CXR and PXR in trauma bay unremarkable. Pt extubated on 1/2.    PLAN:  - GOC with son to determine if PEG is an option  - Appreciate excellent care per Surgical Intensive Care Unit.     Surgery [phone]

## 2023-01-05 NOTE — PROGRESS NOTE ADULT - SUBJECTIVE AND OBJECTIVE BOX
EMERITA SANTOS  [id number]    Patient is a 89y old  Male who presents with a chief complaint of s/p fall (01 Jan 2023 00:41)      Review of System  REVIEW OF SYSTEMS      General:	Denies fatigue, fevers, chills, sweats, decreased appetite.    Skin/Breast: denies pruritis, rash  	  Ophthalmologic: no change in vision or blurring  	  HEENT	Denies dry mouth, oral sores, dysphagia,  change in hearing.    Respiratory and Thorax:  cough, sob, wheeze, hemoptysis  	  Cardiovascular:	no cp , palp, orthopnea    Gastrointestinal:	no n/v/d constipation    Genitourinary:	no dysuria of frequency, no hematuria, no flank pain    Musculoskeletal:	no bone or joint pain. no muscle aches.     Neurological:	no change in sensory or motor function. no headache. no weakness.     Psychiatric:	no depression, no anxiety, insomnia.     Hematology/Lymphatics:	no bleeding or bruising        Current Meds  MEDICATIONS  (STANDING):  artificial  tears Solution 1 Drop(s) Both EYES four times a day  bacitracin   Ointment 1 Application(s) Topical two times a day  chlorhexidine 2% Cloths 1 Application(s) Topical <User Schedule>  dextrose 5%. 1000 milliLiter(s) (30 mL/Hr) IV Continuous <Continuous>  enoxaparin Injectable 30 milliGRAM(s) SubCutaneous every 24 hours  epoetin sherrie-epbx (RETACRIT) Injectable 30755 Unit(s) SubCutaneous <User Schedule>  folic acid Injectable 1 milliGRAM(s) IV Push every 24 hours  HYDROmorphone  Injectable 0.25 milliGRAM(s) IV Push once  iron sucrose Injectable 200 milliGRAM(s) IV Push every 24 hours  sodium chloride 0.65% Nasal 1 Spray(s) Both Nostrils daily    MEDICATIONS  (PRN):  acetaminophen   IVPB .. 750 milliGRAM(s) IV Intermittent every 6 hours PRN Mild Pain (1 - 3)      Vitals  Vital Signs Last 24 Hrs  T(C): 36.5 (05 Jan 2023 03:00), Max: 36.6 (04 Jan 2023 11:00)  T(F): 97.7 (05 Jan 2023 03:00), Max: 97.9 (04 Jan 2023 11:00)  HR: 60 (05 Jan 2023 06:00) (59 - 96)  BP: 92/54 (05 Jan 2023 06:00) (92/54 - 147/71)  BP(mean): 68 (05 Jan 2023 06:00) (68 - 94)  RR: 24 (05 Jan 2023 06:00) (12 - 29)  SpO2: 94% (05 Jan 2023 06:00) (92% - 100%)    Parameters below as of 04 Jan 2023 19:00  Patient On (Oxygen Delivery Method): nasal cannula  O2 Flow (L/min): 2      Physical Exam  PHYSICAL EXAM:      Constitutional: NAD    Eyes: PERRLA EOMI, anicteric sclera    Heent :No oral sores, no pharyngeal injection. moist mucosa.    Neck: supple, no jvd, no LAD    Respiratory: CTA b/l     Cardiovascular: s1s2, no m/g/r    Gastrointestinal: soft, nt, nd, + BS    Extremities: no c/c/e    Neurological:A&O x 3 moves all ext.    Skin: no rash on exposed skin    Lymph Nodes: no lymphadenopathy.              Lab  CBC Full  -  ( 05 Jan 2023 00:46 )  WBC Count : 10.06 K/uL  RBC Count : 2.55 M/uL  Hemoglobin : 7.9 g/dL  Hematocrit : 25.4 %  Platelet Count - Automated : 183 K/uL  Mean Cell Volume : 99.6 fl  Mean Cell Hemoglobin : 31.0 pg  Mean Cell Hemoglobin Concentration : 31.1 gm/dL  Auto Neutrophil # : x  Auto Lymphocyte # : x  Auto Monocyte # : x  Auto Eosinophil # : x  Auto Basophil # : x  Auto Neutrophil % : x  Auto Lymphocyte % : x  Auto Monocyte % : x  Auto Eosinophil % : x  Auto Basophil % : x    01-05    138  |  101  |  64<H>  ----------------------------<  366<H>  4.0   |  26  |  2.34<H>    Ca    8.0<L>      05 Jan 2023 00:46  Phos  3.1     01-05  Mg     2.2     01-05      PT/INR - ( 05 Jan 2023 00:46 )   PT: 14.5 sec;   INR: 1.26 ratio         PTT - ( 05 Jan 2023 00:46 )  PTT:49.2 sec    Rad:    Assessment/Plan   EMERITA SANTOS  [id number]    Patient is a 89y old  Male who presents with a chief complaint of s/p fall (01 Jan 2023 00:41)    Review of System    Sitting in chair in ICU.  Comfortable.  Lethargic.  Not participating in ROS.  No new events overnight as per rn.    Current Meds  MEDICATIONS  (STANDING):  artificial  tears Solution 1 Drop(s) Both EYES four times a day  bacitracin   Ointment 1 Application(s) Topical two times a day  chlorhexidine 2% Cloths 1 Application(s) Topical <User Schedule>  dextrose 5%. 1000 milliLiter(s) (30 mL/Hr) IV Continuous <Continuous>  enoxaparin Injectable 30 milliGRAM(s) SubCutaneous every 24 hours  epoetin sherrie-epbx (RETACRIT) Injectable 16321 Unit(s) SubCutaneous <User Schedule>  folic acid Injectable 1 milliGRAM(s) IV Push every 24 hours  HYDROmorphone  Injectable 0.25 milliGRAM(s) IV Push once  iron sucrose Injectable 200 milliGRAM(s) IV Push every 24 hours  sodium chloride 0.65% Nasal 1 Spray(s) Both Nostrils daily    MEDICATIONS  (PRN):  acetaminophen   IVPB .. 750 milliGRAM(s) IV Intermittent every 6 hours PRN Mild Pain (1 - 3)      Vital Signs Last 24 Hrs  T(C): 36.5 (05 Jan 2023 03:00), Max: 36.6 (04 Jan 2023 11:00)  T(F): 97.7 (05 Jan 2023 03:00), Max: 97.9 (04 Jan 2023 11:00)  HR: 60 (05 Jan 2023 06:00) (59 - 96)  BP: 92/54 (05 Jan 2023 06:00) (92/54 - 147/71)  BP(mean): 68 (05 Jan 2023 06:00) (68 - 94)  RR: 24 (05 Jan 2023 06:00) (12 - 29)  SpO2: 94% (05 Jan 2023 06:00) (92% - 100%)    Parameters below as of 04 Jan 2023 19:00  Patient On (Oxygen Delivery Method): nasal cannula  O2 Flow (L/min): 2    Physical Exam    Constitutional: NAD    Eyes: PERRLA EOMI, anicteric sclera    Heent :No oral sores, no pharyngeal injection. moist mucosa.    Neck: supple, no jvd, no LAD    Respiratory: CTA b/l     Cardiovascular: s1s2, no m/g/r    Gastrointestinal: soft, nt, nd, + BS    Extremities: no c/c/e    Neurological:A&O x 3 moves all ext.    Skin: + ecchymosis over face    Lymph Nodes: no lymphadenopathy.      Lab  CBC Full  -  ( 05 Jan 2023 00:46 )  WBC Count : 10.06 K/uL  RBC Count : 2.55 M/uL  Hemoglobin : 7.9 g/dL  Hematocrit : 25.4 %  Platelet Count - Automated : 183 K/uL  Mean Cell Volume : 99.6 fl  Mean Cell Hemoglobin : 31.0 pg  Mean Cell Hemoglobin Concentration : 31.1 gm/dL  Auto Neutrophil # : x  Auto Lymphocyte # : x  Auto Monocyte # : x  Auto Eosinophil # : x  Auto Basophil # : x  Auto Neutrophil % : x  Auto Lymphocyte % : x  Auto Monocyte % : x  Auto Eosinophil % : x  Auto Basophil % : x    01-05    138  |  101  |  64<H>  ----------------------------<  366<H>  4.0   |  26  |  2.34<H>    Ca    8.0<L>      05 Jan 2023 00:46  Phos  3.1     01-05  Mg     2.2     01-05      PT/INR - ( 05 Jan 2023 00:46 )   PT: 14.5 sec;   INR: 1.26 ratio         PTT - ( 05 Jan 2023 00:46 )  PTT:49.2 sec    Rad:    Assessment/Plan

## 2023-01-05 NOTE — CONSULT NOTE ADULT - CONSULT REASON
anemia
sacral bilateral buttocks skin damage
Hemodynamic Monitoring
consulted for assistance with complex medical decision making in the context of a patient with dysphagia

## 2023-01-05 NOTE — CONSULT NOTE ADULT - PROBLEM SELECTOR RECOMMENDATION 2
Message  Mother called the office very anxious that patient was having severe menstrual cramps. States patient \"took some Motrin and is now sleeping.\" Ngoc would like to see a female MD so an appointment was offered and accepted for 4/27 w/[facility].      Signatures   Electronically signed by : Lauren Hernandez R.N.; Apr 24 2018  1:17PM CST     Facial ecchymoses

## 2023-01-05 NOTE — CONSULT NOTE ADULT - PROBLEM SELECTOR RECOMMENDATION 5
Actions:  [x] Rapport building     [x] Symptom assessment    [x] Eliciting preferences of goals   [] Prognostic understanding    [x] Emotional Support  [] Coping skill development  []  Other  Interdisciplinary Referrals: SW to help explore discharge options (DME)  Communication: d/w primary team in brief  Documentation Review: [x] Primary Team [x] Consultants [] Interdisciplinary team  Content: n/a

## 2023-01-05 NOTE — PROGRESS NOTE ADULT - ASSESSMENT
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 He has been managed supportively with Proc[facility] as out-pt and he responds well to that and have been able to avoid transfusions. He is here after falling and being found out of his home and with hypothermia.   He  has covid.   He has respiratory failure and had been intubated.    He has been requiring periodic transfusions.  Hgb trending downward. monitor for now  Dr. Pelletier spoke with ICU team, and he is back on epo.     He is getting iv iron, but ferritin is high and that is not needed from hematology perspective.   CKD is at baseline. Thrombocytopenia is mild. He has had that on and off and may have ITP or possible MDS as above. Also infection playing a role with bone marrow suppression. It is mild at this time and can monitor.    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 He has been managed supportively with Procrit as out-pt and he responds well to that and have been able to avoid transfusions. He is here after falling and being found out of his home and with hypothermia.   He  has covid.   He has respiratory failure and had been intubated.    He has been requiring periodic transfusions.  Hgb trending downward. monitor for now  Dr. Pelletier spoke with ICU team, and he is back on epo.     He is getting iv iron, but ferritin is high and that is not needed from hematology perspective. This was d/w house staff, today.  CKD is at baseline. Thrombocytopenia is mild. He has had that on and off and may have ITP or possible MDS as above. Also infection playing a role with bone marrow suppression. It is mild at this time and can monitor.

## 2023-01-05 NOTE — CONSULT NOTE ADULT - PROBLEM SELECTOR RECOMMENDATION 9
Initial discussion led by the primary team this morning  Comprehensive and accessible to pt and family broaching "pleasure feeds", and aspiration risk, and concerns about acute need in the event of a newly discovering issue  Subsequent discussion held with the patient, his daughter in law, and granddaughter (via telephone).  See GOC note Initial discussion led by the primary team this morning  Comprehensive and accessible to pt and family broaching "pleasure feeds", and aspiration risk, and concerns about acute need in the event of a newly discovering issue  Subsequent discussion held by palliative with the patient/ASL interpretation, his daughter in law, and granddaughter (via telephone).  See GOC note

## 2023-01-05 NOTE — CHART NOTE - NSCHARTNOTEFT_GEN_A_CORE
Nutrition Follow Up Note  Patient seen for: Nutrition Follow Up    Chart reviewed, events noted. Per chart review, patient is a "Patient is a 89 y/o male w/ a PMHx of congenital deafness (communicates w/ ASL), CAD s/p triple vessel CABG, s/p biventricular PPM, HTN, HLD, CKD stage IV, BPH, gout, and anemia presenting after being found down outside hypothermic, hypotensive, and altered requiring intubation AMS w/ imaging revealing bilateral nasal bone and nasal septum fractures. Patient admits to SICU for shock, ?septic, hypothermic".     Source: [x] EMR  -If unable to interview patient:  [x] Disoriented/confused/inappropriate to interview    Diet Order:   Diet, NPO (01-02-23)    - Is current order appropriate/adequate? [] Yes  [x]  No:     PO intake : [no]; NPO    Nutrition-related concerns, per chart review:  - Patient failed SLP eval on 1/4, recommended NPO diet (inadequate diet)  - Patient NPO x ~4 days; previously was on Jevity 1.5 (running at goal), d/c 1/1  - Goals of care discussion with family 1/4; discussion made to accept risk of aspiration w/ a DNR or reattempt SLP eval; family wants to reattempt SLP eval in a few days  - Patient was intubated for respiratory failure, then extubated on 01/2  - Patient has anemia; active order for Folic acid and Retacrit ; was previously ordered for iron but d/c 1/1  - Patient s/p diuresis with Lasix 100mg; lasix d/c 1/4    GI per chart review:    Last BM  1/5 x1, 1/4 x 2,.   Bowel Regimen? [x] No    Weights: 12/29 @ 133 pounds, 60 kg    Nutritionally Pertinent Medications:  Dextrose 5%  Retacrit  Folic Acid    Nutritionally Pertinent Labs:  BUN (high) 64  Creatinine (high) 2.34  Glucose serum (high) 366  Calcium (low) 8.0  A1C 12/29 @ 5.6%    Skin per nursing documentation:  Edema: 1/5 generalized  Pressure Injuries: Sacrum/suspected DTI  Skin: Wounds present on L lateral eyelid, L eyebrow, bridge of nose, upper lip    Estimated Needs: based on 60 kg, admission weight   Energy: 23-27 calories (9971-7139 kcal/kg)  Protein: 1.2-1.5 grams (72-90 g/kg)    Previous Nutrition Diagnosis: Inadequate energy intake  Nutrition Diagnosis is: [x] ongoing    Visual nutrition focused physical exam conducted - unable to consent in setting of mental status and patient sleeping at time of assessment.  New Nutrition Diagnosis: [x] Yes; severe acute malnutrition related to inadequate PO intake in setting of aspiration/dysphagia as evidenced by NPO x ~4 days, severe muscle and fat loss on shoulders and clavicles, edema.     Nutrition Care Plan:  [x] In Progress    Nutrition Interventions:  Education Provided:   [x] No: Nutrition education not appropriate at this time; no family present at bedside at time of assessment.     Recommendations:      - If diet were to advance, defer diet to medical team. If EN feeds initiated, monitor GI tolerance, refeeding syndrome.   - Consider MVI for micronutrient support  - Consider obtaining current weight  - Monitor labs, skin integrity, GI tolerance, weights, and bowel movement regularity    Monitoring and Evaluation:   Continue to monitor nutritional intake, tolerance to diet prescription, weights, labs, skin integrity    Marleen Echols, Dietetic Intern, Pager #: [phone], also available on TEAMS. Nutrition Follow Up Note  Patient seen for: Nutrition Follow Up    Chart reviewed, events noted. Per chart review, patient is a "Patient is a 89 y/o male w/ a PMHx of congenital deafness (communicates w/ ASL), CAD s/p triple vessel CABG, s/p biventricular PPM, HTN, HLD, CKD stage IV, BPH, gout, and anemia presenting after being found down outside hypothermic, hypotensive, and altered requiring intubation AMS w/ imaging revealing bilateral nasal bone and nasal septum fractures. Patient admits to SICU for shock, ?septic, hypothermic".     Source: [x] EMR  -If unable to interview patient:  [x] Disoriented/confused/inappropriate to interview    Diet Order:   Diet, NPO (01-02-23)    - Is current order appropriate/adequate? [] Yes  [x]  No:     PO intake : [no]; NPO    Nutrition-related concerns, per chart review:  - Patient failed SLP eval on 1/4, recommended NPO diet (inadequate diet)  - Patient NPO x ~4 days; previously was on Jevity 1.5 (running at goal), d/c 1/1  - Goals of care discussion with family 1/4; discussion made to accept risk of aspiration w/ a DNR or reattempt SLP eval; family wants to reattempt SLP eval in a few days  - Patient was intubated for respiratory failure, then extubated on 01/2  - Patient has anemia; active order for Folic acid and Retacrit ; was previously ordered for iron but d/c 1/1  - Patient s/p diuresis with Lasix 100mg; lasix d/c 1/4  - Noted Heme/Onc following for suspected MDS and possible MM     GI per chart review:    Last BM  1/5 x1, 1/4 x 2,.   Bowel Regimen? [x] No    Weights: 12/29 @ 133 pounds, 60 kg    Nutritionally Pertinent Medications:  Dextrose 5%  Retacrit  Folic Acid    Nutritionally Pertinent Labs:  BUN (high) 64  Creatinine (high) 2.34  Glucose serum (high) 366  Calcium (low) 8.0  A1C 12/29 @ 5.6%    Skin per nursing documentation:  Edema: 1/5 generalized  Pressure Injuries: Sacrum/suspected DTI  Skin: Wounds present on L lateral eyelid, L eyebrow, bridge of nose, upper lip    Estimated Needs: based on 60 kg, admission weight   Energy: 23-27 calories (3746-9589 kcal/kg)  Protein: 1.2-1.5 grams (72-90 g/kg)    Previous Nutrition Diagnosis: Inadequate energy intake  Nutrition Diagnosis is: [x] ongoing    Visual nutrition focused physical exam conducted - unable to consent in setting of mental status and patient sleeping at time of assessment.  New Nutrition Diagnosis: [x] Yes; severe acute malnutrition related to inadequate PO intake in setting of aspiration/dysphagia as evidenced by NPO x ~4 days, severe muscle and fat loss on shoulders and clavicles, edema.     Nutrition Care Plan:  [x] In Progress    Nutrition Interventions:  Education Provided:   [x] No: Nutrition education not appropriate at this time; no family present at bedside at time of assessment.     Recommendations:      - If diet were to advance, defer diet to medical team. If EN feeds initiated, monitor GI tolerance, refeeding syndrome.   - Consider MVI for micronutrient support  - Consider obtaining current weight  - Monitor labs, skin integrity, GI tolerance, weights, and bowel movement regularity    Monitoring and Evaluation:   Continue to monitor nutritional intake, tolerance to diet prescription, weights, labs, skin integrity    Marleen Echols, Dietetic Intern, Pager #: [phone], also available on TEAMS. Nutrition Follow Up Note  Patient seen for: Nutrition Follow Up    Chart reviewed, events noted. Per chart review, patient is a "Patient is a 89 y/o male w/ a PMHx of congenital deafness (communicates w/ ASL), CAD s/p triple vessel CABG, s/p biventricular PPM, HTN, HLD, CKD stage IV, BPH, gout, and anemia presenting after being found down outside hypothermic, hypotensive, and altered requiring intubation AMS w/ imaging revealing bilateral nasal bone and nasal septum fractures. Patient admits to SICU for shock, ?septic, hypothermic".     Source: [x] EMR  -If unable to interview patient:  [x] Disoriented/confused/inappropriate to interview    Diet Order:   Diet, NPO (01-02-23)    - Is current order appropriate/adequate? [] Yes  [x]  No: inadequate energy/protein needs    PO intake : [no]; NPO    Nutrition-related concerns, per chart review:  - Patient failed SLP eval on 1/4, recommended NPO diet (inadequate diet)  - Patient NPO x ~4 days; previously was on Jevity 1.5 (running at goal), d/c 1/1  - Goals of care discussion with family 1/4; discussion made to accept risk of aspiration w/ a DNR or reattempt SLP eval; family wants to reattempt SLP eval in a few days  - Patient was intubated for respiratory failure, then extubated on 01/2  - Patient has anemia; active order for Folic acid and Retacrit ; was previously ordered for iron but d/c 1/1  - Patient s/p diuresis with Lasix 100mg; lasix d/c 1/4  - Noted Heme/Onc following for suspected MDS and possible MM     GI per chart review:    Last BM  1/5 x1, 1/4 x 2,.   Bowel Regimen? [x] No    Weights: 12/29 @ 133 pounds, 60 kg    Nutritionally Pertinent Medications:  Dextrose 5%  Retacrit  Folic Acid    Nutritionally Pertinent Labs:  BUN (high) 64  Creatinine (high) 2.34  Glucose serum (high) 366  Calcium (low) 8.0  A1C 12/29 @ 5.6%    Skin per nursing documentation:  Edema: 1/5 generalized  Pressure Injuries: Sacrum/suspected DTI  Skin: Wounds present on L lateral eyelid, L eyebrow, bridge of nose, upper lip    Estimated Needs: based on 60 kg, admission weight   Energy: 23-27 calories (3742-2455 kcal/kg)  Protein: 1.2-1.5 grams (72-90 g/kg)    Previous Nutrition Diagnosis: Inadequate energy intake  Nutrition Diagnosis is: [x] ongoing    Visual nutrition focused physical exam conducted - unable to consent in setting of mental status and patient sleeping at time of assessment.  New Nutrition Diagnosis: [x] Yes; severe acute malnutrition related to inadequate PO intake in setting of aspiration/dysphagia as evidenced by NPO x ~4 days, severe muscle and fat loss on shoulders and clavicles, edema.     Nutrition Care Plan:  [x] In Progress    Nutrition Interventions:  Education Provided:   [x] No: Nutrition education not appropriate at this time; no family present at bedside at time of assessment.     Recommendations:      - If diet were to advance, defer diet to medical team. If EN feeds initiated, monitor GI tolerance, refeeding syndrome.   - Consider MVI for micronutrient support  - Consider obtaining current weight  - Monitor labs, skin integrity, GI tolerance, weights, and bowel movement regularity    Monitoring and Evaluation:   Continue to monitor nutritional intake, tolerance to diet prescription, weights, labs, skin integrity    Marleen Echols, Dietetic Intern, Pager #: [phone], also available on TEAMS. Nutrition Follow Up Note  Patient seen for: Nutrition Follow Up    Chart reviewed, events noted. Per chart review, patient is a "Patient is a 89 y/o male w/ a PMHx of congenital deafness (communicates w/ ASL), CAD s/p triple vessel CABG, s/p biventricular PPM, HTN, HLD, CKD stage IV, BPH, gout, and anemia presenting after being found down outside hypothermic, hypotensive, and altered requiring intubation AMS w/ imaging revealing bilateral nasal bone and nasal septum fractures. Patient admits to SICU for shock, ?septic, hypothermic".     Source: [x] EMR  -If unable to interview patient:  [x] Disoriented/confused/inappropriate to interview    Diet Order:   Diet, NPO (01-02-23)    - Is current order appropriate/adequate? [] Yes  [x]  No: inadequate energy/protein needs    PO intake : [no]; NPO    Nutrition-related concerns, per chart review:  - Patient failed SLP eval on 1/4, recommended NPO diet (inadequate diet)  - Patient NPO x ~4 days; previously was on Jevity 1.5 (running at goal), d/c 1/1  - Goals of care discussion with family 1/4; discussion made to accept risk of aspiration w/ a DNR or reattempt SLP eval; family wants to reattempt SLP eval in a few days  - Patient was intubated for respiratory failure, then extubated on 01/2  - Patient has anemia; active order for Folic acid and Retacrit ; was previously ordered for iron but d/c 1/1  - Patient s/p diuresis with Lasix 100mg; lasix d/c 1/4  - Noted Heme/Onc following for suspected MDS and possible MM     GI per chart review:    Last BM  1/5 x1, 1/4 x 2,.   Bowel Regimen? [x] No    Weights: 12/29 @ 133 pounds, 60 kg    Nutritionally Pertinent Medications:  Dextrose 5%  Retacrit  Folic Acid    Nutritionally Pertinent Labs:  BUN (high) 64  Creatinine (high) 2.34  Glucose serum (high) 366  Calcium (low) 8.0  A1C 12/29 @ 5.6%    Skin per nursing documentation:  Edema: 1/5 generalized  Pressure Injuries: Sacrum/suspected DTI  Skin: Wounds present on L lateral eyelid, L eyebrow, bridge of nose, upper lip    Estimated Needs: based on 60 kg, admission weight   Energy: 23-27 calories (2393-0943 kcal/kg)  Protein: 1.2-1.5 grams (72-90 g/kg)    Previous Nutrition Diagnosis: Inadequate energy intake  Nutrition Diagnosis is: [x] ongoing    Visual nutrition focused physical exam conducted - unable to consent in setting of mental status and patient sleeping at time of assessment.  New Nutrition Diagnosis: [x] Yes; severe acute malnutrition related to inadequate PO intake in setting of aspiration/dysphagia as evidenced by NPO x ~4 days, severe muscle and fat loss on shoulders and clavicles, edema.     Nutrition Care Plan:  [x] In Progress    Nutrition Interventions:  Education Provided:   [x] No: Nutrition education not appropriate at this time; no family present at bedside at time of assessment.     Recommendations:      - If diet were to advance, defer diet to medical team. If EN feeds were initiated, recommend Jevity 1.2 (trickle feeds) starting at 10ml/hr to increase to goal rate 50x24 hours to provide: (based on dosing wt 60kg): 1200ml, 1440kcal (24 kcal/kg), 108g protein (1.8g protein/kg).  , monitor GI tolerance, refeeding syndrome.   - Consider MVI for micronutrient support  - Consider obtaining current weight  - Monitor labs, skin integrity, GI tolerance, weights, and bowel movement regularity    Monitoring and Evaluation:   Continue to monitor nutritional intake, tolerance to diet prescription, weights, labs, skin integrity    Marleen Echols, Dietetic Intern, Pager #: [phone], also available on TEAMS. Nutrition Follow Up Note  Patient seen for: Nutrition Follow Up    Chart reviewed, events noted. Per chart review, "Patient is a 89 y/o male w/ a PMHx of congenital deafness (communicates w/ ASL), CAD s/p triple vessel CABG, s/p biventricular PPM, HTN, HLD, CKD stage IV, BPH, gout, and anemia presenting after being found down outside hypothermic, hypotensive, and altered requiring intubation AMS w/ imaging revealing bilateral nasal bone and nasal septum fractures. Patient admits to SICU for shock, ?septic, hypothermic".     Source: [] Patient       [x] Medical Record        [x] RN        [] Family at bedside       [] Other:    -If unable to interview patient: [] Trach/Vent/BiPAP  [x] Disoriented/confused/inappropriate to interview    Diet Order:   Diet, NPO (01-02-23)  Patient NPO x ~4 days; previously was on Jevity 1.5 (running at 45 mL/hr x24 hours), feeds stopped on 1/2  Per GOC on 1/4, "...However, given his nasal bone fractures, I stated that NGT placement would not be advisable..."    - Is current order adequate? [] Yes  [x]  No: inadequate energy/protein needs    PO intake :   [] >75%  Adequate    [] 50-75%  Fair       [] <50%  Poor  [x] N/A    Nutrition-related concerns, per chart review:  - Patient failed SLP eval on 1/4, recommended, "Based on results of this exam consider NPO, with non-oral nutrition, hydration, medications pending Pt/family/HCP wishes"  - Goals of care discussion with family 1/4; discussion made to accept risk of aspiration w/ a DNR or reattempt SLP eval; family wants to reattempt SLP evaluation in a few days  - Patient was intubated for respiratory failure, then extubated on 01/02  - Ordered for Folic acid; previously ordered for iron but d/c 1/5  - Patient diuresed with Lasix; last ordered on 1/4  - Noted Heme/Onc following for suspected myelodysplastic syndrome and possible multiple myeloma   - LUCY on CKD stage IV  - Bilateral nasal bone & nasal septum fracture, facial lacerations    GI per chart review:    Last BM  1/5 x1, 1/4 x 2.   Bowel Regimen? [x] No    Weights:   Dosing wt (12/29) 133 pounds/60 kG  Ht: 167.6 cm  BMI: 21.4 kG/m2  Per flowsheet bed scale not working, thus no daily wts to address. Pt in chair during RD visit, unable to verify if bed scale is fixed.    Nutrition focused physical exam conducted, pt with severe muscle wasting to shoulders and clavicles; full assessment not appropriate at this time.     Nutritionally Pertinent Medications:  Dextrose 5% at 30 mL/hr   Retacrit  Folic Acid    Nutritionally Pertinent Labs: (1/5) BUN (high) 64, Creatinine (high) 2.34, Glucose serum (high) 366, Calcium (low) 8.0  POCT BG 1/5: 95   A1C 12/29 @ 5.6%     Skin per nursing documentation: Pressure Injuries: Sacrum/suspected DTI  Edema per nursing documentation: 1+ generalized    Estimated Needs:   Based on dosing wt 60 kG with consideration for malnutrition, Hx of inadequate protein-energy intake, pressure injuries, advanced age, and medical course   Energy: (25-30 kcals/kG) 7095-5257 kcals  Protein: (1.2-1.5 grams/kG) 72-90 gm    Previous Nutrition Diagnosis:   1) Inadequate energy intake - upgraded  2) Increased Nutrient Needs  Nutrition Diagnosis is: [x] Ongoing    New Nutrition Diagnosis: Severe acute malnutrition related to inadequate PO intake with GOC ongoing as evidenced by <50% estimated nutrient needs >= 5 days , severe muscle loss on shoulders and clavicles, mild edema.     Nutrition Care Plan:  [x] In Progress    Nutrition Interventions:  Education Provided:   [x] No: Nutrition education not appropriate at this time; no family present at bedside at time of assessment.     Recommendations:      1) If diet were to advance, recommend no therapeutic restrictions given advanced age and malnutrition. Consistency deferred to SLP/Provider/GOC.  -> If EN feeds were initiated, recommend Jevity 1.2 starting at 10ml/hr x24 hours increase as tolerated and electrolytes WNL to goal rate 60x24 hours to provide: (based on dosing wt 60kG): 1440ml, 1728 kcal (~29 kcal/kg), 80 g protein (1.3g protein/kg), 1162 mL free water.  -> Monitor GI tolerance, refeeding syndrome (K, Phos, and Mg)  -> Continue to trend BG for need to change EN to Glucerna   2) Consider MVI and thiamine for micronutrient support/possible refeeding risk with Vitamin C to aid in wound healing, if medically feasible.  3) Consider obtaining current weight as able  4) Malnutrition alert placed in chart.    Monitoring and Evaluation:   Continue to monitor nutritional intake, tolerance to diet prescription, weights, labs, skin integrity    Marleen Echols, Dietetic Intern, Pager #: [phone], also available on TEAMS.

## 2023-01-06 NOTE — PROGRESS NOTE ADULT - SUBJECTIVE AND OBJECTIVE BOX
24 HOUR EVENTS:  -Attempted to place dobhoff feeding tube, coiling in oropharynx   -Lasix 20 IVP  -MS-waxes/wanes  -no acute events overnight     SUBJECTIVE/ROS:  [ ] A ten-point review of systems was otherwise negative except as noted.  [ ] Due to altered mental status/intubation, subjective information were not able to be obtained from the patient. History was obtained, to the extent possible, from review of the chart and collateral sources of information.      NEURO  RASS:   0  GCS:   15  CAM ICU:  Exam: awake, alert, oriented  Meds: acetaminophen   IVPB .. 750 milliGRAM(s) IV Intermittent every 6 hours PRN Mild Pain (1 - 3)    [x] Adequacy of sedation and pain control has been assessed and adjusted      RESPIRATORY  RR: 20 (01-06-23 @ 01:00) (17 - 35)  SpO2: 98% (01-06-23 @ 01:00) (92% - 100%)  Wt(kg): --  Exam: unlabored, clear to auscultation bilaterally  Mechanical Ventilation:     [N/A] Extubation Readiness Assessed  Meds:       CARDIOVASCULAR  HR: 60 (01-06-23 @ 01:00) (60 - 89)  BP: 118/64 (01-06-23 @ 01:00) (92/54 - 147/67)  BP(mean): 85 (01-06-23 @ 01:00) (68 - 96)  ABP: --  ABP(mean): --  Wt(kg): --  CVP(cm H2O): --      Cardiac Rhythm: paced  Perfusion     [x]Adequate   [ ]Inadequate  Mentation   [x]Normal       [ ]Reduced  Extremities  [x]Warm         [ ]Cool  Volume Status [ ]Hypervolemic [x]Euvolemic [ ]Hypovolemic  Meds:       GI/NUTRITION  Exam: soft, nontender, nondistended   Diet: NPO  Meds:     GENITOURINARY  I&O's Detail    01-04 @ 07:01  -  01-05 @ 07:00  --------------------------------------------------------  IN:    dextrose 5%: 720 mL  Total IN: 720 mL    OUT:    Intermittent Catheterization - Urethral (mL): 350 mL  Total OUT: 350 mL    Total NET: 370 mL      01-05 @ 07:01  -  01-06 @ 01:27  --------------------------------------------------------  IN:    dextrose 5%: 570 mL  Total IN: 570 mL    OUT:    Voided (mL): 550 mL  Total OUT: 550 mL    Total NET: 20 mL          01-05    147<H>  |  107  |  70<H>  ----------------------------<  106<H>  4.3   |  25  |  2.44<H>    Ca    9.1      05 Jan 2023 21:18  Phos  3.5     01-05  Mg     2.3     01-05      [ ] Davis catheter, indication: N/A  Meds: dextrose 5%. 1000 milliLiter(s) IV Continuous <Continuous>  folic acid Injectable 1 milliGRAM(s) IV Push every 24 hours        HEMATOLOGIC  Meds: enoxaparin Injectable 30 milliGRAM(s) SubCutaneous every 24 hours    [x] VTE Prophylaxis                        9.2    13.16 )-----------( 231      ( 05 Jan 2023 21:18 )             29.5     PT/INR - ( 05 Jan 2023 00:46 )   PT: 14.5 sec;   INR: 1.26 ratio         PTT - ( 05 Jan 2023 00:46 )  PTT:49.2 sec  Transfusion     [ ] PRBC   [ ] Platelets   [ ] FFP   [ ] Cryoprecipitate      INFECTIOUS DISEASES  WBC Count: 13.16 K/uL (01-05 @ 21:18)    RECENT CULTURES:    Meds: epoetin sherrie-epbx (RETACRIT) Injectable 78108 Unit(s) SubCutaneous <User Schedule>        ENDOCRINE  CAPILLARY BLOOD GLUCOSE      POCT Blood Glucose.: 95 mg/dL (05 Jan 2023 04:19)    Meds:       ACCESS DEVICES:  [x ] Peripheral IV  [ ] Central Venous Line	[ ] R	[ ] L	[ ] IJ	[ ] Fem	[ ] SC	Placed:   [ ] Arterial Line		[ ] R	[ ] L	[ ] Fem	[ ] Rad	[ ] Ax	Placed:   [ ] PICC:					[ ] Mediport  [ ] Urinary Catheter, Date Placed:   [x] Necessity of urinary, arterial, and venous catheters discussed    OTHER MEDICATIONS:  artificial  tears Solution 1 Drop(s) Both EYES four times a day  bacitracin   Ointment 1 Application(s) Topical two times a day  chlorhexidine 2% Cloths 1 Application(s) Topical <User Schedule>  sodium chloride 0.65% Nasal 1 Spray(s) Both Nostrils daily      CODE STATUS:  Yes        IMAGING:

## 2023-01-06 NOTE — PROGRESS NOTE ADULT - ASSESSMENT
89 year old male history of CAD, s/p CABG, PPM, HTN, CKD 4, HLD, gout, upper GI bleed 2/2022, recent COVID 12/25/22, presenting as a level 1 trauma activation after being found down unresponsive in his backyard by his son. Patient arrived hypothermic to 78F, hypotensive to 60/40, responded to 110's/70's with warm IVF resuscitation, HR 70's, O2 100%, GCS 10 (4,2,4). During initial resuscitation became progressively more obtunded and was intubated for airway protection. eFAST negative CXR and PXR in trauma bay unremarkable. Pt extubated on 1/2.    PLAN:  - SICU to start enteral feeding today through dobhoff  - Appreciate excellent care per Surgical Intensive Care Unit.     Surgery [phone]

## 2023-01-06 NOTE — PROGRESS NOTE ADULT - ASSESSMENT
89-year-old male with a macrocytic anemia cannot rule out MDS. 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 He gets Epo as out-pt. It is being continued here and the Hgb is higher today and no transfusion needed at this time.    Thrombocytopenia - he has had this on and off. Could be ITP, MDS, related to infection. The platelets have gotten better.    Admitted s/p fall and with Covid. He is doing overall better and he has been extubated. Care per ICU.

## 2023-01-06 NOTE — PROGRESS NOTE ADULT - SUBJECTIVE AND OBJECTIVE BOX
Trauma Surgery Progress Note  Patient is a 89y old  Male who presents with a chief complaint of s/p fall (01 Jan 2023 00:41)      INTERVAL EVENTS:   -Attempted to place dobhoff feeding tube, coiling in oropharynx   -Lasix 20 IVP  -MS-waxes/wanes  -no acute events overnight     SUBJECTIVE: Patient seen and examined at bedside with surgical team. Dobhoff tube placed at bedside by trauma team    OBJECTIVE:    Vital Signs Last 24 Hrs  T(C): 36.2 (06 Jan 2023 07:00), Max: 36.8 (05 Jan 2023 11:00)  T(F): 97.2 (06 Jan 2023 07:00), Max: 98.2 (05 Jan 2023 11:00)  HR: 63 (06 Jan 2023 10:00) (60 - 90)  BP: 110/57 (06 Jan 2023 10:00) (95/49 - 147/67)  BP(mean): 82 (06 Jan 2023 10:00) (70 - 101)  RR: 28 (06 Jan 2023 10:00) (20 - 35)  SpO2: 99% (06 Jan 2023 10:00) (92% - 100%)    Parameters below as of 06 Jan 2023 07:00  Patient On (Oxygen Delivery Method): room air    I&O's Detail    05 Jan 2023 07:01  -  06 Jan 2023 07:00  --------------------------------------------------------  IN:    dextrose 5%: 720 mL  Total IN: 720 mL    OUT:    Voided (mL): 550 mL  Total OUT: 550 mL    Total NET: 170 mL      06 Jan 2023 07:01  -  06 Jan 2023 10:25  --------------------------------------------------------  IN:    dextrose 5%: 30 mL  Total IN: 30 mL    OUT:  Total OUT: 0 mL    Total NET: 30 mL      MEDICATIONS  (STANDING):  artificial  tears Solution 1 Drop(s) Both EYES four times a day  aspirin  chewable 81 milliGRAM(s) Oral daily  atorvastatin 20 milliGRAM(s) Oral at bedtime  bacitracin   Ointment 1 Application(s) Topical two times a day  chlorhexidine 2% Cloths 1 Application(s) Topical <User Schedule>  enoxaparin Injectable 30 milliGRAM(s) SubCutaneous every 24 hours  epoetin sherrie-epbx (RETACRIT) Injectable 32354 Unit(s) SubCutaneous <User Schedule>  folic acid 1 milliGRAM(s) Oral daily  furosemide    Tablet 20 milliGRAM(s) Oral daily  sodium chloride 0.65% Nasal 1 Spray(s) Both Nostrils daily    MEDICATIONS  (PRN):  acetaminophen     Tablet .. 650 milliGRAM(s) Oral every 6 hours PRN Mild Pain (1 - 3)      PHYSICAL EXAM:  Constitutional: NAD  HEENT: face with multiple abrasions, ecchymosis from nasal fracture, closed laceration above left eyebrow  Respiratory: breathing comfortably  Abdomen: Soft, nondistended.  Extremities: LUE bruised from fingers to bicep area, area of apparent skin weeping covered with dressing. RUE w/ bruising from fingers to forearm    LABS:                        9.2    13.16 )-----------( 231      ( 05 Jan 2023 21:18 )             29.5     01-05    147<H>  |  107  |  70<H>  ----------------------------<  106<H>  4.3   |  25  |  2.44<H>    Ca    9.1      05 Jan 2023 21:18  Phos  3.5     01-05  Mg     2.3     01-05      PT/INR - ( 05 Jan 2023 00:46 )   PT: 14.5 sec;   INR: 1.26 ratio         PTT - ( 05 Jan 2023 00:46 )  PTT:49.2 sec          IMAGING:

## 2023-01-06 NOTE — PROGRESS NOTE ADULT - SUBJECTIVE AND OBJECTIVE BOX
Patient is a 89y old  Male who presents with a chief complaint of s/p fall (01 Jan 2023 00:41)      Medication:   acetaminophen     Tablet .. 650 milliGRAM(s) Oral every 6 hours PRN  artificial  tears Solution 1 Drop(s) Both EYES four times a day  aspirin  chewable 81 milliGRAM(s) Oral daily  atorvastatin 20 milliGRAM(s) Oral at bedtime  bacitracin   Ointment 1 Application(s) Topical two times a day  chlorhexidine 2% Cloths 1 Application(s) Topical <User Schedule>  enoxaparin Injectable 30 milliGRAM(s) SubCutaneous every 24 hours  epoetin sherrie-epbx (RETACRIT) Injectable 86770 Unit(s) SubCutaneous <User Schedule>  folic acid 1 milliGRAM(s) Oral daily  furosemide    Tablet 20 milliGRAM(s) Oral daily  sodium chloride 0.65% Nasal 1 Spray(s) Both Nostrils daily      Physical exam    T(C): 36.2 (01-06-23 @ 07:00), Max: 36.8 (01-05-23 @ 11:00)  HR: 76 (01-06-23 @ 09:00) (60 - 90)  BP: 136/57 (01-06-23 @ 09:00) (95/49 - 147/67)  RR: 25 (01-06-23 @ 09:00) (20 - 35)  SpO2: 99% (01-06-23 @ 09:00) (92% - 100%)  Wt(kg): --    alert NAD, facial  ecchymoses  EOMI anicteric sclera  Cv s1 S2 RRR  Lungs clear B/L anteriorly    Labs                        9.2    13.16 )-----------( 231      ( 05 Jan 2023 21:18 )             29.5       01-05    147<H>  |  107  |  70<H>  ----------------------------<  106<H>  4.3   |  25  |  2.44<H>    Ca    9.1      05 Jan 2023 21:18  Phos  3.5     01-05  Mg     2.3     01-05            [id number]

## 2023-01-06 NOTE — CHART NOTE - NSCHARTNOTESELECT_GEN_ALL_CORE
Incidental Findings
Plastic surgery recs/Event Note
EEG prelim
Nutrition Services
Speech & Swallow
Tertiary
facial laceration repair/Event Note

## 2023-01-06 NOTE — PROGRESS NOTE ADULT - ATTENDING COMMENTS
fall , hypothermic and shock, now resolved, but poor mental status    ON: nasoentric tube placed in    alert and awake and communicates with mary grace nowak (pt is deaf), sitting in chair, has eye glasses on sitting in chair using  mary grace nowak  IV tylenol  on RA, encourage IS  AM CXR largely unchanged  stable hemodynamics  hold home metop  failed swallow, discussed w family for enteric feeds while he recovers   placed nasoenteric tube, trickle feeds  no BMs  voiding, unmeasured  downtrending cr 2.4  recurred hypernatremia,  q6hrs  lasix 40 mg PO  EPO per heme recommendations every 2 wks  lovenox PPX  COVID pos  afebrile  gpc bcx initally,likely contaminant  s/p meropenem  good glycemic control  PT OT working with him, SW for placement  removal of facial sutures on Sunday  family at bedside, updated them  pt has a living will, DNR DNI    PIVs

## 2023-01-06 NOTE — CHART NOTE - NSCHARTNOTEFT_GEN_A_CORE
89 year old male history of PMHx of congenital deafness (communicates w/ ASL), CAD, s/p CABG, PPM, HTN, CKD 4, HLD, gout, upper GI bleed 2/2022, recent COVID 12/25/22, presenting as a level 1 trauma activation after being found down unresponsive in his backyard by his son.  LUCY on CKD stage IV (baseline Cr 2-3); TOV today. Per MD: +face with multiple abrasions and dried blood diffusely, ecchymosis from nasal fracture, bilateral nasal bone and nasal septum fractures; closed laceration above left eyebrow.  Outpatient follow-up w/ plastics; - Sinus precautions; - Facial lacerations. 12/29/22 CT chest: IMPRESSION: No imaging evidence of acute traumatic injury involving the chest, abdomen or pelvis. Likely aspirated material within bilateral lower lobes. Pulmonary interstitial edema with probable alveolar edema versus pneumonia or aspiration. Small pleural effusions.  Pt seen with daughter in law and  at b/s.  Upon encounter, Pt seated upright in bed with Nsg suctioning Pt for thick creamy secretions.  Intermittent wet cough persisted throughout session.  Pt denied pain although ASL reported +confused language and Pt perseverating on flying on a plane.  +NGT in place.  SP02>98%.  Frequent suctioning required during session.  Oral care provided with green toothette.  Pt provided with 1 tspn of mildly thickened liquids: Formation, grading, control and transfer of bolus were impaired.  Delayed pharyngeal swallow is suspected.  Cough post swallow is considered to be s/s of laryngeal penetration/aspiration and additional trials of PO were deferred.  Impressions: 89 y/o male, Level 1 trauma s/p respiratory distress and now extubated, Covid+, who presents with decreased management of secretions in addition to  oropharyngeal dysphagia based upon b/s exam  Recommendations; NPO, with non-oral nutrition/hydration/medications. Suggest MD f/u with family/HCP/Pt to determine the GOC with re: long term provision of nutrition, hydration and meds in this patient; Palliative care f/u pending as Pt's daughter in law expressed interest in possible pleasure feeds; SLP to f/u at a later date to determine Pt's candidacy for reevaluation of swallowing vs MBS to objectively assess pharyngeal swallow and r/o aspiration pending GOC discussion.   Maintain aspiration precaution for Pt' own secretions and during enteral feeds.  Shelby Ospina, MS CCC-SLP   Pgr # [phone]   Above d/w Nsg, Pt/family, ACP

## 2023-01-06 NOTE — PROGRESS NOTE ADULT - ASSESSMENT
Patient is a 89 y/o male w/ a PMHx of congenital deafness (communicates w/ ASL), CAD s/p triple vessel CABG, s/p biventricular PPM, HTN, HLD, CKD stage IV, BPH, gout, and anemia present as Level 1 Trauma after being found down outside for unknown length of time, intubated in Trauma bay d/t AMS. Admitted to SICU for ventilatory support. Initially requiring vasopressor and inotropic support, now weaned. Extubated 1/2.     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   -Maintain MAP>65    GI: failed S/S 1/4  - OGT removed with extubation  - Dobhoff placement attempted multiple times 1/5, without success   - Remains NPO  - Plan for repeat S/S in a few days  - Protonix for stress ulcer prophylaxis    Renal: LUCY on CKD stage IV (baseline Cr 2-3)  - Replete lytes PRN  - D5 @ 30cc  - Voiding    Heme:   - Monitor CBC and coags   - Started on Retacrit by Heme for suspected MDS and possible MM  - VTE ppx: Lovenox   - SCDs    ID: COVID-19 infection resolved, Blood Cx 12/29 w G + Cocci   - Meropenem 12/29-1/4  - repeat blood cultures: NGTD     Endo: hyperglycemia  - Monitor glucose w/ ISS q6hrs for glycemic control  - HgbA1C 5.6%    MSK: bilateral nasal bone & nasal septum fracture, facial lacerations  - Outpatient follow-up w/ plastics  - Sinus precautions  - Facial lacerations repaired w/ Nylon sutures on 12/29    GOC:  - DNR     Dispo:   -SICU

## 2023-01-07 NOTE — PROGRESS NOTE ADULT - ATTENDING COMMENTS
fall , hypothermic and shock, now resolved, but poor mental status    ON: agitated overnight    alert and awake and communicates with mary grace nowak (pt is deaf), sitting in chair, has eye glasses on sitting in chair using  mary grace nowak  more delirius today, multifactorial  IV tylenol  on RA, encourage IS  AM CXR pending  stable hemodynamics  start lose dose metop  failed swallow, discussed w family for enteric feeds while he recovers   NPO, TEN @ 45/hr  no BMs  voiding, unmeasured  stable cr 2.4  recurred hypernatremia, increase  q4hrs  lasix 40 mg PO  required straight cath overnight  EPO per heme recommendations every 2 wks  lovenox PPX, asa   COVID pos  afebrile  escalating leukocytosis, given the delirium will work up for infection source. evaluate for alternate source.   gpc bcx initally,likely contaminant  s/p meropenem  good glycemic control  PT OT working with him, SW for placement  removal of facial sutures on Sunday  will update family today  pt has a living will, DNR DNI    PIVs

## 2023-01-07 NOTE — PROGRESS NOTE ADULT - SUBJECTIVE AND OBJECTIVE BOX
Patient is a 89y old  Male who presents with a chief complaint of s/p fall (01 Jan 2023 00:41)      Medication:   acetaminophen     Tablet .. 650 milliGRAM(s) Oral every 6 hours PRN  albuterol/ipratropium for Nebulization 3 milliLiter(s) Nebulizer every 6 hours  artificial  tears Solution 1 Drop(s) Both EYES four times a day  aspirin  chewable 81 milliGRAM(s) Oral daily  atorvastatin 20 milliGRAM(s) Oral at bedtime  bacitracin   Ointment 1 Application(s) Topical two times a day  chlorhexidine 2% Cloths 1 Application(s) Topical <User Schedule>  enoxaparin Injectable 30 milliGRAM(s) SubCutaneous every 24 hours  epoetin sherrie-epbx (RETACRIT) Injectable 32994 Unit(s) SubCutaneous <User Schedule>  folic acid 1 milliGRAM(s) Oral daily  furosemide    Tablet 20 milliGRAM(s) Oral daily  melatonin 3 milliGRAM(s) Oral at bedtime PRN  metoprolol tartrate 12.5 milliGRAM(s) Enteral Tube every 12 hours  metoprolol tartrate 12.5 milliGRAM(s) Enteral Tube once  sodium chloride 0.65% Nasal 1 Spray(s) Both Nostrils daily      Physical exam    T(C): 36.3 (01-07-23 @ 19:00), Max: 36.9 (01-07-23 @ 08:00)  HR: 60 (01-07-23 @ 20:00) (60 - 116)  BP: 110/54 (01-07-23 @ 20:00) (83/44 - 133/60)  RR: 21 (01-07-23 @ 20:00) (20 - 33)  SpO2: 97% (01-07-23 @ 20:00) (89% - 100%)  Wt(kg): --    resting NAD  CV RRR  Resp non labored    Labs                        8.8    13.57 )-----------( 235      ( 06 Jan 2023 22:26 )             28.1       01-06    148<H>  |  107  |  72<H>  ----------------------------<  103<H>  4.5   |  25  |  2.44<H>    Ca    9.1      06 Jan 2023 22:26  Phos  3.4     01-06  Mg     2.2     01-06            [id number]

## 2023-01-07 NOTE — PROGRESS NOTE ADULT - ASSESSMENT
89 y/o male w/ a PMHx of congenital deafness (communicates w/ ASL), CAD s/p triple vessel CABG, s/p biventricular PPM, HTN, HLD, CKD stage IV, BPH, gout, and anemia present as Level 1 Trauma after being found down outside for unknown length of time, intubated in Trauma bay d/t AMS. Admitted to SICU for ventilatory support. Initially requiring vasopressor and inotropic support, now weaned. Extubated 1/2.     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   -Maintain MAP>65    GI: failed S/S 1/4 and repeat on 1/6  - OGT removed with extubation   - Dobhoff placement attempted multiple times 1/5, without success, placed 1/6  - NPO w TF via Dobhoff   - Protonix for stress ulcer prophylaxis    Renal: LUCY on CKD stage IV (baseline Cr 2-3)  - Replete lytes PRN  - D5 @ 30cc  - Voiding    Heme:   - Monitor CBC and coags   - Started on Retacrit by Heme for suspected MDS and possible MM  - VTE ppx: Lovenox   - SCDs    ID: COVID-19 infection resolved, Blood Cx 12/29 w G + Cocci, repeat Blood Cx NGTD   - Meropenem 12/29-1/4  - repeat blood cultures: NGTD     Endo: hyperglycemia  - Monitor glucose w/ ISS q6hrs for glycemic control  - HgbA1C 5.6%    MSK: bilateral nasal bone & nasal septum fracture, facial lacerations  - Outpatient follow-up w/ plastics  - Sinus precautions  - Facial lacerations repaired w/ Nylon sutures on 12/29  - Sutures to be removed Sunday 1/8    GOC:  - DNR, ongoing conversation with family regarding DNI status     Dispo:   -SICU

## 2023-01-07 NOTE — PROGRESS NOTE ADULT - ASSESSMENT
Anemia due to CKD and he has had macrocytosis so he may also have MDS. He was requiring transfusions earlier in the admission. He was re started on Epo and he gets 10,ooo units TIW. HGb has overall been high. Adequate today and no need for a transfusion at this time.    care per ICU

## 2023-01-07 NOTE — PROGRESS NOTE ADULT - SUBJECTIVE AND OBJECTIVE BOX
24 HOUR EVENTS:  -Failed Repeat S/S yesterday  -o/n Pt Self Dc'd NGT   -NGT replaced   -TF restarted   -Agitated o/n   -1:1   -1 mg Haldol w/o improvement  -Precedex gtt started     SUBJECTIVE/ROS:  [ ] A ten-point review of systems was otherwise negative except as noted.  [ ] Due to altered mental status/intubation, subjective information were not able to be obtained from the patient. History was obtained, to the extent possible, from review of the chart and collateral sources of information.      NEURO  RASS:  +1  GCS:  11   CAM ICU:  Exam: awake, alert, oriented  Meds: acetaminophen     Tablet .. 650 milliGRAM(s) Oral every 6 hours PRN Mild Pain (1 - 3)  dexMEDEtomidine Infusion 0.1 MICROgram(s)/kG/Hr IV Continuous <Continuous>  melatonin 3 milliGRAM(s) Oral at bedtime PRN Insomnia    [x] Adequacy of sedation and pain control has been assessed and adjusted    RESPIRATORY  RR: 26 (01-06-23 @ 23:00) (20 - 34)  SpO2: 95% (01-06-23 @ 23:00) (92% - 99%)  Wt(kg): --  Exam: unlabored, clear to auscultation bilaterally  Meds: albuterol/ipratropium for Nebulization 3 milliLiter(s) Nebulizer every 6 hours    CARDIOVASCULAR  HR: 62 (01-06-23 @ 23:00) (60 - 90)  BP: 128/55 (01-06-23 @ 23:00) (95/49 - 146/63)  BP(mean): 81 (01-06-23 @ 23:00) (70 - 101)  ABP: --  ABP(mean): --  Wt(kg): --  CVP(cm H2O): --      Exam: paced  Cardiac Rhythm: paced  Perfusion     [x]Adequate   [ ]Inadequate  Mentation   [x]Normal       [ ]Reduced  Extremities  [x]Warm         [ ]Cool  Volume Status [ ]Hypervolemic [x]Euvolemic [ ]Hypovolemic  Meds: furosemide    Tablet 20 milliGRAM(s) Oral daily        GI/NUTRITION  Exam: soft, nontender, nondistended   Diet: NPO w TF     GENITOURINARY  I&O's Detail    01-05 @ 07:01  -  01-06 @ 07:00  --------------------------------------------------------  IN:    dextrose 5%: 720 mL  Total IN: 720 mL    OUT:    Voided (mL): 550 mL  Total OUT: 550 mL    Total NET: 170 mL      01-06 @ 07:01  -  01-07 @ 00:17  --------------------------------------------------------  IN:    dextrose 5%: 30 mL    Enteral Tube Flush: 90 mL    Free Water: 750 mL    Jevity 1.5: 200 mL  Total IN: 1070 mL    OUT:  Total OUT: 0 mL    Total NET: 1070 mL          01-06    148<H>  |  107  |  72<H>  ----------------------------<  103<H>  4.5   |  25  |  2.44<H>    Ca    9.1      06 Jan 2023 22:26  Phos  3.4     01-06  Mg     2.2     01-06      [ ] Davis catheter, indication: N/A  Meds: folic acid 1 milliGRAM(s) Oral daily        HEMATOLOGIC  Meds: aspirin  chewable 81 milliGRAM(s) Oral daily  enoxaparin Injectable 30 milliGRAM(s) SubCutaneous every 24 hours    [x] VTE Prophylaxis                        8.8    13.57 )-----------( 235      ( 06 Jan 2023 22:26 )             28.1     PT/INR - ( 06 Jan 2023 22:26 )   PT: 13.9 sec;   INR: 1.21 ratio         PTT - ( 06 Jan 2023 22:26 )  PTT:38.9 sec  Transfusion     [ ] PRBC   [ ] Platelets   [ ] FFP   [ ] Cryoprecipitate      INFECTIOUS DISEASES  WBC Count: 13.57 K/uL (01-06 @ 22:26)    RECENT CULTURES:    Meds: epoetin sherrie-epbx (RETACRIT) Injectable 16420 Unit(s) SubCutaneous <User Schedule>        ENDOCRINE  CAPILLARY BLOOD GLUCOSE        Meds: atorvastatin 20 milliGRAM(s) Oral at bedtime        ACCESS DEVICES:  [x ] Peripheral IV  [ ] Central Venous Line	[ ] R	[ ] L	[ ] IJ	[ ] Fem	[ ] SC	Placed:   [ ] Arterial Line		[ ] R	[ ] L	[ ] Fem	[ ] Rad	[ ] Ax	Placed:   [ ] PICC:					[ ] Mediport  [ ] Urinary Catheter, Date Placed:   [x] Necessity of urinary, arterial, and venous catheters discussed    OTHER MEDICATIONS:  artificial  tears Solution 1 Drop(s) Both EYES four times a day  bacitracin   Ointment 1 Application(s) Topical two times a day  chlorhexidine 2% Cloths 1 Application(s) Topical <User Schedule>  sodium chloride 0.65% Nasal 1 Spray(s) Both Nostrils daily      CODE STATUS:  Yes        IMAGING:

## 2023-01-07 NOTE — PROGRESS NOTE ADULT - SUBJECTIVE AND OBJECTIVE BOX
Surgery Progress Note     24hour Events:   - patient was agitated overnight, pulled out his dobhoff which was subsequently replaced, precedex gtt was started but d/c'd by AM rounds    Subjective:  Patient seen and examined.       Vital Signs:  Vital Signs Last 24 Hrs  T(C): 36.8 (07 Jan 2023 11:00), Max: 36.9 (07 Jan 2023 08:00)  T(F): 98.2 (07 Jan 2023 11:00), Max: 98.5 (07 Jan 2023 08:00)  HR: 60 (07 Jan 2023 11:00) (60 - 116)  BP: 89/51 (07 Jan 2023 11:00) (83/44 - 142/68)  BP(mean): 65 (07 Jan 2023 11:00) (59 - 101)  RR: 31 (07 Jan 2023 11:00) (20 - 34)  SpO2: 98% (07 Jan 2023 11:00) (95% - 100%)    Parameters below as of 07 Jan 2023 08:00  Patient On (Oxygen Delivery Method): room air        CAPILLARY BLOOD GLUCOSE          I&O's Detail    06 Jan 2023 07:01  -  07 Jan 2023 07:00  --------------------------------------------------------  IN:    Dexmedetomidine: 19.5 mL    dextrose 5%: 30 mL    Enteral Tube Flush: 130 mL    Free Water: 1250 mL    Jevity 1.5: 450 mL  Total IN: 1879.5 mL    OUT:    Intermittent Catheterization - Urethral (mL): 450 mL  Total OUT: 450 mL    Total NET: 1429.5 mL      07 Jan 2023 07:01  -  07 Jan 2023 11:37  --------------------------------------------------------  IN:    Free Water: 250 mL    Jevity 1.5: 205 mL  Total IN: 455 mL    OUT:  Total OUT: 0 mL    Total NET: 455 mL            Physical Exam:  Constitutional: NAD  HEENT: face with multiple abrasions, ecchymosis from nasal fracture, closed laceration above left eyebrow  Respiratory: breathing comfortably  Abdomen: Soft, nondistended.  Extremities: LUE bruised from fingers to bicep area, area of apparent skin weeping covered with dressing. RUE w/ bruising from fingers to forearm      Labs:    01-06    148<H>  |  107  |  72<H>  ----------------------------<  103<H>  4.5   |  25  |  2.44<H>    Ca    9.1      06 Jan 2023 22:26  Phos  3.4     01-06  Mg     2.2     01-06                              8.8    13.57 )-----------( 235      ( 06 Jan 2023 22:26 )             28.1     PT/INR - ( 06 Jan 2023 22:26 )   PT: 13.9 sec;   INR: 1.21 ratio         PTT - ( 06 Jan 2023 22:26 )  PTT:38.9 sec

## 2023-01-07 NOTE — PROGRESS NOTE ADULT - ASSESSMENT
89 year old male history of CAD, s/p CABG, PPM, HTN, CKD 4, HLD, gout, upper GI bleed 2/2022, recent COVID 12/25/22, presenting as a level 1 trauma activation after being found down unresponsive in his backyard by his son. Patient arrived hypothermic to 78F, hypotensive to 60/40, responded to 110's/70's with warm IVF resuscitation, HR 70's, O2 100%, GCS 10 (4,2,4). During initial resuscitation became progressively more obtunded and was intubated for airway protection. eFAST negative CXR and PXR in trauma bay unremarkable. Pt extubated on 1/2.    PLAN:  - continue TF via dobhoff  - ongoing GOC conversations with family  - Appreciate excellent care per Surgical Intensive Care Unit    Surgery [phone]

## 2023-01-08 NOTE — PROGRESS NOTE ADULT - NS ATTEND AMEND GEN_ALL_CORE FT
fall , hypothermic and shock, now resolved, but poor mental status    ON: obtunded    alert and awake and communicates with sign eliu (pt is deaf), sitting in chair, has eye glasses on sitting in chair using  sign eliu  eyes open on exam, not tracking and unresponsive, spont breathing  IV tylenol  received 1mg haldol yesterday  on venti mask, coarse breathing sounds   1/7 CXR bilat pleural effusion  stable hemodynamics  metop on hold  failed swallow, given mental status  NPO  no BMs  cr uptrending  D5 @ 30/hr for hypernatremia  lasix 40 mg IV, for goal even  baez in place  EPO per heme recommendations every 2 wks  sqh PPX  asa on hold  COVID pos  afebrile  stable leukocytosis, unclear infectious source  gpc bcx initially, likely contaminant  s/p meropenem  good glycemic control  PT OT when able  remove facial sutures  discussed with family, recommended conservative management, expressed doubt an infectious source for deterioration, and made a shared decision to hold off empiric antitbiotics for now and monitor him for progression. explained comfort care options and they are amenable and understand his condition.   pt has a living will, DNR DNI    PIVs fall , hypothermic and shock, now resolved, but poor mental status    ON: obtunded    alert and awake and communicates with sign eliu (pt is deaf), sitting in chair, has eye glasses on sitting in chair using  mary grace nowak  eyes open on exam, not tracking and unresponsive, spont breathing  IV tylenol  received 1mg haldol yesterday  on venti mask, coarse breathing sounds   1/7 CXR bilat pleural effusion  stable hemodynamics  metop on hold  failed swallow, given mental status  NPO  no BMs  cr uptrending  D5 @ 30/hr for hypernatremia  lasix 40 mg IV, for goal even  baez in place  EPO per heme recommendations every 2 wks  sqh PPX  asa on hold  COVID pos  afebrile  stable leukocytosis, unclear infectious source  gpc bcx initially, likely contaminant  s/p meropenem  good glycemic control  PT OT when able  remove facial sutures  discussed with family, recommended conservative management, expressed doubt an infectious source for deterioration, and made a shared decision to hold off empiric antitbiotics for now and monitor him for progression. explained comfort care options and they are amenable and understand his condition.   pt has a living will, DNR DNI    PIVs    addendum: had extensive discussions with son, daughter, and grand daughter, explained that given rapid deterioration, and his wishes for no invasive measures, comfort care is recommended. they have asked questions and expressed understanding. we proceeded to providing comfort and the family understands that he passes naturally.

## 2023-01-08 NOTE — PROGRESS NOTE ADULT - ASSESSMENT
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 Getting Epo and Hgb at baseline and adequate and no need for a transfusion at this time. Creatinine is at baseline.    He was doing better last week and now declining with more confusion and obtunded. Labored breathing and rhonchi - pulmonary edema, aspiration pneumonia. He will not be re intubated if needed and family comfortable with comfort care but also would like to give supportive care so would consider adding antibiotics. Discussed with ICU attending.

## 2023-01-08 NOTE — PROGRESS NOTE ADULT - SUBJECTIVE AND OBJECTIVE BOX
SICU Daily Progress Note  =====================================================  Interval/Overnight Events:     - UA and CXR showed no signs of acute infection in setting of elevated WBC  - Home Metoprolol 12.5mg q 12hrs retsarted.  - Free water increased to 250mL q 4hrs  - Agitated and given Haldol 1mg x 1     HPI:  This is a 89 y/o male w/ a PMHx of CAD s/p triple vessel CABG, HTN, HLD, CKD stage IV, BPH, gout, and anemia who presented as a level 1 trauma after being found outside on the ground by son who reports that patient who is normally A&Ox4 has been intermittently lethargic and confused since covid dx (). In the ED, GCS was 10 with worsening mental status requiring intubation for airway protection and noted to be hypothermic to 25° C, hypotension (60s/40s > 110's/70's after 1L) and with a 3 mm left eyebrow lac, left nare laceration, a left lip laceration, left periorbital ecchymosis, and bilateral nasal bone/ nasal septum fractures on imaging. Transferred to SICU for hemodynamic monitoring. Pt ultimately extuabted on     Allergies: No Known Allergies      MEDICATIONS:   --------------------------------------------------------------------------------------  Neurologic Medications  acetaminophen     Tablet .. 650 milliGRAM(s) Oral every 6 hours PRN Mild Pain (1 - 3)  melatonin 3 milliGRAM(s) Oral at bedtime PRN Insomnia    Respiratory Medications  albuterol/ipratropium for Nebulization 3 milliLiter(s) Nebulizer every 6 hours    Cardiovascular Medications  furosemide    Tablet 20 milliGRAM(s) Oral daily  metoprolol tartrate 12.5 milliGRAM(s) Enteral Tube every 12 hours  metoprolol tartrate 12.5 milliGRAM(s) Enteral Tube once    Gastrointestinal Medications  folic acid 1 milliGRAM(s) Oral daily    Genitourinary Medications    Hematologic/Oncologic Medications  aspirin  chewable 81 milliGRAM(s) Oral daily  enoxaparin Injectable 30 milliGRAM(s) SubCutaneous every 24 hours  epoetin sherrie-epbx (RETACRIT) Injectable 47299 Unit(s) SubCutaneous <User Schedule>    Antimicrobial/Immunologic Medications    Endocrine/Metabolic Medications  atorvastatin 20 milliGRAM(s) Oral at bedtime    Topical/Other Medications  artificial  tears Solution 1 Drop(s) Both EYES four times a day  bacitracin   Ointment 1 Application(s) Topical two times a day  chlorhexidine 2% Cloths 1 Application(s) Topical <User Schedule>  sodium chloride 0.65% Nasal 1 Spray(s) Both Nostrils daily    --------------------------------------------------------------------------------------    VITAL SIGNS, INS/OUTS (last 24 hours):  --------------------------------------------------------------------------------------  T(C): 36.8 (23 @ 22:00), Max: 36.9 (23 @ 08:00)  HR: 61 (23 @ 23:00) (58 - 116)  BP: 111/56 (23 @ 23:00) (83/44 - 120/55)  RR: 24 (23 @ 23:00) (17 - 33)  SpO2: 98% (23 @ 23:00) (89% - 100%)    23 @ 07:01  -  23 @ 07:00  --------------------------------------------------------  IN: 1879.5 mL / OUT: 450 mL / NET: 1429.5 mL    23 @ 07:01  -  23 @ 00:02  --------------------------------------------------------  IN: 1770 mL / OUT: 300 mL / NET: 1470 mL      --------------------------------------------------------------------------------------    EXAM  NEUROLOGY  Exam: Normal, NAD, alert, oriented x3, no focal deficits.    HEENT  Exam: Normocephalic, atraumatic, EOMI.     RESPIRATORY  Exam: Normal expansion/effort.  Mechanical Ventilation:     CARDIOVASCULAR  Exam: Regular rate and rhythm.       GI/NUTRITION  Exam: Abdomen soft, Non-tender, Non-distended.     VASCULAR  Exam: Extremities warm, pink, well-perfused.     MUSCULOSKELETAL  Exam: All extremities moving spontaneously without limitations.     SKIN  Exam: Good skin turgor, no skin breakdown.       LABS  --------------------------------------------------------------------------------------                        8.4    12.03 )-----------( 226      ( 2023 22:09 )             26.7   01-07    146<H>  |  107  |  75<H>  ----------------------------<  129<H>  4.1   |  29  |  2.51<H>    Ca    8.7      2023 22:09  Phos  2.8     -  Mg     2.8     -    Urinalysis Basic - ( 2023 14:53 )    Color: Yellow / Appearance: Slightly Turbid / S.018 / pH: x  Gluc: x / Ketone: Negative  / Bili: Negative / Urobili: 2 mg/dL   Blood: x / Protein: 100 mg/dl / Nitrite: Negative   Leuk Esterase: Negative / RBC: 159 /hpf / WBC 4 /HPF   Sq Epi: x / Non Sq Epi: 1 /hpf / Bacteria: Negative    PT/INR - ( 2023 22:26 )   PT: 13.9 sec;   INR: 1.21 ratio         PTT - ( 2023 22:26 )  PTT:38.9 sec  --------------------------------------------------------------------------------------     SICU Daily Progress Note  =====================================================  Interval/Overnight Events:     - UA and CXR showed no signs of acute infection in setting of elevated WBC  - Home Metoprolol 12.5mg q 12hrs retsarted.  - Free water increased to 250mL q 4hrs  - Agitated and given Haldol 1mg x 1     HPI:  This is a 89 y/o male w/ a PMHx of CAD s/p triple vessel CABG, HTN, HLD, CKD stage IV, BPH, gout, and anemia who presented as a level 1 trauma after being found outside on the ground by son who reports that patient who is normally A&Ox4 has been intermittently lethargic and confused since covid dx (). In the ED, GCS was 10 with worsening mental status requiring intubation for airway protection and noted to be hypothermic to 25° C, hypotension (60s/40s > 110's/70's after 1L) and with a 3 mm left eyebrow lac, left nare laceration, a left lip laceration, left periorbital ecchymosis, and bilateral nasal bone/ nasal septum fractures on imaging. Transferred to SICU for hemodynamic monitoring.    Allergies: No Known Allergies      MEDICATIONS:   --------------------------------------------------------------------------------------  Neurologic Medications  acetaminophen     Tablet .. 650 milliGRAM(s) Oral every 6 hours PRN Mild Pain (1 - 3)  melatonin 3 milliGRAM(s) Oral at bedtime PRN Insomnia    Respiratory Medications  albuterol/ipratropium for Nebulization 3 milliLiter(s) Nebulizer every 6 hours    Cardiovascular Medications  furosemide    Tablet 20 milliGRAM(s) Oral daily  metoprolol tartrate 12.5 milliGRAM(s) Enteral Tube every 12 hours  metoprolol tartrate 12.5 milliGRAM(s) Enteral Tube once    Gastrointestinal Medications  folic acid 1 milliGRAM(s) Oral daily    Genitourinary Medications    Hematologic/Oncologic Medications  aspirin  chewable 81 milliGRAM(s) Oral daily  enoxaparin Injectable 30 milliGRAM(s) SubCutaneous every 24 hours  epoetin sherrie-epbx (RETACRIT) Injectable 44671 Unit(s) SubCutaneous <User Schedule>    Antimicrobial/Immunologic Medications    Endocrine/Metabolic Medications  atorvastatin 20 milliGRAM(s) Oral at bedtime    Topical/Other Medications  artificial  tears Solution 1 Drop(s) Both EYES four times a day  bacitracin   Ointment 1 Application(s) Topical two times a day  chlorhexidine 2% Cloths 1 Application(s) Topical <User Schedule>  sodium chloride 0.65% Nasal 1 Spray(s) Both Nostrils daily    --------------------------------------------------------------------------------------    VITAL SIGNS, INS/OUTS (last 24 hours):  --------------------------------------------------------------------------------------  T(C): 36.8 (23 @ 22:00), Max: 36.9 (23 @ 08:00)  HR: 61 (23 @ 23:00) (58 - 116)  BP: 111/56 (23 @ 23:00) (83/44 - 120/55)  RR: 24 (23 @ 23:00) (17 - 33)  SpO2: 98% (23 @ 23:00) (89% - 100%)    23 @ 07:01  -  23 @ 07:00  --------------------------------------------------------  IN: 1879.5 mL / OUT: 450 mL / NET: 1429.5 mL    23 @ 07:01  -  23 @ 00:02  --------------------------------------------------------  IN: 1770 mL / OUT: 300 mL / NET: 1470 mL      --------------------------------------------------------------------------------------  EXAM  GENERAL:   No acute distress, lethargic but arousable to touch.   HEAD/EYES:    PERRL, conjunctiva and sclera clear. Left periorbital ecchymosis and sutured left eyebrow lac (no oozing or signs of infection)  ENMT:   No oropharyngeal exudates, erythema or lesions, dry mucous membranes  NECK:   Supple, no cervical lymphadenopathy, no JVD.  NERVOUS SYSTEM:   Alert and confused, CN II-XII intact, Moves all extremities.  CHEST/LUNG:   Diminished breath sounds bilaterally  without crackles, rhonchi, wheezes, rubs.   HEART:   cardiac monitor NSR HR 83, AV paced ; S1/S2 without murmurs, without rubs, or gallops.   ABDOMEN:   Soft, Nontender, Nondistended; Bowel sounds present, Bladder non distended, non palpable  EXTREMITIES:   Pulses palpable radial pulses 2+ bilat, DP/PT 1+/1+ bilat, without clubbing, cyanosis. Digits warm to touch with good cap refill < 3 secs.  SKIN:   warm, dry, intact, normal color, no rash or abnormal lesions, without palpable nodes      LABS  --------------------------------------------------------------------------------------                        8.4    12.03 )-----------( 226      ( 2023 22:09 )             26.7   01-07    146<H>  |  107  |  75<H>  ----------------------------<  129<H>  4.1   |  29  |  2.51<H>    Ca    8.7      2023 22:09  Phos  2.8       Mg     2.8         Urinalysis Basic - ( 2023 14:53 )    Color: Yellow / Appearance: Slightly Turbid / S.018 / pH: x  Gluc: x / Ketone: Negative  / Bili: Negative / Urobili: 2 mg/dL   Blood: x / Protein: 100 mg/dl / Nitrite: Negative   Leuk Esterase: Negative / RBC: 159 /hpf / WBC 4 /HPF   Sq Epi: x / Non Sq Epi: 1 /hpf / Bacteria: Negative    PT/INR - ( 2023 22:26 )   PT: 13.9 sec;   INR: 1.21 ratio         PTT - ( 2023 22:26 )  PTT:38.9 sec  --------------------------------------------------------------------------------------     SICU Daily Progress Note  =====================================================  Interval/Overnight Events:     - UA and CXR showed no signs of acute infection in setting of elevated WBC  - Home Metoprolol 12.5mg q 12hrs retsarted.  - Free water increased to 250mL q 4hrs  - Agitated and given Haldol 1mg x 1     HPI:  This is a 89 y/o male w/ a PMHx of CAD s/p triple vessel CABG, HTN, HLD, CKD stage IV, BPH, gout, and anemia who presented as a level 1 trauma after being found outside on the ground by son who reports that patient who is normally A&Ox4 has been intermittently lethargic and confused since covid dx (). In the ED, GCS was 10 with worsening mental status requiring intubation for airway protection and noted to be hypothermic to 25° C, hypotension (60s/40s > 110's/70's after 1L) and with a 3 mm left eyebrow lac, left nare laceration, a left lip laceration, left periorbital ecchymosis, and bilateral nasal bone/ nasal septum fractures on imaging. Transferred to SICU for hemodynamic monitoring.    Allergies: No Known Allergies      MEDICATIONS:   --------------------------------------------------------------------------------------  Neurologic Medications  acetaminophen     Tablet .. 650 milliGRAM(s) Oral every 6 hours PRN Mild Pain (1 - 3)  melatonin 3 milliGRAM(s) Oral at bedtime PRN Insomnia    Respiratory Medications  albuterol/ipratropium for Nebulization 3 milliLiter(s) Nebulizer every 6 hours    Cardiovascular Medications  furosemide    Tablet 20 milliGRAM(s) Oral daily  metoprolol tartrate 12.5 milliGRAM(s) Enteral Tube every 12 hours  metoprolol tartrate 12.5 milliGRAM(s) Enteral Tube once    Gastrointestinal Medications  folic acid 1 milliGRAM(s) Oral daily    Genitourinary Medications    Hematologic/Oncologic Medications  aspirin  chewable 81 milliGRAM(s) Oral daily  enoxaparin Injectable 30 milliGRAM(s) SubCutaneous every 24 hours  epoetin sherrie-epbx (RETACRIT) Injectable 19795 Unit(s) SubCutaneous <User Schedule>    Antimicrobial/Immunologic Medications    Endocrine/Metabolic Medications  atorvastatin 20 milliGRAM(s) Oral at bedtime    Topical/Other Medications  artificial  tears Solution 1 Drop(s) Both EYES four times a day  bacitracin   Ointment 1 Application(s) Topical two times a day  chlorhexidine 2% Cloths 1 Application(s) Topical <User Schedule>  sodium chloride 0.65% Nasal 1 Spray(s) Both Nostrils daily    --------------------------------------------------------------------------------------    VITAL SIGNS, INS/OUTS (last 24 hours):  --------------------------------------------------------------------------------------  T(C): 36.8 (23 @ 22:00), Max: 36.9 (23 @ 08:00)  HR: 61 (23 @ 23:00) (58 - 116)  BP: 111/56 (23 @ 23:00) (83/44 - 120/55)  RR: 24 (23 @ 23:00) (17 - 33)  SpO2: 98% (23 @ 23:00) (89% - 100%)    23 @ 07:01  -  23 @ 07:00  --------------------------------------------------------  IN: 1879.5 mL / OUT: 450 mL / NET: 1429.5 mL    23 @ 07:01  -  23 @ 00:02  --------------------------------------------------------  IN: 1770 mL / OUT: 300 mL / NET: 1470 mL      --------------------------------------------------------------------------------------  EXAM  GENERAL:   No acute distress, lethargic but arousable to touch.   HEAD/EYES:    PERRL, conjunctiva and sclera clear. Left periorbital ecchymosis and sutured left eyebrow lac (no oozing or signs of infection)  ENMT:   No oropharyngeal exudates, erythema or lesions, dry mucous membranes  NECK:   Supple, no cervical lymphadenopathy, no JVD.  NERVOUS SYSTEM:   Alert and confused, CN II-XII intact, Moves all extremities.  CHEST/LUNG:   Diminished, rhonchorous breath sounds bilaterally.   HEART:   cardiac monitor NSR HR 83, AV paced ; S1/S2 without murmurs, without rubs, or gallops.   ABDOMEN:   Soft, Nontender, Nondistended; Bowel sounds present, Bladder non distended, non palpable  EXTREMITIES:   Pulses palpable radial pulses 2+ bilat, DP/PT 1+/1+ bilat, without clubbing, cyanosis. Digits warm to touch with good cap refill < 3 secs.  SKIN:   warm, dry, intact, normal color, no rash or abnormal lesions, without palpable nodes      LABS  --------------------------------------------------------------------------------------                        8.4    12.03 )-----------( 226      ( 2023 22:09 )             26.7   01-07    146<H>  |  107  |  75<H>  ----------------------------<  129<H>  4.1   |  29  |  2.51<H>    Ca    8.7      2023 22:09  Phos  2.8     01-07  Mg     2.8     07    Urinalysis Basic - ( 2023 14:53 )    Color: Yellow / Appearance: Slightly Turbid / S.018 / pH: x  Gluc: x / Ketone: Negative  / Bili: Negative / Urobili: 2 mg/dL   Blood: x / Protein: 100 mg/dl / Nitrite: Negative   Leuk Esterase: Negative / RBC: 159 /hpf / WBC 4 /HPF   Sq Epi: x / Non Sq Epi: 1 /hpf / Bacteria: Negative    PT/INR - ( 2023 22:26 )   PT: 13.9 sec;   INR: 1.21 ratio         PTT - ( 2023 22:26 )  PTT:38.9 sec  --------------------------------------------------------------------------------------     SICU Daily Progress Note  =====================================================  Interval/Overnight Events:     - Became profoundly obtunded with ABG showing hypoxemia without hypoxia placed on nonrebreather 10L  - GOC conversation with Son Gordon   - UA and CXR showed no signs of acute infection in setting of elevated WBC  - Home Metoprolol 12.5mg q 12hrs retsarted.  - Free water increased to 250mL q 4hrs      HPI:  This is a 89 y/o male w/ a PMHx of CAD s/p triple vessel CABG, HTN, HLD, CKD stage IV, BPH, gout, and anemia who presented as a level 1 trauma after being found outside on the ground by son who reports that patient who is normally A&Ox4 has been intermittently lethargic and confused since covid dx (). In the ED, GCS was 10 with worsening mental status requiring intubation for airway protection and noted to be hypothermic to 25° C, hypotension (60s/40s > 110's/70's after 1L) and with a 3 mm left eyebrow lac, left nare laceration, a left lip laceration, left periorbital ecchymosis, and bilateral nasal bone/ nasal septum fractures on imaging. Transferred to SICU for hemodynamic monitoring.    Allergies: No Known Allergies      MEDICATIONS:   --------------------------------------------------------------------------------------  Neurologic Medications  acetaminophen     Tablet .. 650 milliGRAM(s) Oral every 6 hours PRN Mild Pain (1 - 3)  melatonin 3 milliGRAM(s) Oral at bedtime PRN Insomnia    Respiratory Medications  albuterol/ipratropium for Nebulization 3 milliLiter(s) Nebulizer every 6 hours    Cardiovascular Medications  furosemide    Tablet 20 milliGRAM(s) Oral daily  metoprolol tartrate 12.5 milliGRAM(s) Enteral Tube every 12 hours  metoprolol tartrate 12.5 milliGRAM(s) Enteral Tube once    Gastrointestinal Medications  folic acid 1 milliGRAM(s) Oral daily    Genitourinary Medications    Hematologic/Oncologic Medications  aspirin  chewable 81 milliGRAM(s) Oral daily  enoxaparin Injectable 30 milliGRAM(s) SubCutaneous every 24 hours  epoetin sherrie-epbx (RETACRIT) Injectable 73625 Unit(s) SubCutaneous <User Schedule>    Antimicrobial/Immunologic Medications    Endocrine/Metabolic Medications  atorvastatin 20 milliGRAM(s) Oral at bedtime    Topical/Other Medications  artificial  tears Solution 1 Drop(s) Both EYES four times a day  bacitracin   Ointment 1 Application(s) Topical two times a day  chlorhexidine 2% Cloths 1 Application(s) Topical <User Schedule>  sodium chloride 0.65% Nasal 1 Spray(s) Both Nostrils daily    --------------------------------------------------------------------------------------    VITAL SIGNS, INS/OUTS (last 24 hours):  --------------------------------------------------------------------------------------  T(C): 36.8 (23 @ 22:00), Max: 36.9 (23 @ 08:00)  HR: 61 (23 @ 23:00) (58 - 116)  BP: 111/56 (23 @ 23:00) (83/44 - 120/55)  RR: 24 (23 @ 23:00) (17 - 33)  SpO2: 98% (23 @ 23:00) (89% - 100%)    23 @ 07:01  -  23 @ 07:00  --------------------------------------------------------  IN: 1879.5 mL / OUT: 450 mL / NET: 1429.5 mL    23 @ 07:01  -  23 @ 00:02  --------------------------------------------------------  IN: 1770 mL / OUT: 300 mL / NET: 1470 mL      --------------------------------------------------------------------------------------  EXAM  GENERAL:   No acute distress, lethargic but arousable to touch.   HEAD/EYES:    PERRL, conjunctiva and sclera clear. Left periorbital ecchymosis and sutured left eyebrow lac (no oozing or signs of infection)  ENMT:   No oropharyngeal exudates, erythema or lesions, dry mucous membranes  NECK:   Supple, no cervical lymphadenopathy, no JVD.  NERVOUS SYSTEM:   Alert and confused, CN II-XII intact, Moves all extremities.  CHEST/LUNG:   Diminished, rhonchorous breath sounds bilaterally.   HEART:   cardiac monitor NSR HR 83, AV paced ; S1/S2 without murmurs, without rubs, or gallops.   ABDOMEN:   Soft, Nontender, Nondistended; Bowel sounds present, Bladder non distended, non palpable  EXTREMITIES:   Pulses palpable radial pulses 2+ bilat, DP/PT 1+/1+ bilat, without clubbing, cyanosis. Digits warm to touch with good cap refill < 3 secs.  SKIN:   warm, dry, intact, normal color, no rash or abnormal lesions, without palpable nodes      LABS  --------------------------------------------------------------------------------------                        8.4    12.03 )-----------( 226      ( 2023 22:09 )             26.7   01-07    146<H>  |  107  |  75<H>  ----------------------------<  129<H>  4.1   |  29  |  2.51<H>    Ca    8.7      2023 22:09  Phos  2.8       Mg     2.8         Urinalysis Basic - ( 2023 14:53 )    Color: Yellow / Appearance: Slightly Turbid / S.018 / pH: x  Gluc: x / Ketone: Negative  / Bili: Negative / Urobili: 2 mg/dL   Blood: x / Protein: 100 mg/dl / Nitrite: Negative   Leuk Esterase: Negative / RBC: 159 /hpf / WBC 4 /HPF   Sq Epi: x / Non Sq Epi: 1 /hpf / Bacteria: Negative    PT/INR - ( 2023 22:26 )   PT: 13.9 sec;   INR: 1.21 ratio         PTT - ( 2023 22:26 )  PTT:38.9 sec  --------------------------------------------------------------------------------------

## 2023-01-08 NOTE — PROGRESS NOTE ADULT - ASSESSMENT
89 year old male history of CAD, s/p CABG, PPM, HTN, CKD 4, HLD, gout, upper GI bleed 2/2022, recent COVID 12/25/22, presenting as a level 1 trauma activation after being found down unresponsive in his backyard by his son. Patient arrived hypothermic to 78F, hypotensive to 60/40, responded to 110's/70's with warm IVF resuscitation, HR 70's, O2 100%, GCS 10 (4,2,4). During initial resuscitation became progressively more obtunded and was intubated for airway protection. eFAST negative CXR and PXR in trauma bay unremarkable. Pt extubated on 1/2.    PLAN:  - comfort care measures  - Appreciate excellent care per Surgical Intensive Care Unit    Surgery [phone]

## 2023-01-08 NOTE — PROVIDER CONTACT NOTE (OTHER) - ACTION/TREATMENT ORDERED:
EKG complete no further action ordered
NG tube replacement to be attempted.
no intervention ordered at this time. Will discuss further during rounds
Providers at bedside. Contacting family to decide whether to escalate care vs. comfort.
no intervention ordered at this time.
same as above
MAE Garcia at bedside assessed patient. Patient was grimacing and withdrawing on all extremities. No action ordered at this time
no intervention at this time. will continue to monitor.

## 2023-01-08 NOTE — GOALS OF CARE CONVERSATION - ADVANCED CARE PLANNING - CONVERSATION DETAILS
Mr. Castillo is a  89yo male with PMHx of congenital deafness (communicates w/ ASL), CAD s/p triple vessel CABG, s/p biventricular PPM, HTN, HLD, CKD stage IV, BPH, gout, and anemia present as Level 1 Trauma after being found down outside for unknown length of time, intubated in Trauma bay d/t AMS. Initially admitted to SICU for ventilatory and pressor support, ultimately extubated. Hospital course complicated by worsening mental status, agitation.     Notified by RN patient with continued decline of mental status. Patient now obtunded, minimally responsive to painful stimuli. ABG without evidence of hypercapnia. Living will reviewed with patient's son Gordon Castillo - patient's wishes state to withhold and withdraw treatment that serves only to prolong his dying. Family is in agreement to withdraw treatment and focus on comfort measures only including Morphine gtt. All orders not aligned with comfort care discontinued.    Patient is DNR/DNI. Comfort measures maintained.   Family en route to hospital. All questions answered, emotional support provided.  Discussed with attending.

## 2023-01-08 NOTE — PROVIDER CONTACT NOTE (OTHER) - DATE AND TIME:
01-Jan-2023 08:50
05-Jan-2023 16:15
05-Jan-2023 09:09
06-Jan-2023 18:24
07-Jan-2023 13:30
08-Jan-2023 01:00
02-Jan-2023 15:40
05-Jan-2023 07:30
DISPLAY PLAN FREE TEXT

## 2023-01-08 NOTE — PROVIDER CONTACT NOTE (OTHER) - REASON
self removed NGT
5 beats vtach
patient has 15 beats of vtach
patient EKG showing he was bradycardic to 33bpm
13 beats of V-TACH
Change in mental status
pt fighting, agitated, attempting to pull out NGT
patient mental status wax and wanes when he appears asleep only response is to rigorous stimulation and pain

## 2023-01-08 NOTE — PROVIDER CONTACT NOTE (OTHER) - BACKGROUND
found down outside s/p fall with multiple facial fractures and lacerations. Intubated in ED for airway protection and requiring vasopressor support
Deaf, S/P fall
Patient admitted for fall 12/25
Patient admitted s.p level one trauma
patient admitted s/p fall, hypothermic, deaf
pt s/p fall 12/25
found down outside s/p fall with multiple facial fractures and lacerations. Intubated in ED for airway protection and requiring vasopressor support
pt s/p fall 12/25

## 2023-01-08 NOTE — PROVIDER CONTACT NOTE (OTHER) - RECOMMENDATIONS
come assess patient
ecg
EKG
Haldol ordered
Please see patient at bedside and call family to discuss intubation due to concern for not protecting his airway
Replace NG tube.
Cont to monitor.
cardiac enzymes. decrease

## 2023-01-08 NOTE — PROVIDER CONTACT NOTE (OTHER) - ASSESSMENT
Patient started to desaturate and when checked on he was obtunded. Patient only opening eyes to pain and withdrawing to pain. Patient breathing is labored and he is not protecting his airway
Pt offered sign language service to evaluate pt current needs
no eye opening, withdraws & grimaces from pain
patient EKG showing he was bradycardic to 33bpm. /60
patient mental status wax and wanes when he appears asleep only response is to rigorous stimulation and pain. Pt wont open eyes but withdraws on all extremities. However moments later patient will be alert and orientated then once he falls asleep he only responds to pain
Pt extubated, ANDRE orientation as pt is deaf. On 40% venturi mask with VS as charted. Pt shows no S/S of pain or distress. IV access and baez intact. Pt w/ PPM.
Vital signs stable, NG tube self removed.
Pt on .03mcg/kg/min levo and 2.5mg/kg/min . Pt intubated and called. L  pacemaker ventricular pacing patient at 60 BPM

## 2023-01-08 NOTE — GOALS OF CARE CONVERSATION - ADVANCED CARE PLANNING - CAREGIVER NAME
Continue to encourage exercise and dietary modification to obtain normal weight.   Regular exercise [facility] 3 days a week   Wt Readings from Last 9 Encounters:   05/17/22 90.6 kg (199 lb 11.8 oz)   03/04/22 91.9 kg (202 lb 9.6 oz)   02/17/22 92.3 kg (203 lb 7.8 oz)   02/03/22 92.5 kg (203 lb 14.8 oz)   01/24/22 94.1 kg (207 lb 7.3 oz)   01/11/22 93.4 kg (205 lb 14.6 oz)   12/28/21 93.3 kg (205 lb 11 oz)   12/02/21 93.4 kg (206 lb)   11/15/21 93.8 kg (206 lb 12.7 oz)   Body mass index is 27.86 kg/m².    
Gordon Castillo
Luís Castillo

## 2023-01-08 NOTE — PROVIDER CONTACT NOTE (OTHER) - SITUATION
pt had 5 beats of VTach
patient has 15 beats of vtach @ 0906
13 beats of V-TACH at 1535.
Patient obtunded and has a change in mental status
Patient self removed NG tube.
Pt agitated.  Attempting to pull out NGT, kicking and punching.
patient EKG showing he was bradycardic to 33bpm
patient mental status wax and wanes when he appears asleep only response is to rigorous stimulation and pain

## 2023-01-08 NOTE — PROGRESS NOTE ADULT - SUBJECTIVE AND OBJECTIVE BOX
Patient is a 89y old  Male who presents with a chief complaint of s/p fall (01 Jan 2023 00:41)    Patient seen and family at bedside.     Medication:   acetaminophen   IVPB .. 750 milliGRAM(s) IV Intermittent every 6 hours PRN  chlorhexidine 2% Cloths 1 Application(s) Topical <User Schedule>  glycopyrrolate Injectable 0.4 milliGRAM(s) IV Push every 4 hours PRN  morphine  Infusion 1 mG/Hr IV Continuous <Continuous>      Physical exam    T(C): 36.1 (01-08-23 @ 07:00), Max: 36.8 (01-07-23 @ 22:00)  HR: 64 (01-08-23 @ 12:00) (58 - 80)  BP: 106/52 (01-08-23 @ 12:00) (91/55 - 126/58)  RR: 17 (01-08-23 @ 12:00) (17 - 25)  SpO2: 100% (01-08-23 @ 12:00) (89% - 100%)  Wt(kg): --    Mild labored breathing   Ecchymoses on face from prior fall  Cv s1 S2 RRR  Lungs scattered rhonchi  abd soft ND +BS  No LE edema    Labs                      8.4    12.03 )-----------( 226      ( 07 Jan 2023 22:09 )             26.7       01-07    146<H>  |  107  |  75<H>  ----------------------------<  129<H>  4.1   |  29  |  2.51<H>    Ca    8.7      07 Jan 2023 22:09  Phos  2.8     01-07  Mg     2.8     01-07            [id number]

## 2023-01-08 NOTE — PROGRESS NOTE ADULT - ASSESSMENT
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   -Maintain MAP>65    GI: failed S/S 1/4 and repeat on 1/6  - Dobhoff placement attempted multiple times 1/5, without success, placed 1/6  - NPO w TF via Dobhoff   - Protonix for stress ulcer prophylaxis    Renal: LUCY on CKD stage IV (baseline Cr 2-3)  - Replete lytes PRN  - D5 @ 30cc  - Voiding    Heme:   - Monitor CBC and coags   - Started on Retacrit by Heme for suspected MDS and possible MM  - VTE ppx: Lovenox   - SCDs    ID: COVID-19 infection resolved, Blood Cx 12/29 w G + Cocci, repeat Blood Cx NGTD   - Meropenem 12/29-1/4  - repeat blood cultures: NGTD     Endo: hyperglycemia  - Monitor glucose w/ ISS q6hrs for glycemic control  - HgbA1C 5.6%    MSK: bilateral nasal bone & nasal septum fracture, facial lacerations  - Outpatient follow-up w/ plastics  - Sinus precautions  - Facial lacerations repaired w/ Nylon sutures on 12/29  - Sutures to be removed Sunday 1/8    GOC:  - DNR, ongoing conversation with family regarding DNI status     Dispo:   -SICU   89 y/o male w/ a PMHx of congenital deafness (communicates w/ ASL), CAD s/p triple vessel CABG, s/p biventricular PPM, HTN, HLD, CKD stage IV, BPH, gout, and anemia present as Level 1 Trauma after being found down outside for unknown length of time, intubated in Trauma bay d/t AMS. Admitted to SICU for ventilatory support. Initially requiring vasopressor and inotropic support, now weaned. Extubated 1/2.     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   -Maintain MAP>65    GI: failed S/S 1/4 and repeat on 1/6  - Dobhoff placement attempted multiple times 1/5, without success, placed 1/6  - NPO w TF via Dobhoff   - Protonix for stress ulcer prophylaxis    Renal: LUCY on CKD stage IV (baseline Cr 2-3)  - Replete lytes PRN  - D5 @ 30cc  - Voiding    Heme:   - Monitor CBC and coags   - Started on Retacrit by Heme for suspected MDS and possible MM  - VTE ppx: Lovenox   - SCDs    ID: COVID-19 infection resolved, Blood Cx 12/29 w G + Cocci, repeat Blood Cx NGTD   - Meropenem 12/29-1/4  - repeat blood cultures: NGTD     Endo: hyperglycemia  - Monitor glucose w/ ISS q6hrs for glycemic control  - HgbA1C 5.6%    MSK: bilateral nasal bone & nasal septum fracture, facial lacerations  - Outpatient follow-up w/ plastics  - Sinus precautions  - Facial lacerations repaired w/ Nylon sutures on 12/29  - Sutures to be removed Sunday 1/8    GOC:  - DNR, ongoing conversation with family regarding DNI status     Dispo:   -SICU   89 y/o male w/ a PMHx of congenital deafness (communicates w/ ASL), CAD s/p triple vessel CABG, s/p biventricular PPM, HTN, HLD, CKD stage IV, BPH, gout, and anemia present as Level 1 Trauma after being found down outside for unknown length of time, intubated in Trauma bay d/t AMS. Admitted to SICU for ventilatory support. Initially requiring vasopressor and inotropic support, now weaned. Extubated 1/2.     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   -Maintain MAP>65    GI: failed S/S 1/4 and repeat on 1/6  - Dobhoff placement attempted multiple times 1/5, without success, placed 1/6  - NPO w TF via Dobhoff   - Protonix for stress ulcer prophylaxis    Renal: LUCY on CKD stage IV (baseline Cr 2-3)  - D5 @ 30cc  - Voiding    Heme:   - Started on Retacrit by Heme for suspected MDS and possible MM  - VTE ppx: Lovenox   - SCDs    ID: COVID-19 infection resolved, Blood Cx 12/29 w G + Cocci, repeat Blood Cx NGTD   - Meropenem 12/29-1/4  - repeat blood cultures: NGTD     Endo: hyperglycemia  - d/c fingersticks  - HgbA1C 5.6%    MSK: bilateral nasal bone & nasal septum fracture, facial lacerations  - Outpatient follow-up w/ plastics  - Sinus precautions  - Facial lacerations repaired w/ Nylon sutures on 12/29  - Sutures to be removed Sunday 1/8    GOC:  - DNR/DNI  - Comfort Care (1/8)    Dispo:   -SICU

## 2023-01-08 NOTE — PROGRESS NOTE ADULT - SUBJECTIVE AND OBJECTIVE BOX
Surgery Progress Note     Subjective:  Patient seen on AM rounds. Comfort care measures, family is at bedside.      Vital Signs:  Vital Signs Last 24 Hrs  T(C): 36.1 (08 Jan 2023 07:00), Max: 36.8 (07 Jan 2023 22:00)  T(F): 96.9 (08 Jan 2023 07:00), Max: 98.3 (07 Jan 2023 22:00)  HR: 60 (08 Jan 2023 10:00) (58 - 80)  BP: 121/60 (08 Jan 2023 10:00) (85/49 - 126/58)  BP(mean): 86 (08 Jan 2023 10:00) (65 - 87)  RR: 17 (08 Jan 2023 10:00) (17 - 33)  SpO2: 100% (08 Jan 2023 10:00) (89% - 100%)    Parameters below as of 08 Jan 2023 07:00  Patient On (Oxygen Delivery Method): mask, Venturi    O2 Concentration (%): 40    CAPILLARY BLOOD GLUCOSE          I&O's Detail    07 Jan 2023 07:01  -  08 Jan 2023 07:00  --------------------------------------------------------  IN:    Enteral Tube Flush: 100 mL    Free Water: 1000 mL    Jevity 1.5: 745 mL    Morphine: 4 mL  Total IN: 1849 mL    OUT:    Indwelling Catheter - Urethral (mL): 230 mL    Intermittent Catheterization - Urethral (mL): 300 mL  Total OUT: 530 mL    Total NET: 1319 mL      08 Jan 2023 07:01  -  08 Jan 2023 11:37  --------------------------------------------------------  IN:    dextrose 5%: 60 mL  Total IN: 60 mL    OUT:    Indwelling Catheter - Urethral (mL): 90 mL    Jevity 1.5: 0 mL  Total OUT: 90 mL    Total NET: -30 mL            Physical Exam:  Constitutional: NAD, resting in bed  HEENT: face with multiple abrasions, ecchymosis from nasal fracture, closed laceration above left eyebrow  Abdomen: Soft, nondistended  Extremities: LUE bruised from fingers to bicep area, RUE w/ bruising from fingers to forearm      Labs:    01-07    146<H>  |  107  |  75<H>  ----------------------------<  129<H>  4.1   |  29  |  2.51<H>    Ca    8.7      07 Jan 2023 22:09  Phos  2.8     01-07  Mg     2.8     01-07                              8.4    12.03 )-----------( 226      ( 07 Jan 2023 22:09 )             26.7     PT/INR - ( 06 Jan 2023 22:26 )   PT: 13.9 sec;   INR: 1.21 ratio         PTT - ( 06 Jan 2023 22:26 )  PTT:38.9 sec

## 2023-01-09 NOTE — PROGRESS NOTE ADULT - SUBJECTIVE AND OBJECTIVE BOX
24 HOUR EVENTS:  - After multiple discussions, patient's family agreeable to comfort care  - Morphine gtt, Robinul    SUBJECTIVE/ROS:  [ ] A ten-point review of systems was otherwise negative except as noted.  [ X ] Due to altered mental status/intubation, subjective information were not able to be obtained from the patient. History was obtained, to the extent possible, from review of the chart and collateral sources of information.      NEURO  Exam: Obtunded  Meds: acetaminophen   IVPB .. 750 milliGRAM(s) IV Intermittent every 6 hours PRN Mild Pain (1 - 3)  morphine  Infusion 2 mG/Hr IV Continuous <Continuous>    [x] Adequacy of sedation and pain control has been assessed and adjusted      RESPIRATORY  RR: 23 (01-08-23 @ 22:00) (16 - 25)  SpO2: 92% (01-08-23 @ 22:00) (84% - 100%)  Wt(kg): --  Exam: CTA b/l. No murmurs, rubs, gallops appreciated.   Mechanical Ventilation:   ABG - ( 08 Jan 2023 01:14 )  pH: 7.51  /  pCO2: 40    /  pO2: 57    / HCO3: 32    / Base Excess: 8.2   /  SaO2: 90.2    Lactate: x      [ ] Extubation Readiness Assessed  Meds:       CARDIOVASCULAR  HR: 87 (01-08-23 @ 22:00) (60 - 104)  BP: 134/63 (01-08-23 @ 21:00) (103/53 - 134/63)  BP(mean): 90 (01-08-23 @ 21:00) (73 - 90)  ABP: --  ABP(mean): --  Wt(kg): --  CVP(cm H2O): --      Exam: S1S2. No murmurs, rubs, gallops appreciated.  Cardiac Rhythm: Paced rhythm  Meds:       GI/NUTRITION  Exam: Soft, non-distended, non-tender.   Diet: NPO  Meds: glycopyrrolate Injectable 0.4 milliGRAM(s) IV Push every 4 hours PRN Secretions      GENITOURINARY  I&O's Detail    01-07 @ 07:01  -  01-08 @ 07:00  --------------------------------------------------------  IN:    Enteral Tube Flush: 100 mL    Free Water: 1000 mL    Jevity 1.5: 745 mL    Morphine: 4 mL  Total IN: 1849 mL    OUT:    Indwelling Catheter - Urethral (mL): 230 mL    Intermittent Catheterization - Urethral (mL): 300 mL  Total OUT: 530 mL    Total NET: 1319 mL      01-08 @ 07:01  -  01-09 @ 00:40  --------------------------------------------------------  IN:    dextrose 5%: 60 mL    Morphine: 9 mL    Morphine: 2 mL  Total IN: 71 mL    OUT:    Indwelling Catheter - Urethral (mL): 770 mL    Jevity 1.5: 0 mL  Total OUT: 770 mL    Total NET: -699 mL          01-07    146<H>  |  107  |  75<H>  ----------------------------<  129<H>  4.1   |  29  |  2.51<H>    Ca    8.7      07 Jan 2023 22:09  Phos  2.8     01-07  Mg     2.8     01-07      [ ] Davis catheter, indication: N/A  Meds:       HEMATOLOGIC  Meds:   [x] VTE Prophylaxis                        8.4    12.03 )-----------( 226      ( 07 Jan 2023 22:09 )             26.7       Transfusion     [ ] PRBC   [ ] Platelets   [ ] FFP   [ ] Cryoprecipitate      INFECTIOUS DISEASES  T(C): 36.5 (01-08-23 @ 21:00), Max: 36.5 (01-08-23 @ 21:00)  Wt(kg): --    Recent Cultures:    Meds:       ENDOCRINE  Capillary Blood Glucose    Meds:       ACCESS DEVICES:  [ X ] Peripheral IV  [ ] Central Venous Line	[ ] R	[ ] L	[ ] IJ	[ ] Fem	[ ] SC	Placed:   [ ] Arterial Line		[ ] R	[ ] L	[ ] Fem	[ ] Rad	[ ] Ax	Placed:   [ ] PICC:					[ ] Mediport  [ X ] Urinary Catheter, Date Placed: 1/8  [ ] Necessity of urinary, arterial, and venous catheters discussed    OTHER MEDICATIONS:  chlorhexidine 2% Cloths 1 Application(s) Topical <User Schedule>      CODE STATUS: Yes    Yes        IMAGING:

## 2023-01-09 NOTE — DISCHARGE NOTE FOR THE EXPIRED PATIENT - HOSPITAL COURSE
Patient is a 89 y/o male w/ a PMHx of congenital deafness (communicates w/ ASL), CAD s/p triple vessel CABG, s/p biventricular PPM, HTN, HLD, CKD stage IV, BPH, gout, and anemia who presented as a level 1 trauma after being found down outside. As per the son, patient went to bed around 2130 yesterday and was found down outside around 0600 w/ his walker. There are two steps that lead outdoor. Of note, son states that he is normally A&Ox4 but had been intermittently lethargic & confused after he was found to be COVID-19 positive on 12/25. In the ED, GCS was 10 but mental status was progressively worsening so he was intubated for airway protection. He was also hypothermic to 25° C and hypotensive w/ BP in the 60s/40s that improved to the 110s/70s after a liter bolus of warm crystalloid but he became hypotensive again so vasopressor support was initiated and a right femoral arterial line was placed.       The patient was placed on vasopressor support/dobutamine and broad spectrum antibiotics for concern of cardiogenic versus septic shock. He was weaned off of sedation and remained intubated on pressor support. He was found to be acidotic and placed on a bicarb gtt. EEG was performed for poor mental status which showed no epileptiform activity. Daily CXR showed unresolving bilateral pleural effusions and pulmonary edema refractory to diuretics.  NG tube was placed for concern of aspiration and continuous failed speech and swallow studies and tube feeds were started for nutritional support. Patient was extubated on 1/3 and pressor support was successfully weaned. Patient was made DNR by family on 1/5/23. Patient became obtunded on 9/7-9/8 overnight with hypoxemia without hypoxia.GOC conversation with family on 1/9, UA and CXR showed no signs of infection. Patient's family  opted for comfort care only. Patient was found to be without a pulse or respiratory status on 1/9/23 at 7:15 am. Family was made aware.    Patient is a 89 y/o male w/ a PMHx of congenital deafness (communicates w/ ASL), CAD s/p triple vessel CABG, s/p biventricular PPM, HTN, HLD, CKD stage IV, BPH, gout, and anemia who presented as a level 1 trauma after being found down outside. As per the son, patient went to bed around 2130 yesterday and was found down outside around 0600 w/ his walker. There are two steps that lead outdoor. Of note, son states that he is normally A&Ox4 but had been intermittently lethargic & confused after he was found to be COVID-19 positive on 12/25. In the ED, GCS was 10 but mental status was progressively worsening so he was intubated for airway protection. He was also hypothermic to 25° C and hypotensive w/ BP in the 60s/40s that improved to the 110s/70s after a liter bolus of warm crystalloid but he became hypotensive again so vasopressor support was initiated and a right femoral arterial line was placed.       The patient was placed on vasopressor support/dobutamine and broad spectrum antibiotics for concern of cardiogenic versus septic shock. He was weaned off of sedation and remained intubated on pressor support. He was found to be acidotic and placed on a bicarb gtt. EEG was performed for poor mental status which showed no epileptiform activity. Daily CXR showed unresolving bilateral pleural effusions and pulmonary edema refractory to diuretics.  NG tube was placed for concern of aspiration and continuous failed speech and swallow studies and tube feeds were started for nutritional support. Patient was extubated on 1/3 and pressor support was successfully weaned. Patient was made DNR by family on 1/5/23. Patient became obtunded on 9/7-9/8 overnight with hypoxemia without hypoxia.GOC conversation with family on 1/9, UA and CXR showed no signs of infection. Patient's family  opted for comfort care only. Patient was found to be without a pulse or respiratory status on 1/9/23 at 7:15 am. Family was made aware.

## 2023-01-09 NOTE — PROGRESS NOTE ADULT - NUTRITIONAL ASSESSMENT
This patient has been assessed with a concern for Malnutrition and has been determined to have a diagnosis/diagnoses of Severe protein-calorie malnutrition.    This patient is being managed with:   Diet NPO-  Entered: Jan 2 2023  2:57PM    
This patient has been assessed with a concern for Malnutrition and has been determined to have a diagnosis/diagnoses of Severe protein-calorie malnutrition.    This patient is being managed with:   Diet NPO with Tube Feed-  Tube Feeding Modality: Nasogastric  Jevity 1.5 Ezio (JEVITY1.5RTH)  Total Volume for 24 Hours (mL): 1080  Continuous  Starting Tube Feed Rate {mL per Hour}: 20  Increase Tube Feed Rate by (mL): 10     Every 6 hours  Until Goal Tube Feed Rate (mL per Hour): 45  Tube Feed Duration (in Hours): 24  Tube Feed Start Time: 08:00  Free Water Flush  Bolus   Total Volume per Flush (mL): 250   Frequency: Every 4 Hours  Entered: Jan 7 2023  8:42AM    
This patient has been assessed with a concern for Malnutrition and has been determined to have a diagnosis/diagnoses of Severe protein-calorie malnutrition.    This patient is being managed with:   Diet NPO with Tube Feed-  Tube Feeding Modality: Nasogastric  Jevity 1.5 Ezio (JEVITY1.5RTH)  Total Volume for 24 Hours (mL): 1080  Continuous  Starting Tube Feed Rate {mL per Hour}: 20  Increase Tube Feed Rate by (mL): 10     Every 6 hours  Until Goal Tube Feed Rate (mL per Hour): 45  Tube Feed Duration (in Hours): 24  Tube Feed Start Time: 08:00  Free Water Flush  Bolus   Total Volume per Flush (mL): 250   Frequency: Every 4 Hours  Entered: Jan 7 2023  8:42AM    
This patient has been assessed with a concern for Malnutrition and has been determined to have a diagnosis/diagnoses of Severe protein-calorie malnutrition.    This patient is being managed with:   Diet NPO with Tube Feed-  Tube Feeding Modality: Nasogastric  Jevity 1.5 Ezio (JEVITY1.5RTH)  Total Volume for 24 Hours (mL): 1080  Continuous  Starting Tube Feed Rate {mL per Hour}: 20  Increase Tube Feed Rate by (mL): 10     Every 6 hours  Until Goal Tube Feed Rate (mL per Hour): 45  Tube Feed Duration (in Hours): 24  Tube Feed Start Time: 08:00  Free Water Flush  Bolus   Total Volume per Flush (mL): 250   Frequency: Every 6 Hours  Entered: Jan 6 2023  8:28AM    
This patient has been assessed with a concern for Malnutrition and has been determined to have a diagnosis/diagnoses of Severe protein-calorie malnutrition.    This patient is being managed with:   Diet NPO with Tube Feed-  Tube Feeding Modality: Nasogastric  Jevity 1.5 Ezio (JEVITY1.5RTH)  Total Volume for 24 Hours (mL): 1080  Continuous  Starting Tube Feed Rate {mL per Hour}: 20  Increase Tube Feed Rate by (mL): 10     Every 6 hours  Until Goal Tube Feed Rate (mL per Hour): 45  Tube Feed Duration (in Hours): 24  Tube Feed Start Time: 08:00  Free Water Flush  Bolus   Total Volume per Flush (mL): 250   Frequency: Every 6 Hours  Entered: Jan 6 2023  8:28AM    
This patient has been assessed with a concern for Malnutrition and has been determined to have a diagnosis/diagnoses of Severe protein-calorie malnutrition.    This patient is being managed with:   Diet NPO-  Entered: Jan 8 2023  3:26AM    
This patient has been assessed with a concern for Malnutrition and has been determined to have a diagnosis/diagnoses of Severe protein-calorie malnutrition.    This patient is being managed with:   Diet NPO-  Entered: Jan 8 2023  3:26AM

## 2023-01-09 NOTE — PROGRESS NOTE ADULT - PROVIDER SPECIALTY LIST ADULT
Heme/Onc
SICU
SICU
Heme/Onc
SICU
Trauma Surgery
SICU

## 2023-01-09 NOTE — PROGRESS NOTE ADULT - ASSESSMENT
89 y/o male w/ a PMHx of congenital deafness (communicates w/ ASL), CAD s/p triple vessel CABG, s/p biventricular PPM, HTN, HLD, CKD stage IV, BPH, gout, and anemia present as Level 1 Trauma after being found down outside for unknown length of time, intubated in Trauma bay d/t AMS. Admitted to SICU for ventilatory support. Initially requiring vasopressor and inotropic support, now weaned. Extubated 1/2. Comfort care initiated 1/9.     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     GI: failed S/S 1/4 and repeat on 1/6  - NPO    Renal: LUCY on CKD stage IV (baseline Cr 2-3)  - Davis placed on 1/9 for retention, comfort care    Heme:   - Retacrit and Lovenox stopped for comfort measures only    ID: COVID-19 infection resolved, Blood Cx 12/29 w G + Cocci, repeat Blood Cx NGTD   - Abx stopped for comfort care     Endo: hyperglycemia  - d/c fingersticks    GOC:  - DNR/DNI  - Comfort Care (1/8)    Dispo:   -SICU

## 2024-01-30 NOTE — PATIENT PROFILE ADULT - NSPROPOAURINARYCATHETER_GEN_A_NUR
Normal serum calcium.  Normal PTH.  Normal vitamin D.  Continue vitamin D3 1000 units 3 capsules daily.  LDL 73.  HDL 75.  Triglycerides 94.  Continue Lipitor 40 mg/day.  Copy of labs sent to [facility] and to patient through [facility].
no

## 2024-06-20 NOTE — ED ADULT TRIAGE NOTE - NS ED NURSE AMBULANCES
[de-identified] : Pleasant easy to examine no distress neck has good motion mild spasm right shoulder guarded motion in all planes tested with some apprehension clinically located healed incisions biceps appears intact elbow motion is full  Left shoulder mild moderate limits in motion mild impingement  MRI left shoulder 5/22/2023:This MRI shows severe glenohumeral osteoarthritis with high-grade chondral loss along the posterior aspect of the glenoid, bony glenoid erosion contributing to increased glenoid retroversion. Chondral loss along the superior and posterior aspect of the humeral head with marginal osteophytes. Diffuse degenerative torn glenoid labrum. There is a small joint effusion with debris's and joint bodies.  Moderate supraspinatus, infraspinatus and upper subscapularis tendinosis. There is a small low-grade tear of the posterior footprint of the supraspinatus tendon, extending into the anterior infraspinatus tendon. There is no high-grade or full-thickness tear.  Lumbar spine tight dorsolumbar fascia and hamstrings negative straight leg raise bilaterally normal gait X-rays lumbar spine sacral spine today mild discogenic disease MRI lumbar spine 4/3/2024 moderate stenosis 4 5 mild stenosis 3 4 ***MRI right shoulder 11/29/2023: Focal full-thickness cuff tear probable intrasubstance tear subscap displaced long head of the biceps labral tear capsulitis and AC joint arthritis Operative report right shoulder 1/26/2024 cuff repair, biceps tenodesis, synovectomy, intra-articular debridement of SLAP tear lysis of adhesions and subacromial decompression [facility]

## 2024-09-30 NOTE — ED ADULT NURSE NOTE - CADM POA PRESS ULCER
Is This A New Presentation, Or A Follow-Up?: Follow Up Venous Evaluation Additional History: Patient presents for an ultrasound of the bilateral lower extremity venous system. No

## 2025-03-24 NOTE — ED PROCEDURE NOTE - NS ED PROCEDURE NOTE1 TUBE APPROPRIATE POSITION
Patient had Lipid, CMP and A1C completed on 3/14/2025 which is prior to 3/19/25 [facility].    Per LOV notes, \"3. Diabetes Mellitus.  A1c level has increased to 7.2 from a previous 7.0. Glucose level is 147, up from 125. He has diabetes testing supplies at home. He is advised to monitor his diet closely and increase physical activity. A referral for diabetes education will be made to help him manage his condition better. A follow-up A1c test will be scheduled in 3 months and again in 6 months.    Return in about 1 year (around 3/19/2026) for Medicare Wellness Visit, fasting labs soon and non fast lab in 3 months.\"    There are future order for A1C in 3 months and future fasting orders for next year (2026).    Please review and advise if fasting lab appointment on 4/1/25 is needed?   Tube in appropriate position per imaging.